# Patient Record
Sex: MALE | Race: WHITE | Employment: OTHER | URBAN - METROPOLITAN AREA
[De-identification: names, ages, dates, MRNs, and addresses within clinical notes are randomized per-mention and may not be internally consistent; named-entity substitution may affect disease eponyms.]

---

## 2017-04-27 RX ORDER — DICYCLOMINE HYDROCHLORIDE 10 MG/1
CAPSULE ORAL
Qty: 120 CAPSULE | Refills: 0 | Status: SHIPPED | OUTPATIENT
Start: 2017-04-27 | End: 2017-05-26 | Stop reason: SDUPTHER

## 2017-05-26 RX ORDER — DICYCLOMINE HYDROCHLORIDE 10 MG/1
CAPSULE ORAL
Qty: 120 CAPSULE | Refills: 0 | Status: SHIPPED | OUTPATIENT
Start: 2017-05-26 | End: 2017-06-23 | Stop reason: SDUPTHER

## 2017-06-23 RX ORDER — DICYCLOMINE HYDROCHLORIDE 10 MG/1
CAPSULE ORAL
Qty: 120 CAPSULE | Refills: 0 | Status: SHIPPED | OUTPATIENT
Start: 2017-06-23 | End: 2017-07-24 | Stop reason: SDUPTHER

## 2017-06-26 ENCOUNTER — TELEPHONE (OUTPATIENT)
Dept: FAMILY MEDICINE CLINIC | Age: 47
End: 2017-06-26

## 2017-07-26 RX ORDER — DICYCLOMINE HYDROCHLORIDE 10 MG/1
CAPSULE ORAL
Qty: 120 CAPSULE | Refills: 0 | Status: SHIPPED | OUTPATIENT
Start: 2017-07-26 | End: 2017-08-28 | Stop reason: SDUPTHER

## 2017-08-11 RX ORDER — VERAPAMIL HYDROCHLORIDE 100 MG/1
CAPSULE, EXTENDED RELEASE ORAL
Qty: 90 CAPSULE | Refills: 0 | Status: SHIPPED | OUTPATIENT
Start: 2017-08-11 | End: 2017-10-20 | Stop reason: SDUPTHER

## 2017-08-15 RX ORDER — ALBUTEROL SULFATE 90 MCG
HFA AEROSOL WITH ADAPTER (GRAM) INHALATION
Qty: 6.7 INHALER | Refills: 3 | Status: SHIPPED | OUTPATIENT
Start: 2017-08-15 | End: 2017-10-20 | Stop reason: SDUPTHER

## 2017-08-24 RX ORDER — ATORVASTATIN CALCIUM 20 MG/1
TABLET, FILM COATED ORAL
Qty: 90 TABLET | Refills: 0 | Status: SHIPPED | OUTPATIENT
Start: 2017-08-24 | End: 2017-10-20 | Stop reason: SDUPTHER

## 2017-08-29 RX ORDER — DICYCLOMINE HYDROCHLORIDE 10 MG/1
CAPSULE ORAL
Qty: 120 CAPSULE | Refills: 0 | Status: SHIPPED | OUTPATIENT
Start: 2017-08-29 | End: 2018-08-11

## 2017-09-05 RX ORDER — DICYCLOMINE HYDROCHLORIDE 10 MG/1
CAPSULE ORAL
Qty: 120 CAPSULE | Refills: 0 | Status: SHIPPED | OUTPATIENT
Start: 2017-09-05 | End: 2017-10-20 | Stop reason: SDUPTHER

## 2017-10-20 ENCOUNTER — OFFICE VISIT (OUTPATIENT)
Dept: FAMILY MEDICINE CLINIC | Age: 47
End: 2017-10-20

## 2017-10-20 VITALS
HEART RATE: 99 BPM | WEIGHT: 315 LBS | DIASTOLIC BLOOD PRESSURE: 82 MMHG | SYSTOLIC BLOOD PRESSURE: 138 MMHG | BODY MASS INDEX: 47.39 KG/M2 | OXYGEN SATURATION: 92 %

## 2017-10-20 DIAGNOSIS — R73.01 IFG (IMPAIRED FASTING GLUCOSE): Primary | ICD-10-CM

## 2017-10-20 DIAGNOSIS — F33.41 RECURRENT MAJOR DEPRESSIVE DISORDER, IN PARTIAL REMISSION (HCC): ICD-10-CM

## 2017-10-20 DIAGNOSIS — F31.11 BIPOLAR 1 DISORDER, MANIC, MILD (HCC): ICD-10-CM

## 2017-10-20 DIAGNOSIS — I10 ESSENTIAL HYPERTENSION: ICD-10-CM

## 2017-10-20 LAB — HBA1C MFR BLD: 6 %

## 2017-10-20 PROCEDURE — 83036 HEMOGLOBIN GLYCOSYLATED A1C: CPT | Performed by: INTERNAL MEDICINE

## 2017-10-20 PROCEDURE — 99214 OFFICE O/P EST MOD 30 MIN: CPT | Performed by: INTERNAL MEDICINE

## 2017-10-20 RX ORDER — ATORVASTATIN CALCIUM 20 MG/1
TABLET, FILM COATED ORAL
Qty: 90 TABLET | Refills: 1 | Status: SHIPPED | OUTPATIENT
Start: 2017-10-20 | End: 2017-11-21 | Stop reason: SDUPTHER

## 2017-10-20 RX ORDER — FLUTICASONE PROPIONATE 50 MCG
2 SPRAY, SUSPENSION (ML) NASAL DAILY
Qty: 1 BOTTLE | Refills: 3 | Status: SHIPPED | OUTPATIENT
Start: 2017-10-20 | End: 2018-01-17

## 2017-10-20 RX ORDER — VERAPAMIL HYDROCHLORIDE 100 MG/1
CAPSULE, EXTENDED RELEASE ORAL
Qty: 90 CAPSULE | Refills: 1 | Status: SHIPPED | OUTPATIENT
Start: 2017-10-20 | End: 2018-08-11

## 2017-10-20 NOTE — PROGRESS NOTES
reviewed. Assessment:      Bernie Hamilton was seen today for hypertension. Diagnoses and all orders for this visit:    Bipolar 1 disorder ([facility] [address]  -     External Referral To Psychology  Failed multiple first-line medications due to concern over taking the medication, noncompliance and potential side effects. Refer encourage the patient to follow through. IFG (impaired fasting glucose)  -     POCT glycosylated hemoglobin (Hb A1C)  Last 2 glucose 160 and 180, A1c today was 6 discussed impaired fasting glucose and reduction of carbohydrate intake. Essential hypertension  Controlled continue current    Other orders  -     verapamil (VERELAN PM) 100 MG CP24 extended release capsule; TAKE 1 CAPSULE BY MOUTH DAILY. -     atorvastatin (LIPITOR) 20 MG tablet; TAKE 1 TABLET BY MOUTH DAILY  Sinus congestion likely seasonal allergies also causing headache, trial of Flonase  -     fluticasone (FLONASE) 50 MCG/ACT nasal spray; 2 sprays by Nasal route daily              Plan:      As above and per orders.

## 2017-11-10 NOTE — TELEPHONE ENCOUNTER
Last office visit 10/20/2017     Last written 8/29/17     Next office visit scheduled 1/22/2018    Requested Prescriptions     Pending Prescriptions Disp Refills    dicyclomine (BENTYL) 10 MG capsule [Pharmacy Med Name: DICYCLOMINE 10 MG CAPSULE] 120 capsule 0     Sig: TAKE 1 CAPSULE BY MOUTH 4 TIMES DAILY (BEFORE MEALS AND NIGHTLY) FOR 30 DAYS.

## 2017-11-13 RX ORDER — DICYCLOMINE HYDROCHLORIDE 10 MG/1
CAPSULE ORAL
Qty: 120 CAPSULE | Refills: 0 | Status: SHIPPED | OUTPATIENT
Start: 2017-11-13 | End: 2017-12-14 | Stop reason: SDUPTHER

## 2017-11-21 RX ORDER — ATORVASTATIN CALCIUM 20 MG/1
TABLET, FILM COATED ORAL
Qty: 90 TABLET | Refills: 1 | Status: SHIPPED | OUTPATIENT
Start: 2017-11-21 | End: 2018-08-11

## 2017-12-14 RX ORDER — DICYCLOMINE HYDROCHLORIDE 10 MG/1
CAPSULE ORAL
Qty: 120 CAPSULE | Refills: 0 | Status: SHIPPED | OUTPATIENT
Start: 2017-12-14 | End: 2018-01-15 | Stop reason: SDUPTHER

## 2018-01-17 ENCOUNTER — TELEPHONE (OUTPATIENT)
Dept: FAMILY MEDICINE CLINIC | Age: 48
End: 2018-01-17

## 2018-01-17 DIAGNOSIS — R73.01 IFG (IMPAIRED FASTING GLUCOSE): Primary | ICD-10-CM

## 2018-01-17 DIAGNOSIS — E78.00 HYPERCHOLESTEROLEMIA: ICD-10-CM

## 2018-01-17 RX ORDER — DICYCLOMINE HYDROCHLORIDE 10 MG/1
CAPSULE ORAL
Qty: 120 CAPSULE | Refills: 0 | Status: SHIPPED | OUTPATIENT
Start: 2018-01-17 | End: 2018-03-04 | Stop reason: SDUPTHER

## 2018-01-17 NOTE — TELEPHONE ENCOUNTER
Dr. Jesica Mata: This patient has an upcoming appointment with you for Impaired Fasting Glucose & Hyperlipidemia     In planning for that visit I have completed the following pre-visit planning:     Pre-Visit Planning Checklist:  Patient contacted: yes  Verified patient by name and date of birth: yes    Health Maintenance items reviewed:    No pre-visit planning health maintenance topics to review at this time    Labs and procedures pended: Fasting Lipid Panel  & Renal  Labs and procedures discussed with patient: yes  Reminded patient to check with their insurance company about coverage for lab tests and lab location: no    Preliminary Medication Reconciliation: was performed. Reminded patient to bring medications to appointment: no    Reminded patient to arrive early: yes    Other notes:     Please complete the med-reconciliation and sign the appropriate labs as soon as possible.       Jorge Ely MA  Pre-Services Specialist

## 2018-03-06 RX ORDER — DICYCLOMINE HYDROCHLORIDE 10 MG/1
CAPSULE ORAL
Qty: 120 CAPSULE | Refills: 0 | Status: SHIPPED | OUTPATIENT
Start: 2018-03-06 | End: 2018-04-21 | Stop reason: SDUPTHER

## 2018-03-13 ENCOUNTER — TELEPHONE (OUTPATIENT)
Dept: FAMILY MEDICINE CLINIC | Age: 48
End: 2018-03-13

## 2018-04-03 RX ORDER — DICYCLOMINE HYDROCHLORIDE 10 MG/1
CAPSULE ORAL
Qty: 120 CAPSULE | Refills: 0 | OUTPATIENT
Start: 2018-04-03

## 2018-04-25 RX ORDER — DICYCLOMINE HYDROCHLORIDE 10 MG/1
CAPSULE ORAL
Qty: 120 CAPSULE | Refills: 0 | Status: SHIPPED | OUTPATIENT
Start: 2018-04-25 | End: 2018-08-11

## 2018-08-11 ENCOUNTER — HOSPITAL ENCOUNTER (EMERGENCY)
Age: 48
Discharge: HOME | End: 2018-08-11
Attending: EMERGENCY MEDICINE

## 2018-08-11 VITALS
RESPIRATION RATE: 14 BRPM | TEMPERATURE: 98.3 F | SYSTOLIC BLOOD PRESSURE: 155 MMHG | OXYGEN SATURATION: 94 % | DIASTOLIC BLOOD PRESSURE: 96 MMHG | WEIGHT: 300 LBS | BODY MASS INDEX: 42.95 KG/M2 | HEART RATE: 72 BPM | HEIGHT: 70 IN

## 2018-08-11 DIAGNOSIS — S39.012A STRAIN OF LUMBAR REGION, INITIAL ENCOUNTER: Primary | ICD-10-CM

## 2018-08-11 PROCEDURE — 99283 EMERGENCY DEPT VISIT LOW MDM: CPT

## 2018-08-11 PROCEDURE — 6360000002 HC RX W HCPCS: Performed by: EMERGENCY MEDICINE

## 2018-08-11 PROCEDURE — 96374 THER/PROPH/DIAG INJ IV PUSH: CPT

## 2018-08-11 RX ORDER — TIZANIDINE 4 MG/1
4 TABLET ORAL EVERY 6 HOURS PRN
Qty: 12 TABLET | Refills: 0 | Status: SHIPPED | OUTPATIENT
Start: 2018-08-11 | End: 2018-11-26

## 2018-08-11 RX ORDER — OXYCODONE HYDROCHLORIDE AND ACETAMINOPHEN 5; 325 MG/1; MG/1
1 TABLET ORAL EVERY 6 HOURS PRN
Qty: 10 TABLET | Refills: 0 | Status: SHIPPED | OUTPATIENT
Start: 2018-08-11 | End: 2018-08-13

## 2018-08-11 RX ORDER — KETOROLAC TROMETHAMINE 30 MG/ML
30 INJECTION, SOLUTION INTRAMUSCULAR; INTRAVENOUS ONCE
Status: COMPLETED | OUTPATIENT
Start: 2018-08-11 | End: 2018-08-11

## 2018-08-11 RX ADMIN — KETOROLAC TROMETHAMINE 30 MG: 30 INJECTION, SOLUTION INTRAMUSCULAR at 01:38

## 2018-08-11 ASSESSMENT — PAIN SCALES - GENERAL
PAINLEVEL_OUTOF10: 4
PAINLEVEL_OUTOF10: 5
PAINLEVEL_OUTOF10: 6

## 2018-08-11 ASSESSMENT — PAIN DESCRIPTION - LOCATION: LOCATION: BACK

## 2018-08-11 ASSESSMENT — PAIN DESCRIPTION - PAIN TYPE: TYPE: ACUTE PAIN;CHRONIC PAIN

## 2018-08-11 NOTE — ED TRIAGE NOTES
Chief Complaint   Patient presents with    Back Pain     Pt states that he has had chronic back pain but has not had any issues for the last 2 years. Pt reports back pain for the last 4 days. Denies injury.

## 2018-08-11 NOTE — ED PROVIDER NOTES
Triage Chief Complaint:   Back Pain (Pt states that he has had chronic back pain but has not had any issues for the last 2 years. Pt reports back pain for the last 4 days. Denies injury. )    [place]:  Shayne Carty is a 53 y.o. male that presents with low back pain. This episode low back pain as been going on for 4 days. He bent over and picked up something heavy. There's been no fall or impact. Pain came on gradually. It is gradually gotten worse. He can lay in bed and get comfortable without much pain at all. Pain is increased when he tries to move or sit up. At rest the pain as a 6 on a scale of 1-10. On movement pain becomes a 10. In the low back. No radiation of the legs. No weakness in the lower extremities. No bowel or bladder symptoms. No perirectal numbness. He's had low back discomfort in the past.  He is not taking any medication other than ibuprofen. ROS:  Total 10 systems are reviewed and are negative except for the HPI. Past Medical History:   Diagnosis Date    Anxiety     Bipolar 1 disorder, manic, mild ([facility] [address] 11/8/2016    Gastroesophageal reflux disease without esophagitis 6/3/2016    HTN (hypertension) 8/7/2015    Hyperlipidemia     IBS (irritable bowel syndrome) 3/21/2013    MDD (major depressive disorder) 10/28/2013    Migraine syndrome 3/21/2013    Pneumonia     Tobacco abuse      Past Surgical History:   Procedure Laterality Date    CHOLECYSTECTOMY       Family History   Problem Relation Age of Onset    Heart Disease Father     Dementia Mother     Asthma Neg Hx     Cancer Neg Hx     Diabetes Neg Hx     Heart Failure Neg Hx     Hypertension Neg Hx     Emphysema Neg Hx      Social History     Social History    Marital status:      Spouse name: N/A    Number of children: N/A    Years of education: N/A     Occupational History    Not on file.      Social History Main Topics    Smoking status: Former Smoker     Packs/day: 1.00     Years: 20.00 Types: Cigarettes     Quit date: 11/8/2013    Smokeless tobacco: Never Used      Comment: Pt states he hasn't smoked in 6 days    Alcohol use No      Comment: used to be a heavy drinker- has been a year    Drug use: No    Sexual activity: Not Currently     Partners: Female     Other Topics Concern    Not on file     Social History Narrative    No narrative on file     Current Facility-Administered Medications   Medication Dose Route Frequency Provider Last Rate Last Dose    ketorolac (TORADOL) injection 30 mg  30 mg Intramuscular Once Neida Osorio MD         Current Outpatient Prescriptions   Medication Sig Dispense Refill    oxyCODONE-acetaminophen (PERCOCET) 5-325 MG per tablet Take 1 tablet by mouth every 6 hours as needed for Pain for up to 2 days. . 10 tablet 0    tiZANidine (ZANAFLEX) 4 MG tablet Take 1 tablet by mouth every 6 hours as needed (Low back pain) 12 tablet 0    aspirin 81 MG chewable tablet Take 81 mg by mouth daily. No Known Allergies    Nursing Notes Reviewed    Physical Exam:  ED Triage Vitals [08/11/18 0031]   Enc Vitals Group      BP (!) 184/97      Pulse 82      Resp 16      Temp 98.3 °F (36.8 °C)      Temp Source Oral      SpO2 94 %      Weight 300 lb (136.1 kg)      Height 5' 9.5\" (1.765 m)      Head Circumference       Peak Flow       Pain Score       Pain Loc       Pain Edu? Excl. in [address]? GENERAL APPEARANCE: Awake and alert. Cooperative. No acute distress. Able to sit up for examination but has considerable discomfort on moving or changing position. HEAD: Normocephalic. Atraumatic. EYES: EOM's grossly intact. Sclera anicteric. PEERL  ENT: Mucous membranes are moist. Tolerates saliva. No trismus. NECK: Supple. No meningismus. Trachea midline. No midline pain on palpation of the spine from the cervical to lumbar region. HEART: RRR. Radial pulses 2+. Heart without murmur. LUNGS: Respirations unlabored. CTAB  ABDOMEN: Soft. Non-tender.  No guarding or rebound. No CVAT. Diffuse lumbar discomfort on palpation. EXTREMITIES: No acute deformities. Hand  are equal.  No joint swelling. No lower extremity calf pain or edema. SKIN: Warm and dry. NEUROLOGICAL:  DTRs are equal.  Moves all 4 extremities spontaneously. No focal motor or sensory abnormalities of the upper or lower extremities. PSYCHIATRIC: Normal mood. I have reviewed and interpreted all of the currently available lab results from this visit (if applicable):  No results found for this visit on 08/11/18. Radiographs (if obtained):  [] The following radiograph was interpreted by myself in the absence of a radiologist:  [] Radiologist's Report Reviewed:      EKG (if obtained): (All EKG's are interpreted by myself in the absence of a cardiologist)    MDM:  He is placed in room and examined. He is afebrile. Vitals are normal.  He is relatively comfortable at rest.  When he moves pain is exacerbated. Pain is in the low back but no radiation into the legs. Consider the possibility of cauda equina syndrome and or spinal epidural abscess and I feel that these conditions would be extremely unlikely. HSNE spine negative. I'll given Toradol IM here. I will discharge him with a very small prescription of Percocet. I will give him Zanaflex as well. He will take ibuprofen at home. I recommend ice to the low back. I recommend he be a little bit active. Follow-up will be with his PCP. He is in stable condition on release. Clinical Impression:  1.  Strain of lumbar region, initial encounter      (Please note that portions of this note may have been completed with a voice recognition program. Efforts were made to edit the dictations but occasionally words are mis-transcribed.)    Otto [place], MD Sabina Severs, MD  08/11/18 0861

## 2018-11-26 ENCOUNTER — HOSPITAL ENCOUNTER (EMERGENCY)
Age: 48
Discharge: HOME | End: 2018-11-26
Payer: MEDICARE

## 2018-11-26 VITALS
HEIGHT: 70 IN | OXYGEN SATURATION: 96 % | HEART RATE: 99 BPM | RESPIRATION RATE: 16 BRPM | WEIGHT: 280 LBS | DIASTOLIC BLOOD PRESSURE: 96 MMHG | SYSTOLIC BLOOD PRESSURE: 150 MMHG | BODY MASS INDEX: 40.09 KG/M2 | TEMPERATURE: 98 F

## 2018-11-26 DIAGNOSIS — M54.50 LUMBAR PAIN: Primary | ICD-10-CM

## 2018-11-26 PROCEDURE — 99283 EMERGENCY DEPT VISIT LOW MDM: CPT

## 2018-11-26 RX ORDER — NAPROXEN 500 MG/1
500 TABLET ORAL 2 TIMES DAILY WITH MEALS
Qty: 14 TABLET | Refills: 0 | Status: SHIPPED | OUTPATIENT
Start: 2018-11-26 | End: 2019-04-07

## 2018-11-26 RX ORDER — CYCLOBENZAPRINE HCL 10 MG
10 TABLET ORAL 3 TIMES DAILY PRN
Qty: 15 TABLET | Refills: 0 | Status: SHIPPED | OUTPATIENT
Start: 2018-11-26 | End: 2019-02-10 | Stop reason: ALTCHOICE

## 2018-11-26 RX ORDER — LIDOCAINE 50 MG/G
1 PATCH TOPICAL DAILY
Qty: 5 PATCH | Refills: 0 | Status: SHIPPED | OUTPATIENT
Start: 2018-11-26 | End: 2018-12-01

## 2018-11-26 ASSESSMENT — ENCOUNTER SYMPTOMS
BOWEL INCONTINENCE: 0
VOMITING: 0
ABDOMINAL PAIN: 0
SHORTNESS OF BREATH: 0
BACK PAIN: 1

## 2018-11-26 ASSESSMENT — PAIN DESCRIPTION - FREQUENCY: FREQUENCY: CONTINUOUS

## 2018-11-26 ASSESSMENT — PAIN DESCRIPTION - PAIN TYPE: TYPE: CHRONIC PAIN

## 2018-11-26 ASSESSMENT — PAIN DESCRIPTION - LOCATION: LOCATION: BACK

## 2018-11-26 ASSESSMENT — PAIN DESCRIPTION - ORIENTATION: ORIENTATION: LOWER

## 2018-11-26 ASSESSMENT — PAIN SCALES - GENERAL: PAINLEVEL_OUTOF10: 6

## 2018-11-26 ASSESSMENT — PAIN DESCRIPTION - DESCRIPTORS: DESCRIPTORS: ACHING

## 2018-11-27 NOTE — ED PROVIDER NOTES
Negative for weakness and numbness. PAST MEDICAL HISTORY   has a past medical history of Anxiety; Bipolar 1 disorder, manic, mild ([facility] [address] (11/8/2016); Gastroesophageal reflux disease without esophagitis (6/3/2016); HTN (hypertension) (8/7/2015); Hyperlipidemia; IBS (irritable bowel syndrome) (3/21/2013); MDD (major depressive disorder) (10/28/2013); Migraine syndrome (3/21/2013); Pneumonia; and Tobacco abuse. PAST SURGICAL HISTORY   has a past surgical history that includes Cholecystectomy. FAMILY HISTORY  family history includes Dementia in his mother; Heart Disease in his father. SOCIAL HISTORY   reports that he quit smoking about 5 years ago. His smoking use included Cigarettes. He has a 20.00 pack-year smoking history. He has never used smokeless tobacco. He reports that he does not drink alcohol or use drugs. HOME MEDICATIONS     Prior to Admission medications    Medication Sig Start Date End Date Taking? Authorizing Provider   cyclobenzaprine (FLEXERIL) 10 MG tablet Take 1 tablet by mouth 3 times daily as needed for Muscle spasms 11/26/18  Yes Adrien Mackay PA-C   naproxen (NAPROSYN) 500 MG tablet Take 1 tablet by mouth 2 times daily (with meals) for 7 days 11/26/18 12/3/18 Yes Adrien Mackay PA-C   lidocaine (LIDODERM) 5 % Place 1 patch onto the skin daily for 5 days 12 hours on, 12 hours off. 11/26/18 12/1/18 Yes Adrien Mackay PA-C        ALLERGIES  has No Known Allergies. BP (!) 150/96   Pulse 99   Temp 98 °F (36.7 °C) (Oral)   Resp 16   Ht 5' 9.5\" (1.765 m)   Wt 280 lb (127 kg)   SpO2 96%   BMI 40.76 kg/m²     Physical Exam   Constitutional: He is oriented to person, place, and time. He appears well-developed and well-nourished. Non-toxic appearance. He does not have a sickly appearance. He does not appear ill. HENT:   Head: Normocephalic and atraumatic.    Mouth/Throat: Oropharynx is clear and moist.   Cardiovascular: Normal rate, regular rhythm, normal heart sounds and estimate there is LOW risk for ABDOMINAL AORTIC ANEURYSM, CAUDA EQUINA SYNDROME, EPIDURAL MASS LESION, OR CORD COMPRESSION, thus I consider the discharge disposition reasonable. Please note that this chart was generated using Dragon dictation software.  Although every effort was made to ensure the accuracy of this automated transcription, some errors in transcription may have occurred         Sav Ashby PA-C  11/26/18 6079

## 2018-12-07 ENCOUNTER — HOSPITAL ENCOUNTER (EMERGENCY)
Age: 48
Discharge: HOME | End: 2018-12-07
Payer: MEDICARE

## 2018-12-07 VITALS
HEIGHT: 70 IN | HEART RATE: 87 BPM | BODY MASS INDEX: 40.09 KG/M2 | OXYGEN SATURATION: 96 % | RESPIRATION RATE: 16 BRPM | SYSTOLIC BLOOD PRESSURE: 142 MMHG | TEMPERATURE: 98 F | DIASTOLIC BLOOD PRESSURE: 93 MMHG | WEIGHT: 280 LBS

## 2018-12-07 DIAGNOSIS — J01.10 ACUTE NON-RECURRENT FRONTAL SINUSITIS: Primary | ICD-10-CM

## 2018-12-07 DIAGNOSIS — M54.16 LUMBAR RADICULOPATHY: ICD-10-CM

## 2018-12-07 DIAGNOSIS — R05.9 COUGH: ICD-10-CM

## 2018-12-07 PROCEDURE — 99283 EMERGENCY DEPT VISIT LOW MDM: CPT

## 2018-12-07 PROCEDURE — 97161 PT EVAL LOW COMPLEX 20 MIN: CPT

## 2018-12-07 PROCEDURE — G8979 MOBILITY GOAL STATUS: HCPCS

## 2018-12-07 PROCEDURE — G8978 MOBILITY CURRENT STATUS: HCPCS

## 2018-12-07 PROCEDURE — G8980 MOBILITY D/C STATUS: HCPCS

## 2018-12-07 PROCEDURE — 97140 MANUAL THERAPY 1/> REGIONS: CPT

## 2018-12-07 PROCEDURE — 6370000000 HC RX 637 (ALT 250 FOR IP): Performed by: NURSE PRACTITIONER

## 2018-12-07 RX ORDER — DOXYCYCLINE HYCLATE 100 MG
100 TABLET ORAL ONCE
Status: COMPLETED | OUTPATIENT
Start: 2018-12-07 | End: 2018-12-07

## 2018-12-07 RX ORDER — METHOCARBAMOL 500 MG/1
500 TABLET, FILM COATED ORAL 3 TIMES DAILY
Qty: 15 TABLET | Refills: 0 | Status: SHIPPED | OUTPATIENT
Start: 2018-12-07 | End: 2018-12-12

## 2018-12-07 RX ORDER — BENZONATATE 100 MG/1
100 CAPSULE ORAL 3 TIMES DAILY PRN
Qty: 30 CAPSULE | Refills: 0 | Status: SHIPPED | OUTPATIENT
Start: 2018-12-07 | End: 2018-12-14

## 2018-12-07 RX ORDER — DOXYCYCLINE 100 MG/1
100 TABLET ORAL 2 TIMES DAILY
Qty: 20 TABLET | Refills: 0 | Status: SHIPPED | OUTPATIENT
Start: 2018-12-07 | End: 2018-12-17

## 2018-12-07 RX ADMIN — DOXYCYCLINE HYCLATE 100 MG: 100 TABLET, COATED ORAL at 14:22

## 2018-12-07 ASSESSMENT — PAIN SCALES - GENERAL: PAINLEVEL_OUTOF10: 6

## 2018-12-07 ASSESSMENT — ENCOUNTER SYMPTOMS
COUGH: 1
RHINORRHEA: 1
SINUS PRESSURE: 1
ABDOMINAL PAIN: 0
SHORTNESS OF BREATH: 0

## 2018-12-07 NOTE — PROGRESS NOTES
normal range     Sidebending Right 25% normal range     Rotation Left    []   Seated  []   Standing       Rotation Right    []   Seated  []   Standing         Sensation/Motor Function   [x] All dermatomes WNL except as marked below   [x] All  myotomes WNL except as marked below      Dermatome Left Right Myotome Left Right   Anterior groin, 2-3 inches below ASIS (L1-L2)   Hip Flexion (L1-L2)     Middle third anterior thigh (L3)   Hip adduction (L3)     Patella and med malleolus (L4)   Knee extension (L3,4)     Fibular head and dorsum of foot (L5)   Ankle dorsiflexion (L4)     Lateral side and plantar surface of foot (S1)   Hip abduction (L5)     Medial aspect of posterior thigh (S2)   Great toe extension (L5)     Perianal area (S3,4)   Knee flexion (L5,S1)        Ankle plantarflexion (S1,2)       Reflexes/Trunk Strength    [x] All WNL except as marked below     Reflex Left Right Strength Strength   Quadriceps (L3,4)   Trunk Extensors    Achilles (S1,2)   Gluteals    Ankle clonus   Abdominals    Paul          Flexibility       Hip flexors/Zurdo:  []   WFL []   Tight      Hamstrings:    []   WFL []   Tight       Gastrocs:    []   WFL []   Tight   Obers:    []   WFL []   Tight       TREATMENT  Educated on anatomy, physiology, and biomechanics of lumbar spine and pelvis. Pt verbalized understanding and all of patient's questions answered to satisfaction. HEP issued. Manual tx:   Long axis traction RLE and LLE in 30/30/30 loose pack  Long axis traction RLE and LLE in close pack  Gentle manual traction of lumbar spine    Therex:  Knee rocks x10  Ayla Lawman  DKTC  Glute sets    Equipment: N/A       ASSESSMENT     Pt presenting to ED with c/o back pain and cough. No xray indicated. Through evaluation and examination, identified findings consistent with lumbar strain. PT recommending pt perform HEP and utilize heat to dec muscle spasms. No additional PT recommended at this time.  Discussed findings and recommendations

## 2019-02-10 ENCOUNTER — HOSPITAL ENCOUNTER (EMERGENCY)
Age: 49
Discharge: HOME | End: 2019-02-11
Attending: EMERGENCY MEDICINE
Payer: MEDICARE

## 2019-02-10 DIAGNOSIS — S02.5XXA CLOSED FRACTURE OF TOOTH, INITIAL ENCOUNTER: Primary | ICD-10-CM

## 2019-02-10 PROCEDURE — 99282 EMERGENCY DEPT VISIT SF MDM: CPT

## 2019-02-10 ASSESSMENT — PAIN DESCRIPTION - DESCRIPTORS: DESCRIPTORS: ACHING

## 2019-02-10 ASSESSMENT — PAIN DESCRIPTION - PROGRESSION: CLINICAL_PROGRESSION: NOT CHANGED

## 2019-02-10 ASSESSMENT — PAIN DESCRIPTION - LOCATION: LOCATION: MOUTH

## 2019-02-10 ASSESSMENT — PAIN DESCRIPTION - PAIN TYPE: TYPE: ACUTE PAIN

## 2019-02-10 ASSESSMENT — PAIN SCALES - GENERAL: PAINLEVEL_OUTOF10: 6

## 2019-02-11 VITALS
BODY MASS INDEX: 40.73 KG/M2 | TEMPERATURE: 98.7 F | DIASTOLIC BLOOD PRESSURE: 96 MMHG | WEIGHT: 275 LBS | RESPIRATION RATE: 16 BRPM | HEIGHT: 69 IN | HEART RATE: 117 BPM | SYSTOLIC BLOOD PRESSURE: 171 MMHG | OXYGEN SATURATION: 97 %

## 2019-02-22 ENCOUNTER — HOSPITAL ENCOUNTER (OUTPATIENT)
Dept: CT IMAGING | Age: 49
Discharge: HOME | End: 2019-02-22
Payer: MEDICARE

## 2019-02-22 DIAGNOSIS — R31.9 HEMATURIA, UNSPECIFIED TYPE: ICD-10-CM

## 2019-02-22 PROCEDURE — 74176 CT ABD & PELVIS W/O CONTRAST: CPT

## 2019-04-07 ENCOUNTER — APPOINTMENT (OUTPATIENT)
Dept: GENERAL RADIOLOGY | Age: 49
End: 2019-04-07
Payer: MEDICARE

## 2019-04-07 ENCOUNTER — HOSPITAL ENCOUNTER (EMERGENCY)
Age: 49
Discharge: HOME | End: 2019-04-07
Payer: MEDICARE

## 2019-04-07 VITALS
TEMPERATURE: 97.9 F | HEIGHT: 70 IN | HEART RATE: 90 BPM | DIASTOLIC BLOOD PRESSURE: 85 MMHG | SYSTOLIC BLOOD PRESSURE: 130 MMHG | BODY MASS INDEX: 45.1 KG/M2 | OXYGEN SATURATION: 95 % | RESPIRATION RATE: 18 BRPM | WEIGHT: 315 LBS

## 2019-04-07 DIAGNOSIS — R05.9 COUGH: Primary | ICD-10-CM

## 2019-04-07 DIAGNOSIS — J06.9 UPPER RESPIRATORY TRACT INFECTION, UNSPECIFIED TYPE: ICD-10-CM

## 2019-04-07 PROCEDURE — 71046 X-RAY EXAM CHEST 2 VIEWS: CPT

## 2019-04-07 PROCEDURE — 6370000000 HC RX 637 (ALT 250 FOR IP): Performed by: PHYSICIAN ASSISTANT

## 2019-04-07 PROCEDURE — 6360000002 HC RX W HCPCS: Performed by: PHYSICIAN ASSISTANT

## 2019-04-07 PROCEDURE — 99283 EMERGENCY DEPT VISIT LOW MDM: CPT

## 2019-04-07 RX ORDER — ALBUTEROL SULFATE 90 UG/1
AEROSOL, METERED RESPIRATORY (INHALATION)
Qty: 1 INHALER | Refills: 1 | Status: SHIPPED | OUTPATIENT
Start: 2019-04-07 | End: 2019-07-01

## 2019-04-07 RX ORDER — GUAIFENESIN 600 MG/1
600 TABLET, EXTENDED RELEASE ORAL 2 TIMES DAILY
Qty: 30 TABLET | Refills: 0 | Status: SHIPPED | OUTPATIENT
Start: 2019-04-07 | End: 2019-04-22

## 2019-04-07 RX ORDER — PREDNISONE 20 MG/1
60 TABLET ORAL ONCE
Status: COMPLETED | OUTPATIENT
Start: 2019-04-07 | End: 2019-04-07

## 2019-04-07 RX ORDER — BENZONATATE 100 MG/1
100 CAPSULE ORAL 3 TIMES DAILY PRN
Qty: 20 CAPSULE | Refills: 0 | Status: SHIPPED | OUTPATIENT
Start: 2019-04-07 | End: 2019-07-01

## 2019-04-07 RX ORDER — ALBUTEROL SULFATE 2.5 MG/3ML
5 SOLUTION RESPIRATORY (INHALATION) ONCE
Status: COMPLETED | OUTPATIENT
Start: 2019-04-07 | End: 2019-04-07

## 2019-04-07 RX ADMIN — PREDNISONE 60 MG: 20 TABLET ORAL at 13:12

## 2019-04-07 RX ADMIN — ALBUTEROL SULFATE 5 MG: 2.5 SOLUTION RESPIRATORY (INHALATION) at 12:58

## 2019-04-07 ASSESSMENT — ENCOUNTER SYMPTOMS
COUGH: 1
GASTROINTESTINAL NEGATIVE: 1

## 2019-04-07 ASSESSMENT — PAIN SCALES - GENERAL: PAINLEVEL_OUTOF10: 5

## 2019-04-07 ASSESSMENT — PAIN DESCRIPTION - LOCATION: LOCATION: HEAD

## 2019-04-07 NOTE — ED PROVIDER NOTES
[facility]  e[facility]GENCY dEPARTMENT eNCOUnter      Pt Name: Moustapha Murray  WET:[id number]  [place] 1970  Date of evaluation: 4/7/2019  Provider: Abiodun Bains PA-C      Chief Complaint:    Chief Complaint   Patient presents with    Cough     Pt has had cough for 7 days, states that sinuses are bothering him and his head is killing him. Nursing Notes, Past Medical Hx, Past Surgical Hx, Social Hx, Allergies, and Family Hx were all reviewed and agreed with or any disagreements were addressed in the HPI.    HPI:  (Location, Duration, Timing, Severity,Quality, Assoc Sx, Context, Modifying factors)  This is a  51 y.o. male brought in for complaints of a productive cough with sputum. Onset of symptoms have been over the course of one week. Duration symptoms have been intermittent over the course of one week. Patient also admits to some sinus pressure. He denies any fevers, chills, pharyngitis, nausea, vomiting or abdominal pain. Patient states he hasn't tried anything at home for symptomatic relief. No aggravating symptoms. No alleviating symptoms. PastMedical/Surgical History:      Diagnosis Date    Anxiety     Bipolar 1 disorder, manic, mild ([facility]) 11/8/2016    Gastroesophageal reflux disease without esophagitis 6/3/2016    HTN (hypertension) 8/7/2015    Hyperlipidemia     IBS (irritable bowel syndrome) 3/21/2013    MDD (major depressive disorder) 10/28/2013    Migraine syndrome 3/21/2013    Pneumonia     Tobacco abuse          Procedure Laterality Date    CHOLECYSTECTOMY         Medications:  Discharge Medication List as of 4/7/2019 12:54 PM            Review of Systems:  Review of Systems   Constitutional: Negative. HENT: Negative. Respiratory: Positive for cough. Cardiovascular: Negative. Gastrointestinal: Negative. Genitourinary: Negative. Musculoskeletal: Negative. Skin: Negative. Neurological: Negative. Positives and Pertinent negatives as per HPI. Except as noted above in the ROS, problem specific ROS was completed and is negative. Physical Exam:  Physical Exam   Constitutional: He is oriented to person, place, and time. He appears well-developed and well-nourished. HENT:   Head: Normocephalic and atraumatic. Right Ear: Tympanic membrane and external ear normal. Tympanic membrane is not injected, not erythematous, not retracted and not bulging. Left Ear: Tympanic membrane and external ear normal. Tympanic membrane is not injected, not erythematous, not retracted and not bulging. Nose: Nose normal. No mucosal edema or rhinorrhea. Mouth/Throat: Uvula is midline, oropharynx is clear and moist and mucous membranes are normal. No uvula swelling. Tonsils are 0 on the right. Tonsils are 0 on the left. No tonsillar exudate. Eyes: Right eye exhibits no discharge. Left eye exhibits no discharge. Neck: Trachea normal, normal range of motion and full passive range of motion without pain. Neck supple. No Kernig's sign noted. Cardiovascular: Normal rate, regular rhythm and normal heart sounds. Exam reveals no gallop. No murmur heard. Pulses:       Radial pulses are 2+ on the right side, and 2+ on the left side. Pulmonary/Chest: Effort normal and breath sounds normal. No respiratory distress. He has no wheezes. He has no rales. He exhibits no tenderness. Abdominal: Soft. Normal appearance and bowel sounds are normal. There is no tenderness. There is no rigidity, no rebound, no guarding and no CVA tenderness. Musculoskeletal: Normal range of motion. He exhibits no deformity. Neurological: He is alert and oriented to person, place, and time. Skin: Skin is warm, dry and intact. No rash noted. He is not diaphoretic. Psychiatric: He has a normal mood and affect. His behavior is normal.   Nursing note and vitals reviewed.       MEDICAL DECISION MAKING    Vitals: Vitals:    04/07/19 1154 04/07/19 1313 04/07/19 1317   BP: (!) 141/84  130/85   Pulse: 91 93 90   Resp: 18  18   Temp: 97.9 °F (36.6 °C)     TempSrc: Oral     SpO2: 94% 98% 95%   Weight: (!) 315 lb (142.9 kg)     Height: 5' 9.5\" (1.765 m)         LABS:Labs Reviewed - No data to display     Remainder of labs reviewed and werenegative at this time or not returned at the time of this note. RADIOLOGY:   Non-plain film images such as CT, Ultrasound and MRI are read by the radiologist. Kalyani Brewer PA-C have directly visualized the radiologic plain film image(s) with the below findings:        Interpretation per the Radiologist below, if available at the time of thisnote:    XR CHEST STANDARD (2 VW)   Final Result   No acute findings              No results found. MEDICAL DECISION MAKING / ED COURSE:      PROCEDURES:   Procedures    None    Patient was given:     Medications   albuterol (PROVENTIL) nebulizer solution 5 mg (5 mg Nebulization Given 4/7/19 1258)   predniSONE (DELTASONE) tablet 60 mg (60 mg Oral Given 4/7/19 1312)       Patient brought in today for complaints of a productive cough going on for 1 week. On exam patient alert oriented afebrile hemodynamically stable breathing and room air satting at 94%. Patient is nontoxic no respiratory distress. On exam lungs are clear to auscultation. Patient's neck is supple without any lymphadenopathy. Patient has regular rate and rhythm. Chest x-ray is unremarkable without evidence of bronchitis or pneumonia. Patient states he does not smoke. Patient given a breathing treatment and steroids here. Plan will be to discharge with an albuterol inhaler to use as needed. Patient will also be given Mucinex and Tessalon Perles for symptomatic relief. Patient advised to follow-up with primary care in 1 week if symptoms have not improved. Patient was comfortable and agreeable to this plan. The patient tolerated their visit well.   I evaluated the

## 2019-04-14 ENCOUNTER — HOSPITAL ENCOUNTER (EMERGENCY)
Age: 49
Discharge: HOME | End: 2019-04-14
Payer: MEDICARE

## 2019-04-14 ENCOUNTER — APPOINTMENT (OUTPATIENT)
Dept: GENERAL RADIOLOGY | Age: 49
End: 2019-04-14
Payer: MEDICARE

## 2019-04-14 VITALS
TEMPERATURE: 98.3 F | SYSTOLIC BLOOD PRESSURE: 143 MMHG | RESPIRATION RATE: 18 BRPM | OXYGEN SATURATION: 93 % | HEIGHT: 70 IN | WEIGHT: 315 LBS | HEART RATE: 85 BPM | BODY MASS INDEX: 45.1 KG/M2 | DIASTOLIC BLOOD PRESSURE: 95 MMHG

## 2019-04-14 DIAGNOSIS — R51.9 NONINTRACTABLE HEADACHE, UNSPECIFIED CHRONICITY PATTERN, UNSPECIFIED HEADACHE TYPE: Primary | ICD-10-CM

## 2019-04-14 DIAGNOSIS — I10 ESSENTIAL HYPERTENSION: ICD-10-CM

## 2019-04-14 DIAGNOSIS — R05.9 COUGH: ICD-10-CM

## 2019-04-14 LAB
A/G RATIO: 1.2 (ref 1.1–2.2)
ALBUMIN SERPL-MCNC: 3.7 G/DL (ref 3.4–5)
ALP BLD-CCNC: 58 U/L (ref 40–129)
ALT SERPL-CCNC: 15 U/L (ref 10–40)
ANION GAP SERPL CALCULATED.3IONS-SCNC: 9 MMOL/L (ref 3–16)
AST SERPL-CCNC: 13 U/L (ref 15–37)
BASOPHILS ABSOLUTE: 0.1 K/UL (ref 0–0.2)
BASOPHILS RELATIVE PERCENT: 0.7 %
BILIRUB SERPL-MCNC: <0.2 MG/DL (ref 0–1)
BILIRUBIN URINE: NEGATIVE
BLOOD, URINE: NEGATIVE
BUN BLDV-MCNC: 9 MG/DL (ref 7–20)
CALCIUM SERPL-MCNC: 8.6 MG/DL (ref 8.3–10.6)
CHLORIDE BLD-SCNC: 99 MMOL/L (ref 99–110)
CLARITY: CLEAR
CO2: 27 MMOL/L (ref 21–32)
COLOR: YELLOW
CREAT SERPL-MCNC: 0.8 MG/DL (ref 0.9–1.3)
EOSINOPHILS ABSOLUTE: 0.2 K/UL (ref 0–0.6)
EOSINOPHILS RELATIVE PERCENT: 1.5 %
GFR AFRICAN AMERICAN: >60
GFR NON-AFRICAN AMERICAN: >60
GLOBULIN: 3.2 G/DL
GLUCOSE BLD-MCNC: 119 MG/DL (ref 70–99)
GLUCOSE URINE: NEGATIVE MG/DL
HCT VFR BLD CALC: 43.9 % (ref 40.5–52.5)
HEMOGLOBIN: 14.6 G/DL (ref 13.5–17.5)
KETONES, URINE: NEGATIVE MG/DL
LEUKOCYTE ESTERASE, URINE: NEGATIVE
LYMPHOCYTES ABSOLUTE: 1.7 K/UL (ref 1–5.1)
LYMPHOCYTES RELATIVE PERCENT: 16.3 %
MCH RBC QN AUTO: 29.8 PG (ref 26–34)
MCHC RBC AUTO-ENTMCNC: 33.2 G/DL (ref 31–36)
MCV RBC AUTO: 89.8 FL (ref 80–100)
MICROSCOPIC EXAMINATION: NORMAL
MONOCYTES ABSOLUTE: 0.8 K/UL (ref 0–1.3)
MONOCYTES RELATIVE PERCENT: 7.5 %
NEUTROPHILS ABSOLUTE: 7.5 K/UL (ref 1.7–7.7)
NEUTROPHILS RELATIVE PERCENT: 74 %
NITRITE, URINE: NEGATIVE
PDW BLD-RTO: 13.4 % (ref 12.4–15.4)
PH UA: 7 (ref 5–8)
PLATELET # BLD: 274 K/UL (ref 135–450)
PMV BLD AUTO: 7.9 FL (ref 5–10.5)
POTASSIUM REFLEX MAGNESIUM: 3.8 MMOL/L (ref 3.5–5.1)
PROTEIN UA: NEGATIVE MG/DL
RBC # BLD: 4.9 M/UL (ref 4.2–5.9)
SODIUM BLD-SCNC: 135 MMOL/L (ref 136–145)
SPECIFIC GRAVITY UA: <=1.005 (ref 1–1.03)
TOTAL PROTEIN: 6.9 G/DL (ref 6.4–8.2)
URINE REFLEX TO CULTURE: NORMAL
URINE TYPE: NORMAL
UROBILINOGEN, URINE: 0.2 E.U./DL
WBC # BLD: 10.2 K/UL (ref 4–11)

## 2019-04-14 PROCEDURE — 6370000000 HC RX 637 (ALT 250 FOR IP): Performed by: PHYSICIAN ASSISTANT

## 2019-04-14 PROCEDURE — 85025 COMPLETE CBC W/AUTO DIFF WBC: CPT

## 2019-04-14 PROCEDURE — 81003 URINALYSIS AUTO W/O SCOPE: CPT

## 2019-04-14 PROCEDURE — 6360000002 HC RX W HCPCS: Performed by: PHYSICIAN ASSISTANT

## 2019-04-14 PROCEDURE — 96374 THER/PROPH/DIAG INJ IV PUSH: CPT

## 2019-04-14 PROCEDURE — 99284 EMERGENCY DEPT VISIT MOD MDM: CPT

## 2019-04-14 PROCEDURE — 71046 X-RAY EXAM CHEST 2 VIEWS: CPT

## 2019-04-14 PROCEDURE — 2580000003 HC RX 258: Performed by: PHYSICIAN ASSISTANT

## 2019-04-14 PROCEDURE — 80053 COMPREHEN METABOLIC PANEL: CPT

## 2019-04-14 PROCEDURE — 96361 HYDRATE IV INFUSION ADD-ON: CPT

## 2019-04-14 PROCEDURE — 96375 TX/PRO/DX INJ NEW DRUG ADDON: CPT

## 2019-04-14 RX ORDER — BENZONATATE 100 MG/1
100 CAPSULE ORAL 3 TIMES DAILY PRN
Qty: 30 CAPSULE | Refills: 0 | Status: SHIPPED | OUTPATIENT
Start: 2019-04-14 | End: 2019-04-21

## 2019-04-14 RX ORDER — FLUTICASONE PROPIONATE 50 MCG
1 SPRAY, SUSPENSION (ML) NASAL DAILY
Status: DISCONTINUED | OUTPATIENT
Start: 2019-04-14 | End: 2019-04-14 | Stop reason: HOSPADM

## 2019-04-14 RX ORDER — METHYLPREDNISOLONE 4 MG/1
TABLET ORAL
Qty: 1 KIT | Refills: 0 | Status: SHIPPED | OUTPATIENT
Start: 2019-04-14 | End: 2019-07-01

## 2019-04-14 RX ORDER — KETOROLAC TROMETHAMINE 30 MG/ML
30 INJECTION, SOLUTION INTRAMUSCULAR; INTRAVENOUS ONCE
Status: COMPLETED | OUTPATIENT
Start: 2019-04-14 | End: 2019-04-14

## 2019-04-14 RX ORDER — 0.9 % SODIUM CHLORIDE 0.9 %
1000 INTRAVENOUS SOLUTION INTRAVENOUS ONCE
Status: COMPLETED | OUTPATIENT
Start: 2019-04-14 | End: 2019-04-14

## 2019-04-14 RX ORDER — DEXAMETHASONE SODIUM PHOSPHATE 10 MG/ML
6 INJECTION INTRAMUSCULAR; INTRAVENOUS EVERY 6 HOURS
Status: DISCONTINUED | OUTPATIENT
Start: 2019-04-14 | End: 2019-04-14 | Stop reason: HOSPADM

## 2019-04-14 RX ORDER — DIPHENHYDRAMINE HYDROCHLORIDE 50 MG/ML
25 INJECTION INTRAMUSCULAR; INTRAVENOUS ONCE
Status: COMPLETED | OUTPATIENT
Start: 2019-04-14 | End: 2019-04-14

## 2019-04-14 RX ADMIN — PROCHLORPERAZINE EDISYLATE 10 MG: 5 INJECTION INTRAMUSCULAR; INTRAVENOUS at 17:21

## 2019-04-14 RX ADMIN — DIPHENHYDRAMINE HYDROCHLORIDE 25 MG: 50 INJECTION, SOLUTION INTRAMUSCULAR; INTRAVENOUS at 17:21

## 2019-04-14 RX ADMIN — KETOROLAC TROMETHAMINE 30 MG: 30 INJECTION, SOLUTION INTRAMUSCULAR at 17:21

## 2019-04-14 RX ADMIN — SODIUM CHLORIDE 1000 ML: 9 INJECTION, SOLUTION INTRAVENOUS at 16:38

## 2019-04-14 RX ADMIN — FLUTICASONE PROPIONATE 1 SPRAY: 50 SPRAY, METERED NASAL at 17:22

## 2019-04-14 RX ADMIN — DEXAMETHASONE SODIUM PHOSPHATE 6 MG: 10 INJECTION INTRAMUSCULAR; INTRAVENOUS at 17:21

## 2019-04-14 ASSESSMENT — PAIN SCALES - GENERAL
PAINLEVEL_OUTOF10: 7
PAINLEVEL_OUTOF10: 5

## 2019-04-14 ASSESSMENT — PAIN DESCRIPTION - LOCATION: LOCATION: HEAD

## 2019-04-14 ASSESSMENT — PAIN DESCRIPTION - DESCRIPTORS: DESCRIPTORS: ACHING

## 2019-04-14 ASSESSMENT — PAIN DESCRIPTION - FREQUENCY: FREQUENCY: CONTINUOUS

## 2019-04-14 NOTE — ED PROVIDER NOTES
Packs/day: 1.00     Years: 20.00     Pack years: 20.00     Types: Cigarettes     Last attempt to quit: 2013     Years since quittin.4    Smokeless tobacco: Never Used    Tobacco comment: Pt states he hasn't smoked in 6 days   Substance and Sexual Activity    Alcohol use: No     Comment: no alcohol for 2 years    Drug use: No    Sexual activity: Not Currently     Partners: Female   Lifestyle    Physical activity:     Days per week: Not on file     Minutes per session: Not on file    Stress: Not on file   Relationships    Social connections:     Talks on phone: Not on file     Gets together: Not on file     Attends Protestant service: Not on file     Active member of club or organization: Not on file     Attends meetings of clubs or organizations: Not on file     Relationship status: Not on file    Intimate partner violence:     Fear of current or ex partner: Not on file     Emotionally abused: Not on file     Physically abused: Not on file     Forced sexual activity: Not on file   Other Topics Concern    Not on file   Social History Narrative    Not on file     Current Facility-Administered Medications   Medication Dose Route Frequency Provider Last Rate Last Dose    dexamethasone (DECADRON) injection 6 mg  6 mg Intravenous Q6H Sammie Duran PA-C   6 mg at 19 1721    fluticasone (FLONASE) 50 MCG/ACT nasal spray 1 spray  1 spray Each Nare Daily Sammie Duran PA-C   1 spray at 19 1722     Current Outpatient Medications   Medication Sig Dispense Refill    methylPREDNISolone (MEDROL, JOSE ANTONIO,) 4 MG tablet Take by mouth. 1 kit 0    benzonatate (TESSALON PERLES) 100 MG capsule Take 1 capsule by mouth 3 times daily as needed for Cough 30 capsule 0    albuterol sulfate HFA (PROAIR HFA) 108 (90 Base) MCG/ACT inhaler Use every 4 hours while awake for 7-10 days then PRN wheezing  Dispense with SPACER and Instruct on use. May sub Ventolin or Proventil as needed per [employer].  1 Inhaler 1    guaiFENesin (MUCINEX) 600 MG extended release tablet Take 1 tablet by mouth 2 times daily for 15 days 30 tablet 0    benzonatate (TESSALON PERLES) 100 MG capsule Take 1 capsule by mouth 3 times daily as needed for Cough 20 capsule 0      No Known Allergies    REVIEW OF SYSTEMS  Review of Systems  10 systems reviewed, pertinent positives per HPI otherwise noted to be negative. PHYSICAL EXAM  BP (!) 153/87   Pulse 89   Temp 98.3 °F (36.8 °C) (Oral)   Resp 20   Ht 5' 9.5\" (1.765 m)   Wt (!) 315 lb (142.9 kg)   SpO2 93%   BMI 45.85 kg/m²  on room air  GENERALAPPEARANCE: Awake and alert. Cooperative. No acute distress. Morbid obesity  HEAD: Normocephalic. Atraumatic. EYES: PERRL. EOM's grossly intact. No scleral injection or icterus. ENT: Mucous membranes are moist.   NECK: Supple. No tracheal deviation. HEART: RRR. LUNGS: Respirations unlabored. CTAB. Good air exchange. Coarse rhonchi throughout. ABDOMEN: Soft. Non-distended. Non-tender. No guarding or rebound. Normal bowel sounds. EXTREMITIES: No peripheral edema. Moves all extremities equally. All extremities neurovascularly intact. SKIN: Warm and dry. No acute rashes. NEUROLOGICAL: Alert and oriented. No gross facial drooping. Strength 5/5, sensation intact. Normal coordination. Gait is steady. PSYCHIATRIC: Normal mood and affect. LABS  I have reviewed all labs for this visit.    Results for orders placed or performed during the hospital encounter of 04/14/19   CBC Auto Differential   Result Value Ref Range    WBC 10.2 4.0 - 11.0 K/uL    RBC 4.90 4.20 - 5.90 M/uL    Hemoglobin 14.6 13.5 - 17.5 g/dL    Hematocrit 43.9 40.5 - 52.5 %    MCV 89.8 80.0 - 100.0 fL    MCH 29.8 26.0 - 34.0 pg    MCHC 33.2 31.0 - 36.0 g/dL    RDW 13.4 12.4 - 15.4 %    Platelets 236 002 - 888 K/uL    MPV 7.9 5.0 - 10.5 fL    Neutrophils % 74.0 %    Lymphocytes % 16.3 %    Monocytes % 7.5 %    Eosinophils % 1.5 %    Basophils % 0.7 %    Neutrophils # 7.5 1.7 - 7.7 K/uL Initial Relevant Medical/Surgical History: SMOKER HTN PNA FINDINGS: No infiltrate or consolidation or effusion is identified. The heart size is top normal.  There is a mild dextrocurvature of the thoracic spine and there are mild multilevel degenerative changes. No acute abnormality visualized. ED COURSE/MDM  Afebrile, stable pt presents to the ED. SPO2 on room air 93%. Patient has a cough which is causing him a headache over the past several days normal neurological examination patient course rhonchi throughout his lungs. He states he has been taking Mucinex with no improvement in his symptoms. Provided with Decadron Flonase Compazine and Benadryl and Toradol while in the ED in addition IV fluids to help her symptoms. Labs returned revealing no acute findings with the exception of mild hyperglycemia at 119 and mild hyponatremia with a sodium of 135. Chest x-ray is ordered and evaluated which shows no acute finding this time. Patient was sent home with a very short course of steroids advised to watch his glucose very very closely patient does verbalize understanding he will be discharged in stable condition. Patient seen and evaluated. Discussed H&P with supervising physician, aware of results and agrees w plan/disposition. Patient is seen independently per my scope of practice   Old records reviewed.  Labs and imaging reviewed diannsugiovanny discussed   Patient was given the following medications in the ED:  Medications   dexamethasone (DECADRON) injection 6 mg (6 mg Intravenous Given 4/14/19 1721)   fluticasone (FLONASE) 50 MCG/ACT nasal spray 1 spray (1 spray Each Nare Given 4/14/19 1722)   0.9 % sodium chloride bolus (0 mLs Intravenous Stopped 4/14/19 1819)   prochlorperazine (COMPAZINE) injection 10 mg (10 mg Intravenous Given 4/14/19 1721)   diphenhydrAMINE (BENADRYL) injection 25 mg (25 mg Intravenous Given 4/14/19 1721)   ketorolac (TORADOL) injection 30 mg (30 mg Intravenous Given 4/14/19 1721)     At this time, patient is ready for d/c,   Results for orders placed or performed during the hospital encounter of 04/14/19   CBC Auto Differential   Result Value Ref Range    WBC 10.2 4.0 - 11.0 K/uL    RBC 4.90 4.20 - 5.90 M/uL    Hemoglobin 14.6 13.5 - 17.5 g/dL    Hematocrit 43.9 40.5 - 52.5 %    MCV 89.8 80.0 - 100.0 fL    MCH 29.8 26.0 - 34.0 pg    MCHC 33.2 31.0 - 36.0 g/dL    RDW 13.4 12.4 - 15.4 %    Platelets 549 490 - 388 K/uL    MPV 7.9 5.0 - 10.5 fL    Neutrophils % 74.0 %    Lymphocytes % 16.3 %    Monocytes % 7.5 %    Eosinophils % 1.5 %    Basophils % 0.7 %    Neutrophils # 7.5 1.7 - 7.7 K/uL    Lymphocytes # 1.7 1.0 - 5.1 K/uL    Monocytes # 0.8 0.0 - 1.3 K/uL    Eosinophils # 0.2 0.0 - 0.6 K/uL    Basophils # 0.1 0.0 - 0.2 K/uL   Comprehensive Metabolic Panel w/ Reflex to MG   Result Value Ref Range    Sodium 135 (L) 136 - 145 mmol/L    Potassium reflex Magnesium 3.8 3.5 - 5.1 mmol/L    Chloride 99 99 - 110 mmol/L    CO2 27 21 - 32 mmol/L    Anion Gap 9 3 - 16    Glucose 119 (H) 70 - 99 mg/dL    BUN 9 7 - 20 mg/dL    CREATININE 0.8 (L) 0.9 - 1.3 mg/dL    GFR Non-African American >60 >60    GFR African American >60 >60    Calcium 8.6 8.3 - 10.6 mg/dL    Total Protein 6.9 6.4 - 8.2 g/dL    Alb 3.7 3.4 - 5.0 g/dL    Albumin/Globulin Ratio 1.2 1.1 - 2.2    Total Bilirubin <0.2 0.0 - 1.0 mg/dL    Alkaline Phosphatase 58 40 - 129 U/L    ALT 15 10 - 40 U/L    AST 13 (L) 15 - 37 U/L    Globulin 3.2 g/dL   Urinalysis Reflex to Culture   Result Value Ref Range    Color, UA Yellow Straw/Yellow    Clarity, UA Clear Clear    Glucose, Ur Negative Negative mg/dL    Bilirubin Urine Negative Negative    Ketones, Urine Negative Negative mg/dL    Specific Gravity, UA <=1.005 1.005 - 1.030    Blood, Urine Negative Negative    pH, UA 7.0 5.0 - 8.0    Protein, UA Negative Negative mg/dL    Urobilinogen, Urine 0.2 <2.0 E.U./dL    Nitrite, Urine Negative Negative    Leukocyte Esterase, Urine Negative Negative Microscopic Examination Not Indicated     Urine Reflex to Culture Not Indicated     Urine Type Not Specified        I estimate there is LOW risk for SUBARACHNOID HEMORRHAGE, MENINGITIS, INTRACRANIAL HEMORRHAGE, SUBDURAL HEMATOMA, OR STROKE, thus I consider the discharge disposition reasonable. Marina Goetz and I have discussed the diagnosis and risks, and we agree with discharging home to follow-up with their primary doctor. We also discussed returning to the Emergency Department immediately if new or worsening symptoms occur. We have discussed the symptoms which are most concerning (e.g., changing or worsening pain, weakness, vomiting, fever) that necessitate immediate return. Final Impression    1. Nonintractable headache, unspecified chronicity pattern, unspecified headache type    2. Cough    3. Essential hypertension        Discharge Vital Signs:  Blood pressure (!) 153/87, pulse 89, temperature 98.3 °F (36.8 °C), temperature source Oral, resp. rate 20, height 5' 9.5\" (1.765 m), weight (!) 315 lb (142.9 kg), SpO2 93 %. Patient was given scripts for the following medications. I counseled patient how to take these medications. New Prescriptions    BENZONATATE (TESSALON PERLES) 100 MG CAPSULE    Take 1 capsule by mouth 3 times daily as needed for Cough    METHYLPREDNISOLONE (MEDROL, JOSE ANTONIO,) 4 MG TABLET    Take by mouth. CLINICAL IMPRESSION  1. Nonintractable headache, unspecified chronicity pattern, unspecified headache type    2. Cough    3. Essential hypertension        Blood pressure (!) 153/87, pulse 89, temperature 98.3 °F (36.8 °C), temperature source Oral, resp. rate 20, height 5' 9.5\" (1.765 m), weight (!) 315 lb (142.9 kg), SpO2 93 %. DISPOSITION  Marina Goetz is in stable condition upon Discharge to home.           Susan Mancia PA-C  04/14/19 1929

## 2019-05-09 ENCOUNTER — HOSPITAL ENCOUNTER (OUTPATIENT)
Dept: GENERAL RADIOLOGY | Age: 49
Discharge: HOME | End: 2019-05-09
Payer: MEDICARE

## 2019-05-09 ENCOUNTER — HOSPITAL ENCOUNTER (OUTPATIENT)
Age: 49
Discharge: HOME | End: 2019-05-09
Payer: MEDICARE

## 2019-05-09 DIAGNOSIS — M25.552 BILATERAL HIP PAIN: ICD-10-CM

## 2019-05-09 DIAGNOSIS — M25.551 BILATERAL HIP PAIN: ICD-10-CM

## 2019-05-09 PROCEDURE — 73521 X-RAY EXAM HIPS BI 2 VIEWS: CPT

## 2019-05-28 ENCOUNTER — OFFICE VISIT (OUTPATIENT)
Dept: ORTHOPEDIC SURGERY | Age: 49
End: 2019-05-28
Payer: MEDICARE

## 2019-05-28 VITALS — HEIGHT: 70 IN | RESPIRATION RATE: 14 BRPM | WEIGHT: 306 LBS | BODY MASS INDEX: 43.81 KG/M2

## 2019-05-28 DIAGNOSIS — M16.0 BILATERAL PRIMARY OSTEOARTHRITIS OF HIP: Primary | ICD-10-CM

## 2019-05-28 PROCEDURE — G8417 CALC BMI ABV UP PARAM F/U: HCPCS | Performed by: ORTHOPAEDIC SURGERY

## 2019-05-28 PROCEDURE — G8427 DOCREV CUR MEDS BY ELIG CLIN: HCPCS | Performed by: ORTHOPAEDIC SURGERY

## 2019-05-28 PROCEDURE — 99243 OFF/OP CNSLTJ NEW/EST LOW 30: CPT | Performed by: ORTHOPAEDIC SURGERY

## 2019-05-28 RX ORDER — MELOXICAM 15 MG/1
15 TABLET ORAL DAILY
Qty: 30 TABLET | Refills: 3 | Status: SHIPPED | OUTPATIENT
Start: 2019-05-28 | End: 2019-07-01

## 2019-07-01 ENCOUNTER — HOSPITAL ENCOUNTER (EMERGENCY)
Age: 49
Discharge: HOME | End: 2019-07-01
Payer: MEDICARE

## 2019-07-01 VITALS
SYSTOLIC BLOOD PRESSURE: 152 MMHG | OXYGEN SATURATION: 94 % | DIASTOLIC BLOOD PRESSURE: 94 MMHG | RESPIRATION RATE: 16 BRPM | TEMPERATURE: 98.2 F | HEART RATE: 101 BPM

## 2019-07-01 DIAGNOSIS — R45.0 FEELING JITTERY: Primary | ICD-10-CM

## 2019-07-01 DIAGNOSIS — Z86.59 HISTORY OF ANXIETY: ICD-10-CM

## 2019-07-01 PROCEDURE — 99283 EMERGENCY DEPT VISIT LOW MDM: CPT

## 2019-07-01 PROCEDURE — 93005 ELECTROCARDIOGRAM TRACING: CPT | Performed by: NURSE PRACTITIONER

## 2019-07-01 RX ORDER — HYDROXYZINE PAMOATE 25 MG/1
25 CAPSULE ORAL 3 TIMES DAILY PRN
Qty: 30 CAPSULE | Refills: 0 | Status: SHIPPED | OUTPATIENT
Start: 2019-07-01 | End: 2019-07-15

## 2019-07-01 RX ORDER — ESCITALOPRAM OXALATE 10 MG/1
10 TABLET ORAL NIGHTLY
COMMUNITY

## 2019-07-01 ASSESSMENT — ENCOUNTER SYMPTOMS
SHORTNESS OF BREATH: 0
ABDOMINAL PAIN: 0

## 2019-07-01 NOTE — ED PROVIDER NOTES
(TESSALON PERLES) 100 MG capsule Comments:   Reason for Stopping:                      (Please note that portions of this note were completed with a voice recognition program.  Efforts were made to edit the dictations but occasionally words are mis-transcribed.)    ANJEL Sanches CNP (electronically signed)         ANJEL Sanches CNP  07/01/19 ANJEL Esquivel CNP  07/01/19 [facility]

## 2019-07-02 LAB
EKG ATRIAL RATE: 84 BPM
EKG DIAGNOSIS: NORMAL
EKG P AXIS: 51 DEGREES
EKG P-R INTERVAL: 174 MS
EKG Q-T INTERVAL: 356 MS
EKG QRS DURATION: 80 MS
EKG QTC CALCULATION (BAZETT): 420 MS
EKG R AXIS: 53 DEGREES
EKG T AXIS: 46 DEGREES
EKG VENTRICULAR RATE: 84 BPM

## 2019-07-02 PROCEDURE — 93010 ELECTROCARDIOGRAM REPORT: CPT | Performed by: INTERNAL MEDICINE

## 2019-09-19 DIAGNOSIS — M16.0 BILATERAL PRIMARY OSTEOARTHRITIS OF HIP: ICD-10-CM

## 2019-09-19 RX ORDER — MELOXICAM 15 MG/1
TABLET ORAL
Qty: 30 TABLET | Refills: 3 | Status: ON HOLD | OUTPATIENT
Start: 2019-09-19 | End: 2020-06-07

## 2019-11-04 ENCOUNTER — HOSPITAL ENCOUNTER (EMERGENCY)
Age: 49
Discharge: HOME | End: 2019-11-04
Payer: MEDICARE

## 2019-11-04 VITALS
TEMPERATURE: 97.7 F | HEART RATE: 94 BPM | RESPIRATION RATE: 16 BRPM | HEIGHT: 69 IN | WEIGHT: 315 LBS | DIASTOLIC BLOOD PRESSURE: 90 MMHG | SYSTOLIC BLOOD PRESSURE: 156 MMHG | BODY MASS INDEX: 46.65 KG/M2 | OXYGEN SATURATION: 94 %

## 2019-11-04 DIAGNOSIS — L74.512 HYPERHIDROSIS OF PALMS: Primary | ICD-10-CM

## 2019-11-04 PROCEDURE — 99282 EMERGENCY DEPT VISIT SF MDM: CPT

## 2019-11-04 ASSESSMENT — ENCOUNTER SYMPTOMS
NAUSEA: 0
SHORTNESS OF BREATH: 0
ABDOMINAL PAIN: 0
VOMITING: 0

## 2020-06-06 ENCOUNTER — HOSPITAL ENCOUNTER (INPATIENT)
Age: 50
LOS: 2 days | Discharge: HOME | DRG: 139 | End: 2020-06-09
Attending: STUDENT IN AN ORGANIZED HEALTH CARE EDUCATION/TRAINING PROGRAM | Admitting: INTERNAL MEDICINE
Payer: MEDICARE

## 2020-06-06 ENCOUNTER — APPOINTMENT (OUTPATIENT)
Dept: CT IMAGING | Age: 50
DRG: 139 | End: 2020-06-06
Payer: MEDICARE

## 2020-06-06 ENCOUNTER — APPOINTMENT (OUTPATIENT)
Dept: GENERAL RADIOLOGY | Age: 50
DRG: 139 | End: 2020-06-06
Payer: MEDICARE

## 2020-06-06 LAB
A/G RATIO: 0.9 (ref 1.1–2.2)
ALBUMIN SERPL-MCNC: 3.4 G/DL (ref 3.4–5)
ALP BLD-CCNC: 76 U/L (ref 40–129)
ALT SERPL-CCNC: 29 U/L (ref 10–40)
ANION GAP SERPL CALCULATED.3IONS-SCNC: 12 MMOL/L (ref 3–16)
AST SERPL-CCNC: 27 U/L (ref 15–37)
BASE EXCESS VENOUS: 3 MMOL/L (ref -3–3)
BASOPHILS ABSOLUTE: 0.1 K/UL (ref 0–0.2)
BASOPHILS RELATIVE PERCENT: 0.8 %
BILIRUB SERPL-MCNC: 0.3 MG/DL (ref 0–1)
BUN BLDV-MCNC: 13 MG/DL (ref 7–20)
CALCIUM SERPL-MCNC: 9.2 MG/DL (ref 8.3–10.6)
CARBOXYHEMOGLOBIN: 2.5 % (ref 0–1.5)
CHLORIDE BLD-SCNC: 98 MMOL/L (ref 99–110)
CO2: 20 MMOL/L (ref 21–32)
CREAT SERPL-MCNC: 0.7 MG/DL (ref 0.9–1.3)
EOSINOPHILS ABSOLUTE: 0.1 K/UL (ref 0–0.6)
EOSINOPHILS RELATIVE PERCENT: 0.9 %
GFR AFRICAN AMERICAN: >60
GFR NON-AFRICAN AMERICAN: >60
GLOBULIN: 3.6 G/DL
GLUCOSE BLD-MCNC: 131 MG/DL (ref 70–99)
HCO3 VENOUS: 28.7 MMOL/L (ref 23–29)
HCT VFR BLD CALC: 43.7 % (ref 40.5–52.5)
HEMOGLOBIN: 14.3 G/DL (ref 13.5–17.5)
LACTIC ACID: 1.2 MMOL/L (ref 0.4–2)
LYMPHOCYTES ABSOLUTE: 2 K/UL (ref 1–5.1)
LYMPHOCYTES RELATIVE PERCENT: 13.6 %
MCH RBC QN AUTO: 29.4 PG (ref 26–34)
MCHC RBC AUTO-ENTMCNC: 32.8 G/DL (ref 31–36)
MCV RBC AUTO: 89.6 FL (ref 80–100)
METHEMOGLOBIN VENOUS: 0.3 %
MONOCYTES ABSOLUTE: 1.4 K/UL (ref 0–1.3)
MONOCYTES RELATIVE PERCENT: 9.3 %
NEUTROPHILS ABSOLUTE: 11.3 K/UL (ref 1.7–7.7)
NEUTROPHILS RELATIVE PERCENT: 75.4 %
O2 CONTENT, VEN: 18 VOL %
O2 SAT, VEN: 85 %
O2 THERAPY: ABNORMAL
PCO2, VEN: 47.9 MMHG (ref 40–50)
PDW BLD-RTO: 14.7 % (ref 12.4–15.4)
PH VENOUS: 7.4 (ref 7.35–7.45)
PLATELET # BLD: 260 K/UL (ref 135–450)
PMV BLD AUTO: 9.1 FL (ref 5–10.5)
PO2, VEN: 50.2 MMHG (ref 25–40)
POTASSIUM SERPL-SCNC: 5 MMOL/L (ref 3.5–5.1)
PRO-BNP: 68 PG/ML (ref 0–124)
PROCALCITONIN: 0.07 NG/ML (ref 0–0.15)
RBC # BLD: 4.87 M/UL (ref 4.2–5.9)
SODIUM BLD-SCNC: 130 MMOL/L (ref 136–145)
TCO2 CALC VENOUS: 30 MMOL/L
TOTAL PROTEIN: 7 G/DL (ref 6.4–8.2)
TROPONIN: <0.01 NG/ML
WBC # BLD: 15 K/UL (ref 4–11)

## 2020-06-06 PROCEDURE — 85025 COMPLETE CBC W/AUTO DIFF WBC: CPT

## 2020-06-06 PROCEDURE — 84145 PROCALCITONIN (PCT): CPT

## 2020-06-06 PROCEDURE — 96375 TX/PRO/DX INJ NEW DRUG ADDON: CPT

## 2020-06-06 PROCEDURE — 83880 ASSAY OF NATRIURETIC PEPTIDE: CPT

## 2020-06-06 PROCEDURE — 6360000004 HC RX CONTRAST MEDICATION: Performed by: STUDENT IN AN ORGANIZED HEALTH CARE EDUCATION/TRAINING PROGRAM

## 2020-06-06 PROCEDURE — 99285 EMERGENCY DEPT VISIT HI MDM: CPT

## 2020-06-06 PROCEDURE — 87040 BLOOD CULTURE FOR BACTERIA: CPT

## 2020-06-06 PROCEDURE — 80053 COMPREHEN METABOLIC PANEL: CPT

## 2020-06-06 PROCEDURE — 71260 CT THORAX DX C+: CPT

## 2020-06-06 PROCEDURE — 6370000000 HC RX 637 (ALT 250 FOR IP): Performed by: STUDENT IN AN ORGANIZED HEALTH CARE EDUCATION/TRAINING PROGRAM

## 2020-06-06 PROCEDURE — 2580000003 HC RX 258: Performed by: STUDENT IN AN ORGANIZED HEALTH CARE EDUCATION/TRAINING PROGRAM

## 2020-06-06 PROCEDURE — 71045 X-RAY EXAM CHEST 1 VIEW: CPT

## 2020-06-06 PROCEDURE — 83605 ASSAY OF LACTIC ACID: CPT

## 2020-06-06 PROCEDURE — 82803 BLOOD GASES ANY COMBINATION: CPT

## 2020-06-06 PROCEDURE — 84484 ASSAY OF TROPONIN QUANT: CPT

## 2020-06-06 PROCEDURE — 6360000002 HC RX W HCPCS: Performed by: STUDENT IN AN ORGANIZED HEALTH CARE EDUCATION/TRAINING PROGRAM

## 2020-06-06 PROCEDURE — 96365 THER/PROPH/DIAG IV INF INIT: CPT

## 2020-06-06 RX ORDER — DEXAMETHASONE SODIUM PHOSPHATE 10 MG/ML
10 INJECTION, SOLUTION INTRAMUSCULAR; INTRAVENOUS ONCE
Status: COMPLETED | OUTPATIENT
Start: 2020-06-06 | End: 2020-06-06

## 2020-06-06 RX ORDER — IPRATROPIUM BROMIDE AND ALBUTEROL SULFATE 2.5; .5 MG/3ML; MG/3ML
1 SOLUTION RESPIRATORY (INHALATION) ONCE
Status: COMPLETED | OUTPATIENT
Start: 2020-06-06 | End: 2020-06-06

## 2020-06-06 RX ORDER — LEVOFLOXACIN 5 MG/ML
750 INJECTION, SOLUTION INTRAVENOUS ONCE
Status: COMPLETED | OUTPATIENT
Start: 2020-06-06 | End: 2020-06-07

## 2020-06-06 RX ADMIN — DEXAMETHASONE SODIUM PHOSPHATE 10 MG: 10 INJECTION, SOLUTION INTRAMUSCULAR; INTRAVENOUS at 23:00

## 2020-06-06 RX ADMIN — IPRATROPIUM BROMIDE AND ALBUTEROL SULFATE 1 AMPULE: .5; 3 SOLUTION RESPIRATORY (INHALATION) at 22:59

## 2020-06-06 RX ADMIN — IOPAMIDOL 85 ML: 755 INJECTION, SOLUTION INTRAVENOUS at 23:29

## 2020-06-06 RX ADMIN — CEFEPIME 2 G: 2 INJECTION, POWDER, FOR SOLUTION INTRAVENOUS at 22:59

## 2020-06-07 PROBLEM — J44.1 COPD EXACERBATION (HCC): Status: ACTIVE | Noted: 2020-06-07

## 2020-06-07 LAB
TROPONIN: <0.01 NG/ML
TROPONIN: <0.01 NG/ML

## 2020-06-07 PROCEDURE — 6360000002 HC RX W HCPCS: Performed by: HOSPITALIST

## 2020-06-07 PROCEDURE — 96367 TX/PROPH/DG ADDL SEQ IV INF: CPT

## 2020-06-07 PROCEDURE — 6370000000 HC RX 637 (ALT 250 FOR IP): Performed by: INTERNAL MEDICINE

## 2020-06-07 PROCEDURE — 6360000002 HC RX W HCPCS: Performed by: INTERNAL MEDICINE

## 2020-06-07 PROCEDURE — 94640 AIRWAY INHALATION TREATMENT: CPT

## 2020-06-07 PROCEDURE — 36415 COLL VENOUS BLD VENIPUNCTURE: CPT

## 2020-06-07 PROCEDURE — 1200000000 HC SEMI PRIVATE

## 2020-06-07 PROCEDURE — 94761 N-INVAS EAR/PLS OXIMETRY MLT: CPT

## 2020-06-07 PROCEDURE — 6370000000 HC RX 637 (ALT 250 FOR IP): Performed by: HOSPITALIST

## 2020-06-07 PROCEDURE — 2580000003 HC RX 258: Performed by: HOSPITALIST

## 2020-06-07 PROCEDURE — U0003 INFECTIOUS AGENT DETECTION BY NUCLEIC ACID (DNA OR RNA); SEVERE ACUTE RESPIRATORY SYNDROME CORONAVIRUS 2 (SARS-COV-2) (CORONAVIRUS DISEASE [COVID-19]), AMPLIFIED PROBE TECHNIQUE, MAKING USE OF HIGH THROUGHPUT TECHNOLOGIES AS DESCRIBED BY CMS-2020-01-R: HCPCS

## 2020-06-07 PROCEDURE — 87086 URINE CULTURE/COLONY COUNT: CPT

## 2020-06-07 PROCEDURE — 84484 ASSAY OF TROPONIN QUANT: CPT

## 2020-06-07 PROCEDURE — 2580000003 HC RX 258

## 2020-06-07 PROCEDURE — 6360000002 HC RX W HCPCS: Performed by: STUDENT IN AN ORGANIZED HEALTH CARE EDUCATION/TRAINING PROGRAM

## 2020-06-07 RX ORDER — PREDNISONE 20 MG/1
40 TABLET ORAL DAILY
Status: DISCONTINUED | OUTPATIENT
Start: 2020-06-07 | End: 2020-06-09 | Stop reason: HOSPADM

## 2020-06-07 RX ORDER — ACETAMINOPHEN 325 MG/1
650 TABLET ORAL EVERY 6 HOURS PRN
Status: DISCONTINUED | OUTPATIENT
Start: 2020-06-07 | End: 2020-06-09 | Stop reason: HOSPADM

## 2020-06-07 RX ORDER — FUROSEMIDE 10 MG/ML
20 INJECTION INTRAMUSCULAR; INTRAVENOUS ONCE
Status: COMPLETED | OUTPATIENT
Start: 2020-06-07 | End: 2020-06-07

## 2020-06-07 RX ORDER — SODIUM CHLORIDE 0.9 % (FLUSH) 0.9 %
10 SYRINGE (ML) INJECTION EVERY 12 HOURS SCHEDULED
Status: DISCONTINUED | OUTPATIENT
Start: 2020-06-07 | End: 2020-06-09 | Stop reason: HOSPADM

## 2020-06-07 RX ORDER — ACETAMINOPHEN 650 MG/1
650 SUPPOSITORY RECTAL EVERY 6 HOURS PRN
Status: DISCONTINUED | OUTPATIENT
Start: 2020-06-07 | End: 2020-06-09 | Stop reason: HOSPADM

## 2020-06-07 RX ORDER — POLYETHYLENE GLYCOL 3350 17 G/17G
17 POWDER, FOR SOLUTION ORAL DAILY PRN
Status: DISCONTINUED | OUTPATIENT
Start: 2020-06-07 | End: 2020-06-09 | Stop reason: HOSPADM

## 2020-06-07 RX ORDER — ATORVASTATIN CALCIUM 10 MG/1
TABLET, FILM COATED ORAL NIGHTLY
COMMUNITY

## 2020-06-07 RX ORDER — ALBUTEROL SULFATE 90 UG/1
2 AEROSOL, METERED RESPIRATORY (INHALATION) 4 TIMES DAILY
Status: DISCONTINUED | OUTPATIENT
Start: 2020-06-07 | End: 2020-06-07

## 2020-06-07 RX ORDER — ONDANSETRON 2 MG/ML
4 INJECTION INTRAMUSCULAR; INTRAVENOUS EVERY 6 HOURS PRN
Status: DISCONTINUED | OUTPATIENT
Start: 2020-06-07 | End: 2020-06-09 | Stop reason: HOSPADM

## 2020-06-07 RX ORDER — SODIUM CHLORIDE 0.9 % (FLUSH) 0.9 %
10 SYRINGE (ML) INJECTION PRN
Status: DISCONTINUED | OUTPATIENT
Start: 2020-06-07 | End: 2020-06-09 | Stop reason: HOSPADM

## 2020-06-07 RX ORDER — PROMETHAZINE HYDROCHLORIDE 25 MG/1
12.5 TABLET ORAL EVERY 6 HOURS PRN
Status: DISCONTINUED | OUTPATIENT
Start: 2020-06-07 | End: 2020-06-09 | Stop reason: HOSPADM

## 2020-06-07 RX ORDER — ATORVASTATIN CALCIUM 10 MG/1
10 TABLET, FILM COATED ORAL NIGHTLY
Status: DISCONTINUED | OUTPATIENT
Start: 2020-06-07 | End: 2020-06-09 | Stop reason: HOSPADM

## 2020-06-07 RX ORDER — ALBUTEROL SULFATE 90 UG/1
2 AEROSOL, METERED RESPIRATORY (INHALATION) EVERY 4 HOURS PRN
Status: DISCONTINUED | OUTPATIENT
Start: 2020-06-07 | End: 2020-06-09 | Stop reason: HOSPADM

## 2020-06-07 RX ORDER — ESCITALOPRAM OXALATE 10 MG/1
10 TABLET ORAL NIGHTLY
Status: DISCONTINUED | OUTPATIENT
Start: 2020-06-07 | End: 2020-06-09 | Stop reason: HOSPADM

## 2020-06-07 RX ORDER — ALBUTEROL SULFATE 90 UG/1
2 AEROSOL, METERED RESPIRATORY (INHALATION) 2 TIMES DAILY
Status: DISCONTINUED | OUTPATIENT
Start: 2020-06-07 | End: 2020-06-09 | Stop reason: HOSPADM

## 2020-06-07 RX ORDER — SODIUM CHLORIDE 9 MG/ML
INJECTION, SOLUTION INTRAVENOUS
Status: COMPLETED
Start: 2020-06-07 | End: 2020-06-07

## 2020-06-07 RX ADMIN — Medication 1.5 G: at 05:26

## 2020-06-07 RX ADMIN — ENOXAPARIN SODIUM 40 MG: 40 INJECTION SUBCUTANEOUS at 08:42

## 2020-06-07 RX ADMIN — CEFEPIME 2 G: 2 INJECTION, POWDER, FOR SOLUTION INTRAVENOUS at 13:12

## 2020-06-07 RX ADMIN — ESCITALOPRAM OXALATE 10 MG: 10 TABLET ORAL at 21:55

## 2020-06-07 RX ADMIN — Medication 2 PUFF: at 20:10

## 2020-06-07 RX ADMIN — Medication 1.5 G: at 17:06

## 2020-06-07 RX ADMIN — CEFEPIME 2 G: 2 INJECTION, POWDER, FOR SOLUTION INTRAVENOUS at 23:28

## 2020-06-07 RX ADMIN — FUROSEMIDE 20 MG: 10 INJECTION, SOLUTION INTRAVENOUS at 13:09

## 2020-06-07 RX ADMIN — SODIUM CHLORIDE: 9 INJECTION, SOLUTION INTRAVENOUS at 06:12

## 2020-06-07 RX ADMIN — Medication 10 ML: at 21:55

## 2020-06-07 RX ADMIN — Medication 10 ML: at 08:42

## 2020-06-07 RX ADMIN — LEVOFLOXACIN 750 MG: 5 INJECTION, SOLUTION INTRAVENOUS at 00:21

## 2020-06-07 RX ADMIN — PREDNISONE 40 MG: 20 TABLET ORAL at 08:42

## 2020-06-07 RX ADMIN — ATORVASTATIN CALCIUM 10 MG: 10 TABLET, FILM COATED ORAL at 23:28

## 2020-06-07 NOTE — ED PROVIDER NOTES
Primary Care Physician: *[facility]   Attending Physician: Jie Haas, *     History   Chief Complaint   Patient presents with    Shortness of Breath    Cough     Pt states has had a cough for about a month, now pt with SOB when walking any distance. Pt was given prednisone and amoxicillin for cough has finished both and not getting better. RICHARD Olivo is a 53 y.o. male with history of bipolar, hypertension, hyperlipidemia, IBS, pneumonias in the past who presents this evening with complaint of cough which is now associated with shortness of breath worse with exertion. Symptoms have been going on now for weeks and he just completed a dose of antibiotics with prednisone per PCP. Denies fevers, chills, nausea, vomiting, exposures to persons infected with coronavirus. He does not wear oxygen at home but now currently on 4 L of oxygen. He also has some leg swelling.      Past Medical History:   Diagnosis Date    Anxiety     Bipolar 1 disorder, manic, mild ([address] 11/8/2016    COPD exacerbation ([address] 6/7/2020    Gastroesophageal reflux disease without esophagitis 6/3/2016    HTN (hypertension) 8/7/2015    Hyperlipidemia     IBS (irritable bowel syndrome) 3/21/2013    MDD (major depressive disorder) 10/28/2013    Migraine syndrome 3/21/2013    Pneumonia     Tobacco abuse         Past Surgical History:   Procedure Laterality Date    CHOLECYSTECTOMY          Family History   Problem Relation Age of Onset    Heart Disease Father     Dementia Mother     Asthma Neg Hx     Cancer Neg Hx     Diabetes Neg Hx     Heart Failure Neg Hx     Hypertension Neg Hx     Emphysema Neg Hx         Social History     Socioeconomic History    Marital status:      Spouse name: None    Number of children: None    Years of education: None    Highest education level: None   Occupational History    None   Social Needs    Financial resource strain: None    Food insecurity Worry: None     Inability: None    Transportation needs     Medical: None     Non-medical: None   Tobacco Use    Smoking status: Former Smoker     Packs/day: 1.00     Years: 20.00     Pack years: 20.00     Types: Cigarettes     Last attempt to quit: 2013     Years since quittin.5    Smokeless tobacco: Never Used    Tobacco comment: Pt states he hasn't smoked in 6 days   Substance and Sexual Activity    Alcohol use: No     Comment: no alcohol for 2 years    Drug use: No    Sexual activity: Not Currently     Partners: Female   Lifestyle    Physical activity     Days per week: None     Minutes per session: None    Stress: None   Relationships    Social connections     Talks on phone: None     Gets together: None     Attends Anglican service: None     Active member of club or organization: None     Attends meetings of clubs or organizations: None     Relationship status: None    Intimate partner violence     Fear of current or ex partner: None     Emotionally abused: None     Physically abused: None     Forced sexual activity: None   Other Topics Concern    None   Social History Narrative    None        Review of Systems   10 total systems reviewed and found to be negative unless otherwise noted in HPI     Physical Exam   /72   Pulse 77   Temp 97.4 °F (36.3 °C) (Oral)   Resp 18   Ht 5' 9.5\" (1.765 m)   Wt (!) 326 lb (147.9 kg)   SpO2 95%   BMI 47.45 kg/m²      CONSTITUTIONAL: Well appearing, appears to be in distress  HEAD: atraumatic, normocephalic   EYES: PERRL, No injection, discharge or scleral icterus. ENT: Moist mucous membranes. NECK: Normal ROM, NO LAD   CARDIOVASCULAR: Regular rate and rhythm. No murmurs or gallop. PULMONARY/CHEST: Wheezing bilaterally and hypoxic requiring oxygen. ABDOMEN: Soft, Non-distended and non-tender, without guarding or rebound.    SKIN: Acyanotic, warm, dry   MUSCULOSKELETAL: No swelling, tenderness or deformity   NEUROLOGICAL: Awake and [id number]   Phone [phone]   CBC WITH AUTO DIFFERENTIAL - Abnormal; Notable for the following components:    WBC 15.0 (*)     Neutrophils Absolute 11.3 (*)     Monocytes Absolute 1.4 (*)     All other components within normal limits    Narrative:     Performed at:  [facility],  [place], [facility]   Phone [phone]   COMPREHENSIVE METABOLIC PANEL - Abnormal; Notable for the following components:    Sodium 130 (*)     Chloride 98 (*)     CO2 20 (*)     Glucose 131 (*)     CREATININE 0.7 (*)     Albumin/Globulin Ratio 0.9 (*)     All other components within normal limits    Narrative:     Performed at:  [facility] [address],  [place], [place]   Phone [phone]   CULTURE, URINE   CULTURE, BLOOD 1   CULTURE, BLOOD 2   TROPONIN    Narrative:     Performed at:  [place] [address],  [place], [facility]   Phone [phone]   BRAIN NATRIURETIC PEPTIDE    Narrative:     Performed at:  [facility],  [place], [facility]   Phone [phone]   PROCALCITONIN    Narrative:     Performed at:  [place] [address],  [place], [facility]   Phone [phone]   LACTIC ACID, PLASMA    Narrative:     Performed at:  [place] [address],  [place], [facility]   Phone [phone]      CT CHEST PULMONARY EMBOLISM W CONTRAST   Final Result   1. No evidence of pulmonary embolism   2. Right-sided parenchymal lung disease having appearance compatible with   bronchiolitis/broncho pneumonia most likely related to a chronic atypical   infectious process, with reactive hilar adenopathy.   Disease appears worse on   the right and improved on the left compared with 2016         XR CHEST PORTABLE   Final Result   Chronic pulmonary change with subtle bibasilar infiltrates representing   atelectasis versus pneumonia. Xr Chest Portable    Result Date: 6/6/2020  EXAMINATION: ONE XRAY VIEW OF THE CHEST 6/6/2020 9:48 pm COMPARISON: Chest radiograph performed 04/14/2019. HISTORY: ORDERING SYSTEM PROVIDED HISTORY: short of breath TECHNOLOGIST PROVIDED HISTORY: Reason for exam:->short of breath Reason for Exam: SOB Acuity: Acute Type of Exam: Initial FINDINGS: There is chronic pulmonary change. There are subtle bibasilar infiltrates. There is no effusion. There is no pneumothorax. The mediastinal structures are unremarkable. The upper abdomen is unremarkable. The extrathoracic soft tissues are unremarkable. Chronic pulmonary change with subtle bibasilar infiltrates representing atelectasis versus pneumonia. PROCEDURES:   Procedures    ASSESSMENT AND PLAN:  Roberto Olivo is a 53 y.o. male presents this evening with complaint of shortness of breath worse with exertion associated with cough as well as leg swelling. On exam, patient appears to be in respiratory distress, nontoxic, hypoxic requiring 4 L of oxygen to be above 93%. He does not wear oxygen at home. He also has some leg swelling as well as wheezing. Labs with leukocytosis of 15 otherwise the remaining labs unremarkable including troponins, proBNP, procalcitonin. X-ray concerning for atelectasis versus pneumonia. At this time I think his symptoms are probably secondary to pneumonia and he will be treated with antibiotics as inpatient given the fact that he is failed outpatient management. I did decide to get a CT scan because of his persistent pneumonia or symptoms to evaluate for possible malignancy. Symptoms could also be COPD, and thus he was given steroids and breathing treatments. Symptoms could also be secondary to a PE given the fact that patient is tachycardic and requiring 4 5 L of oxygen.  Nonetheless, patient needs to be admitted for inpatient treatment because he is failed

## 2020-06-07 NOTE — H&P
dose   Yes Historical Provider, MD   escitalopram (LEXAPRO) 10 MG tablet Take 10 mg by mouth nightly    Historical Provider, MD   [id number] (RESPERAL PO) Take 2 mg by mouth nightly    Historical Provider, MD       Social History     Socioeconomic History    Marital status:      Spouse name: None    Number of children: None    Years of education: None    Highest education level: None   Occupational History    None   Social Needs    Financial resource strain: None    Food insecurity     Worry: None     Inability: None    Transportation needs     Medical: None     Non-medical: None   Tobacco Use    Smoking status: Former Smoker     Packs/day: 1.00     Years: 20.00     Pack years: 20.00     Types: Cigarettes     Last attempt to quit: 2013     Years since quittin.5    Smokeless tobacco: Never Used    Tobacco comment: Pt states he hasn't smoked in 6 days   Substance and Sexual Activity    Alcohol use: No     Comment: no alcohol for 2 years    Drug use: No    Sexual activity: Not Currently     Partners: Female   Lifestyle    Physical activity     Days per week: None     Minutes per session: None    Stress: None   Relationships    Social connections     Talks on phone: None     Gets together: None     Attends [employer] service: None     Active member of club or organization: None     Attends meetings of clubs or organizations: None     Relationship status: None    Intimate partner violence     Fear of current or ex partner: None     Emotionally abused: None     Physically abused: None     Forced sexual activity: None   Other Topics Concern    None   Social History Narrative    None       Family History   Problem Relation Age of Onset    Heart Disease Father     Dementia Mother     Asthma Neg Hx     Cancer Neg Hx     Diabetes Neg Hx     Heart Failure Neg Hx     Hypertension Neg Hx     Emphysema Neg Hx        REVIEW OF SYSTEMS:   Constitutional: Negative for fever HEENT: Negative for sore throat   Eyes: Negative for redness   Respiratory: + for dyspnea, cough   Cardiovascular: Negative for chest pain   Gastrointestinal: Negative for vomiting, diarrhea   Genitourinary: Negative for hematuria   Musculoskeletal: Negative for arthralgias   Skin: Negative for rash   Neurological: Negative for syncope   Hematological: Negative for adenopathy   Psychiatric/Behavorial: Negative for anxiety      On Physical Examination    Vitals:    06/07/20 0846   BP: 135/76   Pulse: 81   Resp: 19   Temp: 98 °F (36.7 °C)   SpO2: 96%       Gen: No distress. Alert. Awake and well-oriented. Morbidly obese  Eyes: PERRL. No sclera icterus. No conjunctival injection. ENT: No discharge. Pharynx clear. Neck: Trachea midline. Normal thyroid. Resp: No accessory muscle use. Diminished breath sounds,  no crackles. No wheezes. No rhonchi. No dullness on percussion. CV: Regular rate. Regular rhythm. No murmur or rub. Trace edema. GI: Non-tender. Non-distended. No masses. No organomegaly. Normal bowel sounds. No hernia. Skin: Warm and dry. No nodule on exposed extremities. No rash on exposed extremities. Lymph: No cervical LAD. No supraclavicular LAD. M/S: No cyanosis. No joint deformity. No clubbing. Intact peripheral pulses. Brisk cap refill, < 2 secs  Neuro: Awake. Reflexes 2+ symmetric bilaterally   Psych: Oriented x 3. No anxiety or agitation. CBC:   Recent Labs     06/06/20 2115   WBC 15.0*   HGB 14.3   HCT 43.7   MCV 89.6        BMP:   Recent Labs     06/06/20 2115   *   K 5.0   CL 98*   CO2 20*   BUN 13   CREATININE 0.7*     LIVER PROFILE:   Recent Labs     06/06/20 2115   AST 27   ALT 29   BILITOT 0.3   ALKPHOS 76     PT/INR: No results for input(s): PROTIME, INR in the last 72 hours. APTT: No results for input(s): APTT in the last 72 hours.   UA:No results for input(s): NITRITE, COLORU, PHUR, LABCAST, WBCUA, RBCUA, MUCUS, TRICHOMONAS, YEAST, BACTERIA, CLARITYU,

## 2020-06-07 NOTE — PROGRESS NOTES
RESPIRATORY THERAPY ASSESSMENT    Name:  Lizzy Cox [address] Record Number:  [id number]  Age: 53 y.o. Gender: male  : 1970  Today's Date:  2020  Room:  /0319-01    Assessment     Is the patient being admitted for a COPD or Asthma exacerbation? No   (If yes the patient will be seen every 4 hours for the first 24 hours and then reassessed)    Patient Admission Diagnosis      Allergies  No Known Allergies    Minimum Predicted Vital Capacity:     n/a          Actual Vital Capacity:      n/a              Pulmonary History:COPD  Home Oxygen Therapy:  room air  Home Respiratory Therapy:Albuterol BID  Current Respiratory Therapy:  Albuterol QID          Respiratory Severity Index(RSI)   Patients with orders for inhalation medications, oxygen, or any therapeutic treatment modality will be placed on Respiratory Protocol. They will be assessed with the first treatment and at least every 72 hours thereafter. The following severity scale will be used to determine frequency of treatment intervention.     Smoking History: Pulmonary Disease or Smoking History, Greater than 15 pack year = 2    Social History  Social History     Tobacco Use    Smoking status: Former Smoker     Packs/day: 1.00     Years: 20.00     Pack years: 20.00     Types: Cigarettes     Last attempt to quit: 2013     Years since quittin.5    Smokeless tobacco: Never Used    Tobacco comment: Pt states he hasn't smoked in 6 days   Substance Use Topics    Alcohol use: No     Comment: no alcohol for 2 years    Drug use: No       Recent Surgical History: None = 0  Past Surgical History  Past Surgical History:   Procedure Laterality Date    CHOLECYSTECTOMY         Level of Consciousness: Alert, Oriented, and Cooperative = 0    Level of Activity: Walking unassisted = 0    Respiratory Pattern: Dyspnea with exertion;Irregular pattern;or RR less than 6 = 2    Breath Sounds: Diminshed bilaterally and/or crackles = 2    Sputum   ,  , b. Patients treated with HHN at Home        c. Unable to demonstrate proper use of MDI with spacer     d. RR > 30 bpm   5. Bronchodilators will be delivered via Metered Dose Inhaler (MDI), HHN, Aerogen to intubated patients on mechanical ventilation. 6. Inhalation medication orders will be delivered and/or substituted as outlined below. Aerosolized Medications Ordering and Administration Guidelines:    1. All Medications will be ordered by a physician, and their frequency and/or modality will be adjusted as defined by the patients Respiratory Severity Index (RSI) score. 2. If the patient does not have documented COPD, consider discontinuing anticholinergics when RSI is less than 9.  3. If the bronchospasm worsens (increased RSI), then the bronchodilator frequency can be increased to a maximum of every 4 hours. If greater than every 4 hours is required, the physician will be contacted. 4. If the bronchospasm improves, the frequency of the bronchodilator can be decreased, based on the patient's RSI, but not less than home treatment regimen frequency. 5. Bronchodilator(s) will be discontinued if patient has a RSI less than 9 and has received no scheduled or as needed treatment for 72  Hrs. Patients Ordered on a Mucolytic Agent:    1. Must always be administered with a bronchodilator. 2. Discontinue if patient experiences worsened bronchospasm, or secretions have lessened to the point that the patient is able to clear them with a cough. Anti-inflammatory and Combination Medications:    1. If the patient lacks prior history of lung disease, is not using inhaled anti-inflammatory medication at home, and lacks wheezing by examination or by history for at least 24 hours, contact physician for possible discontinuation.

## 2020-06-07 NOTE — PROGRESS NOTES
[facility] called, patient in [facility]. Went into patient's room, patient stable, sitting up to side of bed talking on the phone. Patient was bouncing his leg. As soon as he stopped, his rhythm resolved to sinus tach. Patient educated on telemetry interference. Will continue to monitor.

## 2020-06-08 LAB
ANION GAP SERPL CALCULATED.3IONS-SCNC: 11 MMOL/L (ref 3–16)
BASOPHILS ABSOLUTE: 0.1 K/UL (ref 0–0.2)
BASOPHILS RELATIVE PERCENT: 0.6 %
BUN BLDV-MCNC: 14 MG/DL (ref 7–20)
CALCIUM SERPL-MCNC: 8.7 MG/DL (ref 8.3–10.6)
CHLORIDE BLD-SCNC: 93 MMOL/L (ref 99–110)
CO2: 26 MMOL/L (ref 21–32)
CREAT SERPL-MCNC: 0.7 MG/DL (ref 0.9–1.3)
EOSINOPHILS ABSOLUTE: 0 K/UL (ref 0–0.6)
EOSINOPHILS RELATIVE PERCENT: 0.3 %
GFR AFRICAN AMERICAN: >60
GFR NON-AFRICAN AMERICAN: >60
GLUCOSE BLD-MCNC: 122 MG/DL (ref 70–99)
HCT VFR BLD CALC: 41.2 % (ref 40.5–52.5)
HEMOGLOBIN: 13.4 G/DL (ref 13.5–17.5)
LYMPHOCYTES ABSOLUTE: 2.7 K/UL (ref 1–5.1)
LYMPHOCYTES RELATIVE PERCENT: 19.4 %
MCH RBC QN AUTO: 29.2 PG (ref 26–34)
MCHC RBC AUTO-ENTMCNC: 32.4 G/DL (ref 31–36)
MCV RBC AUTO: 90.1 FL (ref 80–100)
MONOCYTES ABSOLUTE: 1.1 K/UL (ref 0–1.3)
MONOCYTES RELATIVE PERCENT: 7.9 %
NEUTROPHILS ABSOLUTE: 10 K/UL (ref 1.7–7.7)
NEUTROPHILS RELATIVE PERCENT: 71.8 %
PDW BLD-RTO: 14.3 % (ref 12.4–15.4)
PLATELET # BLD: 301 K/UL (ref 135–450)
PMV BLD AUTO: 8 FL (ref 5–10.5)
POTASSIUM REFLEX MAGNESIUM: 3.7 MMOL/L (ref 3.5–5.1)
RBC # BLD: 4.57 M/UL (ref 4.2–5.9)
SODIUM BLD-SCNC: 130 MMOL/L (ref 136–145)
URINE CULTURE, ROUTINE: NORMAL
WBC # BLD: 13.9 K/UL (ref 4–11)

## 2020-06-08 PROCEDURE — 80048 BASIC METABOLIC PNL TOTAL CA: CPT

## 2020-06-08 PROCEDURE — 94761 N-INVAS EAR/PLS OXIMETRY MLT: CPT

## 2020-06-08 PROCEDURE — 6370000000 HC RX 637 (ALT 250 FOR IP): Performed by: INTERNAL MEDICINE

## 2020-06-08 PROCEDURE — 99232 SBSQ HOSP IP/OBS MODERATE 35: CPT | Performed by: INTERNAL MEDICINE

## 2020-06-08 PROCEDURE — 36415 COLL VENOUS BLD VENIPUNCTURE: CPT

## 2020-06-08 PROCEDURE — 1200000000 HC SEMI PRIVATE

## 2020-06-08 PROCEDURE — 6360000002 HC RX W HCPCS: Performed by: HOSPITALIST

## 2020-06-08 PROCEDURE — 6370000000 HC RX 637 (ALT 250 FOR IP): Performed by: HOSPITALIST

## 2020-06-08 PROCEDURE — 94640 AIRWAY INHALATION TREATMENT: CPT

## 2020-06-08 PROCEDURE — 6360000002 HC RX W HCPCS: Performed by: INTERNAL MEDICINE

## 2020-06-08 PROCEDURE — 2580000003 HC RX 258: Performed by: HOSPITALIST

## 2020-06-08 PROCEDURE — 85025 COMPLETE CBC W/AUTO DIFF WBC: CPT

## 2020-06-08 RX ORDER — SODIUM CHLORIDE 9 MG/ML
INJECTION, SOLUTION INTRAVENOUS
Status: DISPENSED
Start: 2020-06-08 | End: 2020-06-08

## 2020-06-08 RX ORDER — LEVOFLOXACIN 5 MG/ML
750 INJECTION, SOLUTION INTRAVENOUS EVERY 24 HOURS
Status: DISCONTINUED | OUTPATIENT
Start: 2020-06-08 | End: 2020-06-09 | Stop reason: HOSPADM

## 2020-06-08 RX ADMIN — LEVOFLOXACIN 750 MG: 5 INJECTION, SOLUTION INTRAVENOUS at 10:16

## 2020-06-08 RX ADMIN — Medication 10 ML: at 10:16

## 2020-06-08 RX ADMIN — Medication 2 PUFF: at 20:00

## 2020-06-08 RX ADMIN — Medication 1.5 G: at 04:21

## 2020-06-08 RX ADMIN — ENOXAPARIN SODIUM 40 MG: 40 INJECTION SUBCUTANEOUS at 10:16

## 2020-06-08 RX ADMIN — ESCITALOPRAM OXALATE 10 MG: 10 TABLET ORAL at 21:55

## 2020-06-08 RX ADMIN — ATORVASTATIN CALCIUM 10 MG: 10 TABLET, FILM COATED ORAL at 21:54

## 2020-06-08 RX ADMIN — Medication 10 ML: at 21:55

## 2020-06-08 RX ADMIN — Medication 2 PUFF: at 07:12

## 2020-06-08 RX ADMIN — PREDNISONE 40 MG: 20 TABLET ORAL at 10:16

## 2020-06-08 NOTE — PROGRESS NOTES
peripheral pulses. Brisk cap refill, < 2 secs  Neuro: Awake. Reflexes 2+ symmetric bilaterally   Psych: Oriented x 3. No anxiety or agitation. Lab Data:  CBC:   Recent Labs     06/06/20 2115 06/08/20  0553   WBC 15.0* 13.9*   RBC 4.87 4.57   HGB 14.3 13.4*   HCT 43.7 41.2   MCV 89.6 90.1   RDW 14.7 14.3    301     BMP:   Recent Labs     06/06/20 2115 06/08/20  0553   * 130*   K 5.0 3.7   CL 98* 93*   CO2 20* 26   BUN 13 14   CREATININE 0.7* 0.7*     BNP: No results for input(s): BNP in the last 72 hours. PT/INR: No results for input(s): PROTIME, INR in the last 72 hours. APTT:No results for input(s): APTT in the last 72 hours. CARDIAC ENZYMES:   Recent Labs     06/06/20 2115 06/07/20  1119 06/07/20  1558   TROPONINI <0.01 <0.01 <0.01     FASTING LIPID PANEL:  Lab Results   Component Value Date    CHOL 247 08/08/2015    HDL 32 08/08/2015    HDL 28 04/30/2010    TRIG 111 08/08/2015     LIVER PROFILE:   Recent Labs     06/06/20 2115   AST 27   ALT 29   BILITOT 0.3   ALKPHOS 76       Radiology  CT CHEST PULMONARY EMBOLISM W CONTRAST   Final Result   1. No evidence of pulmonary embolism   2. Right-sided parenchymal lung disease having appearance compatible with   bronchiolitis/broncho pneumonia most likely related to a chronic atypical   infectious process, with reactive hilar adenopathy. Disease appears worse on   the right and improved on the left compared with 2016           XR CHEST PORTABLE   Final Result   Chronic pulmonary change with subtle bibasilar infiltrates representing   atelectasis versus pneumonia.          Assessment & Plan:     Patient Active Problem List    Diagnosis Date Noted    COPD exacerbation ([facility] [address] 06/07/2020    IFG (impaired fasting glucose) 10/20/2017    Bipolar 1 disorder, manic, mild ([facility] [address] 11/08/2016    Gastroesophageal reflux disease without esophagitis 06/03/2016    HTN (hypertension) 08/07/2015    Hyperlipidemia 08/07/2015    Dizziness 02/08/2014    Multiple pulmonary nodules 11/12/2013    Dyspnea 11/12/2013    MDD (major depressive disorder) 10/28/2013    IBS (irritable bowel syndrome) 03/21/2013    Chronic abdominal pain 03/21/2013    Migraine syndrome 03/21/2013     Acute hypoxic respiratory failure  -Likely secondary to pneumonia and COPD exacerbation   -patient presenting with cough and dyspnea on exertion  -Hypoxic in the ED required supplemental oxygen 4 L.  -Continue on 3 L of oxygen continue supplemental oxygen to maintain sats over 92%  -CT chest ruled out pulmonary embolism did show evidence of airspace disease  -Due to presenting symptoms,  need to rule out COVID-19. Testing sent out,  keep on droplet plus precautions until COVID-19 resulted   BNP normal troponin negative. Now on RA      Pneumonia  community-acquired pneumonia versus atypical pna.  - suspect strep pneumonia or other gram positive organism  - Failed outpatient management . Recently completed course of oral antibiotics amoxicillin  -We will place on IV antibiotics vancomycin and cefepime, narrow  Abx to levaquin    Exacerbation of COPD  - Patient not on home oxygen, currently requiring supplemental oxygen  - he quit smoking tobacco more than 3 years ago   - continue inhaled bronchodilators and prednisone. Use albuterol MDI only until COVID-19 resulted     Leukocytosis   -secondary to pneumonia, monitor white count    Hyponatremia  ? SIADH   1500 ml fluid restriction  Will follow      Morbid Obesity  - BMI 17.1  - Complicating assessment and treatment. Placing patient at risk for multiple co-morbidities as well as early death and contributing to the patient's presentation.   - Counseled on weight loss.     H/O Anxiety and bipolar disorder  -On Lexapro     Hypertension  -History of hypertension per chart. Not on any home medications.   Monitor blood pressures, and add antihypertensives if needed     Hyperlipidemia   - on statins     H/O  Irritable bowel syndrome     - Lovenox for DVT

## 2020-06-08 NOTE — PROGRESS NOTES
Shift assessment complete. Pt is alert and oriented. Pt noted to be hypertensive. Respirations are even and easy. SpO2 is 92% on room air. No complaints voiced. Pt denies needs at this time. SR up x 2 and bed in low position. Call light is within reach. Will continue to monitor.

## 2020-06-09 VITALS
SYSTOLIC BLOOD PRESSURE: 134 MMHG | OXYGEN SATURATION: 91 % | HEART RATE: 90 BPM | RESPIRATION RATE: 20 BRPM | TEMPERATURE: 97.2 F | BODY MASS INDEX: 45.1 KG/M2 | HEIGHT: 70 IN | WEIGHT: 315 LBS | DIASTOLIC BLOOD PRESSURE: 82 MMHG

## 2020-06-09 LAB
ANION GAP SERPL CALCULATED.3IONS-SCNC: 11 MMOL/L (ref 3–16)
BUN BLDV-MCNC: 16 MG/DL (ref 7–20)
CALCIUM SERPL-MCNC: 8.5 MG/DL (ref 8.3–10.6)
CHLORIDE BLD-SCNC: 100 MMOL/L (ref 99–110)
CO2: 30 MMOL/L (ref 21–32)
CREAT SERPL-MCNC: 0.8 MG/DL (ref 0.9–1.3)
GFR AFRICAN AMERICAN: >60
GFR NON-AFRICAN AMERICAN: >60
GLUCOSE BLD-MCNC: 92 MG/DL (ref 70–99)
HCT VFR BLD CALC: 42.3 % (ref 40.5–52.5)
HEMOGLOBIN: 13.7 G/DL (ref 13.5–17.5)
MCH RBC QN AUTO: 28.6 PG (ref 26–34)
MCHC RBC AUTO-ENTMCNC: 32.3 G/DL (ref 31–36)
MCV RBC AUTO: 88.3 FL (ref 80–100)
PDW BLD-RTO: 14.5 % (ref 12.4–15.4)
PLATELET # BLD: 341 K/UL (ref 135–450)
PMV BLD AUTO: 7.9 FL (ref 5–10.5)
POTASSIUM SERPL-SCNC: 4.3 MMOL/L (ref 3.5–5.1)
RBC # BLD: 4.8 M/UL (ref 4.2–5.9)
SARS-COV-2: NOT DETECTED
SODIUM BLD-SCNC: 141 MMOL/L (ref 136–145)
SOURCE: NORMAL
WBC # BLD: 12.4 K/UL (ref 4–11)

## 2020-06-09 PROCEDURE — 6370000000 HC RX 637 (ALT 250 FOR IP): Performed by: HOSPITALIST

## 2020-06-09 PROCEDURE — 85027 COMPLETE CBC AUTOMATED: CPT

## 2020-06-09 PROCEDURE — 94761 N-INVAS EAR/PLS OXIMETRY MLT: CPT

## 2020-06-09 PROCEDURE — 2580000003 HC RX 258: Performed by: HOSPITALIST

## 2020-06-09 PROCEDURE — 94640 AIRWAY INHALATION TREATMENT: CPT

## 2020-06-09 PROCEDURE — 36415 COLL VENOUS BLD VENIPUNCTURE: CPT

## 2020-06-09 PROCEDURE — 87070 CULTURE OTHR SPECIMN AEROBIC: CPT

## 2020-06-09 PROCEDURE — 6360000002 HC RX W HCPCS: Performed by: INTERNAL MEDICINE

## 2020-06-09 PROCEDURE — 80048 BASIC METABOLIC PNL TOTAL CA: CPT

## 2020-06-09 PROCEDURE — 99239 HOSP IP/OBS DSCHRG MGMT >30: CPT | Performed by: INTERNAL MEDICINE

## 2020-06-09 PROCEDURE — 87205 SMEAR GRAM STAIN: CPT

## 2020-06-09 PROCEDURE — 6360000002 HC RX W HCPCS: Performed by: HOSPITALIST

## 2020-06-09 RX ORDER — LEVOFLOXACIN 750 MG/1
750 TABLET ORAL DAILY
Qty: 6 TABLET | Refills: 0 | Status: SHIPPED | OUTPATIENT
Start: 2020-06-09 | End: 2020-06-15

## 2020-06-09 RX ORDER — PREDNISONE 20 MG/1
TABLET ORAL
Qty: 8 TABLET | Refills: 0 | Status: SHIPPED | OUTPATIENT
Start: 2020-06-09 | End: 2020-08-13

## 2020-06-09 RX ADMIN — LEVOFLOXACIN 750 MG: 5 INJECTION, SOLUTION INTRAVENOUS at 08:01

## 2020-06-09 RX ADMIN — ENOXAPARIN SODIUM 40 MG: 40 INJECTION SUBCUTANEOUS at 08:01

## 2020-06-09 RX ADMIN — Medication 10 ML: at 08:01

## 2020-06-09 RX ADMIN — Medication 2 PUFF: at 07:04

## 2020-06-09 RX ADMIN — PREDNISONE 40 MG: 20 TABLET ORAL at 08:01

## 2020-06-09 NOTE — PROGRESS NOTES
Pt is resting with eyes closed. No s/s of distress at this time. Call light and bedside table within reach. Will continue to monitor.

## 2020-06-09 NOTE — PROGRESS NOTES
Shift assessment complete. Pt is alert and oriented. Pt noted to be hypertensive. Respirations are even and easy. Pt is 96% on RA. No complaints voiced. Pt denies needs at this time. SR up x 2 and bed in low position. Call light is within reach. Will continue to monitor.

## 2020-06-09 NOTE — PROGRESS NOTES
Admit: 2020    Name:  Rafael Strickland  Room:  0201/0201-01  MRN:    [id number]    Daily Progress Note for 2020     Interval History:     Feels much better today    Scheduled Meds:   levofloxacin  750 mg Intravenous Q24H    escitalopram  10 mg Oral Nightly    sodium chloride flush  10 mL Intravenous 2 times per day    enoxaparin  40 mg Subcutaneous Daily    predniSONE  40 mg Oral Daily    albuterol sulfate HFA  2 puff Inhalation BID    atorvastatin  10 mg Oral Nightly       Continuous Infusions:      PRN Meds:  sodium chloride flush, acetaminophen **OR** acetaminophen, polyethylene glycol, promethazine **OR** ondansetron, albuterol sulfate HFA                  Objective:     Temp  Av.3 °F (36.3 °C)  Min: 97 °F (36.1 °C)  Max: 97.9 °F (36.6 °C)  Pulse  Av.2  Min: 78  Max: 91  BP  Min: 134/82  Max: 167/97  SpO2  Av.4 %  Min: 91 %  Max: 96 %  Patient Vitals for the past 4 hrs:   BP Temp Temp src Pulse Resp SpO2   20 1319 134/82 97.2 °F (36.2 °C) Oral 90 20 91 %         Intake/Output Summary (Last 24 hours) at 2020 1350  Last data filed at 2020 1421  Gross per 24 hour   Intake 470 ml   Output --   Net 470 ml       Physical Exam:  Gen: No distress. Alert. Awake and well-oriented. Morbidly obese  Eyes: PERRL. No sclera icterus. No conjunctival injection. ENT: No discharge. Pharynx clear. Neck: Trachea midline. Normal thyroid. Resp: No accessory muscle use. Diminished breath sounds,  no crackles. No wheezes. No rhonchi. No dullness on percussion. CV: Regular rate. Regular rhythm. No murmur or rub. Trace edema. GI: Non-tender. Non-distended. No masses. No organomegaly. Normal bowel sounds. No hernia. Skin: Warm and dry. No nodule on exposed extremities. No rash on exposed extremities. Lymph: No cervical LAD. No supraclavicular LAD. M/S: No cyanosis. No joint deformity. No clubbing. Intact peripheral pulses. Brisk cap refill, < 2 secs  Neuro: Awake.  Reflexes 2+

## 2020-06-09 NOTE — DISCHARGE SUMMARY
Name:  Janie Batres  Room:  0201/0201-01  MRN:    [id number]    Discharge Summary      This discharge summary is in conjunction with a complete physical exam done on the day of discharge. Attending Physician: Dr. Jen Aguero  Discharging Physician: Dr. Gonzalez Alberto: 6/6/2020  Discharge:   6/9/2020    HPI:  50-year-old male with history of anxiety, bipolar disorder, COPD not on home oxygen, quit smoking more than 3 years ago, history of hypertension, hyperlipidemia and irritable bowel syndrome presenting with complaints of cough and shortness of breath. Symptoms ongoing for 3 weeks now. Patient describes a persistent cough is been coughing up some green sputum, associated with shortness of breath. He has significant dyspnea on exertion associated with wheezing. He has failed outpatient management. He was initially treated with a course of steroids subsequently finished a course of antibiotics amoxicillin. Due to persistent symptoms especially significant dyspnea and wheezing on exertion patient came into the emergency department last night. Patient noted to be hypoxic with O2 sats in the 80s in the ED. CT chest negative for PE, did show evidence of airspace disease. Patient required supplemental oxygen 4 L in the ED  Admitted for acute hypoxic respiratory failure, pneumonia, COPD exacerbation.    Patient denies any exposure to any positive COVID-19 patients.      Patient denies any chest pain, no leg edema, no fevers or chills, no nausea or vomiting or diarrhea.       Diagnoses this Admission and Hospital Course      Acute hypoxic respiratory failure  -Likely secondary to pneumonia and COPD exacerbation   -patient presenting with cough and dyspnea on exertion  -Hypoxic in the ED required supplemental oxygen 4 L.  -Continued on 3 L of oxygen continue supplemental oxygen to maintain sats over 92%  -CT chest ruled out pulmonary embolism did show evidence of airspace disease  -Due to presenting BMP:   Recent Labs     06/06/20 2115 06/08/20  0553 06/09/20  0542   * 130* 141   K 5.0 3.7 4.3   CL 98* 93* 100   CO2 20* 26 30   BUN 13 14 16   CREATININE 0.7* 0.7* 0.8*     LIVER PROFILE:   Recent Labs     06/06/20 2115   AST 27   ALT 29   BILITOT 0.3   ALKPHOS 76       CARDIAC ENZYMES  Recent Labs     06/06/20 2115 06/07/20  1119 06/07/20  1558   TROPONINI <0.01 <0.01 <0.01       U/A:    Lab Results   Component Value Date    COLORU Yellow 04/14/2019    CLARITYU Clear 04/14/2019    SPECGRAV <=1.005 04/14/2019    LEUKOCYTESUR Negative 04/14/2019    BLOODU Negative 04/14/2019    GLUCOSEU Negative 04/14/2019       CULTURES  Sputum culture- gram stain 1 + gram positve cocci and 1 + gram neg ac - I will follow up of culture results     RADIOLOGY  CT CHEST PULMONARY EMBOLISM W CONTRAST   Final Result   1. No evidence of pulmonary embolism   2. Right-sided parenchymal lung disease having appearance compatible with   bronchiolitis/broncho pneumonia most likely related to a chronic atypical   infectious process, with reactive hilar adenopathy. Disease appears worse on   the right and improved on the left compared with 2016         XR CHEST PORTABLE   Final Result   Chronic pulmonary change with subtle bibasilar infiltrates representing   atelectasis versus pneumonia. Discharge Medications     Medication List      START taking these medications    levoFLOXacin 750 MG tablet  Commonly known as:  Levaquin  Take 1 tablet by mouth daily for 6 days     predniSONE 20 MG tablet  Commonly known as:  DELTASONE  1 1/2 qd- 2 days,1 qd- 3 days,1/2 qd- 4 days        CONTINUE taking these medications    atorvastatin 10 MG tablet  Commonly known as:  LIPITOR     Lexapro 10 MG tablet  Generic drug:  escitalopram        STOP taking these medications    RESPERAL PO           Where to Get Your Medications      These medications were sent to [facility]/pharmacy #[id number] [address] [phone] - D [phone]  [address] [place] [id number]    Phone:  [phone]   · levoFLOXacin 750 MG tablet  · predniSONE 20 MG tablet           Discharged in stable condition to home. Follow Up: Follow up with PCP. Total time spent on discharge is 35 minutes    BIJAN Dejesus.

## 2020-06-09 NOTE — CARE COORDINATION
Review of chart for any potential discharge needs. Weaned off of O2 (3 liters) and saturating 92-94% on RA since 22:00 Pm 6/8. Await C-19 result. No needs identified for discharge intervention at this time. MD and bedside RN  if needs arise please consult case management for discharge intervention. CM not following at this time.

## 2020-06-10 ENCOUNTER — CARE COORDINATION (OUTPATIENT)
Dept: CASE MANAGEMENT | Age: 50
End: 2020-06-10

## 2020-06-10 LAB — BLOOD CULTURE, ROUTINE: NORMAL

## 2020-06-11 ENCOUNTER — CARE COORDINATION (OUTPATIENT)
Dept: CASE MANAGEMENT | Age: 50
End: 2020-06-11

## 2020-06-11 LAB
CULTURE, BLOOD 2: NORMAL
CULTURE, RESPIRATORY: NORMAL
GRAM STAIN RESULT: NORMAL

## 2020-06-23 ENCOUNTER — HOSPITAL ENCOUNTER (OUTPATIENT)
Age: 50
Discharge: HOME | End: 2020-06-23
Payer: MEDICARE

## 2020-06-23 ENCOUNTER — HOSPITAL ENCOUNTER (OUTPATIENT)
Dept: GENERAL RADIOLOGY | Age: 50
Discharge: HOME | End: 2020-06-23
Payer: MEDICARE

## 2020-06-23 PROCEDURE — 71046 X-RAY EXAM CHEST 2 VIEWS: CPT

## 2020-06-30 ENCOUNTER — OFFICE VISIT (OUTPATIENT)
Dept: ORTHOPEDIC SURGERY | Age: 50
End: 2020-06-30
Payer: MEDICARE

## 2020-06-30 VITALS — BODY MASS INDEX: 46.65 KG/M2 | WEIGHT: 315 LBS | RESPIRATION RATE: 12 BRPM | HEIGHT: 69 IN

## 2020-06-30 PROBLEM — M54.50 LOW BACK PAIN: Status: ACTIVE | Noted: 2020-06-30

## 2020-06-30 PROBLEM — M16.0 BILATERAL PRIMARY OSTEOARTHRITIS OF HIP: Status: ACTIVE | Noted: 2020-06-30

## 2020-06-30 PROCEDURE — G8417 CALC BMI ABV UP PARAM F/U: HCPCS | Performed by: ORTHOPAEDIC SURGERY

## 2020-06-30 PROCEDURE — 1036F TOBACCO NON-USER: CPT | Performed by: ORTHOPAEDIC SURGERY

## 2020-06-30 PROCEDURE — G8427 DOCREV CUR MEDS BY ELIG CLIN: HCPCS | Performed by: ORTHOPAEDIC SURGERY

## 2020-06-30 PROCEDURE — 1111F DSCHRG MED/CURRENT MED MERGE: CPT | Performed by: ORTHOPAEDIC SURGERY

## 2020-06-30 PROCEDURE — 99213 OFFICE O/P EST LOW 20 MIN: CPT | Performed by: ORTHOPAEDIC SURGERY

## 2020-06-30 RX ORDER — TIZANIDINE 4 MG/1
4 TABLET ORAL DAILY
Qty: 30 TABLET | Refills: 11 | Status: SHIPPED | OUTPATIENT
Start: 2020-06-30 | End: 2020-08-13

## 2020-07-07 ENCOUNTER — TELEPHONE (OUTPATIENT)
Dept: ORTHOPEDIC SURGERY | Age: 50
End: 2020-07-07

## 2020-07-07 RX ORDER — DIAZEPAM 5 MG/1
TABLET ORAL
Qty: 1 TABLET | Refills: 0 | Status: SHIPPED | OUTPATIENT
Start: 2020-07-07 | End: 2020-07-14

## 2020-07-07 NOTE — TELEPHONE ENCOUNTER
SPOKE WITH PATIENT IN REGARDS TO MRI APPROVAL LUMBAR SPINE. INFORMED PATIENT MRI ORDER ALONG WITH MRI AUTHORIZATION WAS FAXED TO [place] [id number]. INFORMED PATIENT TO CALL AT THEIR CONVENIENCE TO SCHEDULE THAT MRI.

## 2020-07-14 ENCOUNTER — HOSPITAL ENCOUNTER (OUTPATIENT)
Dept: MRI IMAGING | Age: 50
Discharge: HOME | End: 2020-07-14
Payer: MEDICARE

## 2020-07-14 PROCEDURE — 72148 MRI LUMBAR SPINE W/O DYE: CPT

## 2020-08-13 ENCOUNTER — OFFICE VISIT (OUTPATIENT)
Dept: ORTHOPEDIC SURGERY | Age: 50
End: 2020-08-13
Payer: MEDICARE

## 2020-08-13 VITALS — HEIGHT: 69 IN | BODY MASS INDEX: 46.65 KG/M2 | WEIGHT: 315 LBS

## 2020-08-13 PROCEDURE — 1036F TOBACCO NON-USER: CPT | Performed by: PHYSICAL MEDICINE & REHABILITATION

## 2020-08-13 PROCEDURE — 99203 OFFICE O/P NEW LOW 30 MIN: CPT | Performed by: PHYSICAL MEDICINE & REHABILITATION

## 2020-08-13 PROCEDURE — G8427 DOCREV CUR MEDS BY ELIG CLIN: HCPCS | Performed by: PHYSICAL MEDICINE & REHABILITATION

## 2020-08-13 PROCEDURE — G8417 CALC BMI ABV UP PARAM F/U: HCPCS | Performed by: PHYSICAL MEDICINE & REHABILITATION

## 2020-08-13 RX ORDER — TIZANIDINE 4 MG/1
4 TABLET ORAL 2 TIMES DAILY
Qty: 60 TABLET | Refills: 0 | Status: SHIPPED | OUTPATIENT
Start: 2020-08-13

## 2020-08-13 RX ORDER — RISPERIDONE 2 MG/1
TABLET, FILM COATED ORAL
COMMUNITY
Start: 2020-07-27

## 2020-08-13 NOTE — PROGRESS NOTES
New Patient: SPINE    CHIEF COMPLAINT:    Chief Complaint   Patient presents with    Back Pain     low back & hip pain, ref from Dr Kb Lemus:                The patient is a 53 y.o. male seen in consultation by Dr. Shanel Nunes for back pain    Patient has a history of disability related to bipolar disorder, COPD, tobacco abuse, morbid obesity with BMI 48, anxiety    Reports chronic low back pain for many years. It is worse in the last year. Worse with standing or walking. Particularly bothersome aching pain in his back with transition from sit to stand. He has referred pain to his buttocks and also to his groin. He has no numbness tingling or weakness. No bowel bladder changes.     Pain Assessment  Location of Pain: Back  Severity of Pain: 5  Quality of Pain: Aching  Duration of Pain: Persistent  Frequency of Pain: Intermittent  Aggravating Factors: Standing, Walking, Other (Comment)  Limiting Behavior: Yes  Relieving Factors: Rest  Result of Injury: No  Work-Related Injury: No  Are there other pain locations you wish to document?: No]      Past Medical History:   Diagnosis Date    Anxiety     Bilateral primary osteoarthritis of hip 6/30/2020    Bipolar 1 disorder, manic, mild ([address] 11/8/2016    COPD exacerbation ([address] 6/7/2020    Gastroesophageal reflux disease without esophagitis 6/3/2016    HTN (hypertension) 8/7/2015    Hyperlipidemia     IBS (irritable bowel syndrome) 3/21/2013    Low back pain 6/30/2020    MDD (major depressive disorder) 10/28/2013    Migraine syndrome 3/21/2013    Morbid obesity (HCC)     Pneumonia     Tobacco abuse           Pain Assessment  Location of Pain: Back  Severity of Pain: 5  Quality of Pain: Aching  Duration of Pain: Persistent  Frequency of Pain: Intermittent  Aggravating Factors: Standing, Walking, Other (Comment)  Limiting Behavior: Yes  Relieving Factors: Rest  Result of Injury: No  Work-Related Injury: No  Are there other pain locations you wish to document?: No    The pain assessment was noted & reviewed in the medical record today. Current/Past Treatment:   · Physical Therapy:   · Chiropractic:     · Injection:     Medications:            NSAIDS:             Muscle relaxer:              Steriods:              Neuropathic medications:              Opioids:            Other:   · Surgery/Consult:    Work Status/Functionality:     Past Medical History: Medical history form was reviewed today & scanned into the media tab  Past Medical History:   Diagnosis Date    Anxiety     Bilateral primary osteoarthritis of hip 6/30/2020    Bipolar 1 disorder, manic, mild ([address] 11/8/2016    COPD exacerbation ([address] 6/7/2020    Gastroesophageal reflux disease without esophagitis 6/3/2016    HTN (hypertension) 8/7/2015    Hyperlipidemia     IBS (irritable bowel syndrome) 3/21/2013    Low back pain 6/30/2020    MDD (major depressive disorder) 10/28/2013    Migraine syndrome 3/21/2013    Morbid obesity ([facility] [address]     Pneumonia     Tobacco abuse       Past Surgical History:     Past Surgical History:   Procedure Laterality Date    CHOLECYSTECTOMY       Current Medications:     Current Outpatient Medications:     atorvastatin (LIPITOR) 10 MG tablet, Take by mouth nightly Unknown dose, Disp: , Rfl:     risperiDONE (RISPERDAL) 2 MG tablet, TAKE 1 TABLET BY MOUTH EVERYDAY AT BEDTIME, Disp: , Rfl:     escitalopram (LEXAPRO) 10 MG tablet, Take 10 mg by mouth nightly, Disp: , Rfl:   Allergies:  Patient has no known allergies. Social History:    reports that he quit smoking about 6 years ago. His smoking use included cigarettes. He has a 20.00 pack-year smoking history. He has never used smokeless tobacco. He reports that he does not drink alcohol or use drugs.   Family History:   Family History   Problem Relation Age of Onset    Heart Disease Father     Dementia Mother     Asthma Neg Hx     Cancer Neg Hx     Diabetes Neg Hx     Heart Failure Neg Hx     Hypertension Neg Hx     Emphysema Neg Hx        REVIEW OF SYSTEMS: Full ROS noted & scanned   CONSTITUTIONAL: Denies unexplained weight loss, fevers, chills or fatigue  NEUROLOGICAL: Denies unsteady gait or progressive weakness  MUSCULOSKELETAL: Denies joint swelling or redness  PSYCHOLOGICAL: Denies anxiety, depression   SKIN: Denies skin changes, delayed healing, rash, itching   HEMATOLOGIC: Denies easy bleeding or bruising  ENDOCRINE: Denies excessive thirst, urination, heat/cold  RESPIRATORY: Denies current dyspnea, cough  GI: Denies nausea, vomiting, diarrhea   : Denies bowel or bladder issues       PHYSICAL EXAM:    Vitals: Height 5' 9\" (1.753 m), weight (!) 326 lb (147.9 kg). GENERAL EXAM:  · General Apparence: He is morbidly obese  · Orientation: The patient is oriented to time, place and person. · Mood & Affect:The patient's mood and affect are appropriate   · Vascular: Examination reveals no swelling tenderness in upper or lower extremities. Good capillary refill  · Lymphatic: The lymphatic examination bilaterally reveals all areas to be without enlargement or induration  · Sensation: Sensation is intact without deficit  · Coordination/Balance: Good coordination     LUMBAR/SACRAL EXAMINATION:  · Inspection: Local inspection shows no step-off or bruising. Lumbar alignment is normal.  Sagittal and Coronal balance is neutral.      · Palpation:   No evidence of tenderness at the midline. No tenderness bilaterally at the paraspinal or trochanters. There is no step-off or paraspinal spasm. · Range of Motion: He is morbidly obese  · Strength:   Strength testing is 5/5 in all muscle groups tested. · Special Tests:   Straight leg raise and crossed SLR negative. Leg length and pelvis level.  0 out of 5 Joellen's signs. · Skin: There are no rashes, ulcerations or lesions. · Reflexes: Reflexes are symmetrically 2+ at the patellar and ankle tendons.   Clonus absent bilaterally

## 2022-05-30 ENCOUNTER — HOSPITAL ENCOUNTER (EMERGENCY)
Age: 52
Discharge: HOME | End: 2022-05-30
Payer: MEDICARE

## 2022-05-30 VITALS
BODY MASS INDEX: 46.65 KG/M2 | DIASTOLIC BLOOD PRESSURE: 97 MMHG | WEIGHT: 315 LBS | HEART RATE: 98 BPM | TEMPERATURE: 98 F | OXYGEN SATURATION: 93 % | HEIGHT: 69 IN | RESPIRATION RATE: 16 BRPM | SYSTOLIC BLOOD PRESSURE: 152 MMHG

## 2022-05-30 DIAGNOSIS — M54.50 ACUTE EXACERBATION OF CHRONIC LOW BACK PAIN: Primary | ICD-10-CM

## 2022-05-30 DIAGNOSIS — G89.29 ACUTE EXACERBATION OF CHRONIC LOW BACK PAIN: Primary | ICD-10-CM

## 2022-05-30 PROCEDURE — 96372 THER/PROPH/DIAG INJ SC/IM: CPT

## 2022-05-30 PROCEDURE — 6360000002 HC RX W HCPCS: Performed by: NURSE PRACTITIONER

## 2022-05-30 PROCEDURE — 99284 EMERGENCY DEPT VISIT MOD MDM: CPT

## 2022-05-30 RX ORDER — NAPROXEN 250 MG/1
250 TABLET ORAL 2 TIMES DAILY WITH MEALS
Qty: 60 TABLET | Refills: 0 | Status: SHIPPED | OUTPATIENT
Start: 2022-05-30

## 2022-05-30 RX ORDER — KETOROLAC TROMETHAMINE 30 MG/ML
15 INJECTION, SOLUTION INTRAMUSCULAR; INTRAVENOUS ONCE
Status: COMPLETED | OUTPATIENT
Start: 2022-05-30 | End: 2022-05-30

## 2022-05-30 RX ORDER — METHYLPREDNISOLONE SODIUM SUCCINATE 125 MG/2ML
125 INJECTION, POWDER, LYOPHILIZED, FOR SOLUTION INTRAMUSCULAR; INTRAVENOUS ONCE
Status: COMPLETED | OUTPATIENT
Start: 2022-05-30 | End: 2022-05-30

## 2022-05-30 RX ORDER — METHYLPREDNISOLONE 4 MG/1
TABLET ORAL
Qty: 1 KIT | Refills: 0 | Status: SHIPPED | OUTPATIENT
Start: 2022-05-30 | End: 2022-06-05

## 2022-05-30 RX ADMIN — METHYLPREDNISOLONE SODIUM SUCCINATE 125 MG: 125 INJECTION, POWDER, FOR SOLUTION INTRAMUSCULAR; INTRAVENOUS at 18:17

## 2022-05-30 RX ADMIN — KETOROLAC TROMETHAMINE 15 MG: 30 INJECTION, SOLUTION INTRAMUSCULAR; INTRAVENOUS at 18:18

## 2022-05-30 ASSESSMENT — ENCOUNTER SYMPTOMS
NAUSEA: 0
ABDOMINAL PAIN: 0
BLOOD IN STOOL: 0
COUGH: 0
SHORTNESS OF BREATH: 0
EYE PAIN: 0
BACK PAIN: 1
VOMITING: 0
RHINORRHEA: 0
SORE THROAT: 0
DIARRHEA: 0

## 2022-05-30 ASSESSMENT — PAIN DESCRIPTION - LOCATION: LOCATION: BACK

## 2022-05-30 ASSESSMENT — PAIN - FUNCTIONAL ASSESSMENT: PAIN_FUNCTIONAL_ASSESSMENT: 0-10

## 2022-05-30 ASSESSMENT — PAIN SCALES - GENERAL: PAINLEVEL_OUTOF10: 7

## 2022-05-30 ASSESSMENT — PAIN DESCRIPTION - ORIENTATION: ORIENTATION: MID;LOWER;LEFT;RIGHT

## 2022-05-31 NOTE — ED PROVIDER NOTES
[address] ED  EMERGENCY DEPARTMENT ENCOUNTER        Pt Name: Salvatore Villanueva  MRN: [id number]  [place] 1970  Date of evaluation: 5/30/2022  Provider: ANJEL Beck CNP  PCP: Juliette Yoo PA-C  Note Started: 9:18 PM EDT      DORIS. I have evaluated this patient. My supervising physician was available for consultation. Triage CHIEF COMPLAINT       Chief Complaint   Patient presents with    Back Pain     lower back pain, denies injury. \" it feels better to sit in my car. \"          HISTORY OF PRESENT ILLNESS   (Location/Symptom, Timing/Onset, Context/Setting, Quality, Duration, Modifying Factors, Severity)  Note limiting factors. Chief Complaint: Low back pain    Salvatore Villanueva is a 47 y.o. male who presents to the emerge from symptoms of low back pain. Patient reported symptoms of low back pain began approximately 1 to 2 weeks ago. He states that he has not take anything for his discomfort. He states that pain is worse with range of motion of prolonged sitting or standing. He states that he has pain worse with range of motion. No radiating pain. States that pain does not radiate to his legs. Denies any numbness tingling in his lower extremities. No weakness. Denies fever or unexplained weight loss. No recent trauma. He has no other acute complaints and states otherwise feeling well. Nursing Notes were all reviewed and agreed with or any disagreements were addressed in the HPI. REVIEW OF SYSTEMS    (2-9 systems for level 4, 10 or more for level 5)     Review of Systems   Constitutional: Negative for chills, diaphoresis and fever. HENT: Negative for congestion, ear pain, rhinorrhea and sore throat. Eyes: Negative for pain and visual disturbance. Respiratory: Negative for cough and shortness of breath. Cardiovascular: Negative for chest pain and leg swelling.    Gastrointestinal: Negative for abdominal pain, blood in stool, diarrhea, nausea and vomiting. Genitourinary: Negative for difficulty urinating, dysuria, flank pain and frequency. Musculoskeletal: Positive for back pain. Negative for neck pain. Skin: Negative for rash and wound. Neurological: Negative for dizziness and light-headedness. PAST MEDICAL HISTORY     Past Medical History:   Diagnosis Date    Anxiety     Bilateral primary osteoarthritis of hip 6/30/2020    Bipolar 1 disorder, manic, mild ([address] 11/8/2016    COPD exacerbation [facility] [facility] 6/7/2020    Gastroesophageal reflux disease without esophagitis 6/3/2016    HTN (hypertension) 8/7/2015    Hyperlipidemia     IBS (irritable bowel syndrome) 3/21/2013    Low back pain 6/30/2020    MDD (major depressive disorder) 10/28/2013    Migraine syndrome 3/21/2013    Morbid obesity ([facility] [address]     Pneumonia     Tobacco abuse        SURGICAL HISTORY     Past Surgical History:   Procedure Laterality Date    CHOLECYSTECTOMY         CURRENTMEDICATIONS       Discharge Medication List as of 5/30/2022  6:26 PM      CONTINUE these medications which have NOT CHANGED    Details   risperiDONE (RISPERDAL) 2 MG tablet TAKE 1 TABLET BY MOUTH EVERYDAY AT BEDTIMEHistorical Med      tiZANidine (ZANAFLEX) 4 MG tablet Take 1 tablet by mouth 2 times daily, Disp-60 tablet, R-0Normal      atorvastatin (LIPITOR) 10 MG tablet Take by mouth nightly Unknown doseHistorical Med      escitalopram (LEXAPRO) 10 MG tablet Take 10 mg by mouth nightlyHistorical Med             ALLERGIES     Patient has no known allergies.     FAMILYHISTORY       Family History   Problem Relation Age of Onset    Heart Disease Father     Dementia Mother     Asthma Neg Hx     Cancer Neg Hx     Diabetes Neg Hx     Heart Failure Neg Hx     Hypertension Neg Hx     Emphysema Neg Hx         SOCIAL HISTORY       Social History     Socioeconomic History    Marital status:      Spouse name: None    Number of children: None    Years of education: None    Highest education level: None   Occupational History    None   Tobacco Use    Smoking status: Former Smoker     Packs/day: 1.00     Years: 20.00     Pack years: 20.00     Types: Cigarettes     Quit date: 2013     Years since quittin.5    Smokeless tobacco: Never Used    Tobacco comment: Pt states he hasn't smoked in 6 days   Substance and Sexual Activity    Alcohol use: No     Comment: no alcohol for 2 years    Drug use: No    Sexual activity: Not Currently     Partners: Female   Other Topics Concern    None   Social History Narrative    None     Social Determinants of Health     Financial Resource Strain:     Difficulty of Paying Living Expenses: Not on file   Food Insecurity:     Worried About Running Out of Food in the Last Year: Not on file    [place] of Food in the Last Year: Not on file   Transportation Needs:     Lack of Transportation (Medical): Not on file    Lack of Transportation (Non-Medical):  Not on file   Physical Activity:     Days of Exercise per Week: Not on file    Minutes of Exercise per Session: Not on file   Stress:     Feeling of Stress : Not on file   Social Connections:     Frequency of Communication with Friends and Family: Not on file    Frequency of Social Gatherings with Friends and Family: Not on file    Attends Islam Services: Not on file    Active Member of [address] [place] [address] [employer] or Organizations: Not on file    Attends Club or Organization Meetings: Not on file    Marital Status: Not on file   Intimate Partner Violence:     Fear of Current or Ex-Partner: Not on file    Emotionally Abused: Not on file    Physically Abused: Not on file    Sexually Abused: Not on file   Housing Stability:     Unable to Pay for Housing in the Last Year: Not on file    Number of [place] in the Last Year: Not on file    Unstable Housing in the Last Year: Not on file       SCREENINGS    [place] Coma Scale  Eye Opening: Spontaneous  Best Verbal Response: Oriented  Best Motor Response: Obeys commands  Michael Coma Scale Score: 15        PHYSICAL EXAM    (up to 7 for level 4, 8 or more for level 5)     ED Triage Vitals [05/30/22 1748]   BP Temp Temp Source Heart Rate Resp SpO2 Height Weight   (!) 152/97 98 °F (36.7 °C) Oral 98 16 93 % 5' 9\" (1.753 m) (!) 350 lb (158.8 kg)       Physical Exam  Vitals and nursing note reviewed. Constitutional:       Appearance: Normal appearance. He is not toxic-appearing or diaphoretic. HENT:      Head: Normocephalic and atraumatic. Nose: Nose normal.   Eyes:      General:         Right eye: No discharge. Left eye: No discharge. Cardiovascular:      Rate and Rhythm: Normal rate and regular rhythm. Pulses: Normal pulses. Heart sounds: No murmur heard. Pulmonary:      Effort: Pulmonary effort is normal. No respiratory distress. Breath sounds: No wheezing or rhonchi. Abdominal:      Palpations: Abdomen is soft. Tenderness: There is no abdominal tenderness. There is no guarding or rebound. Musculoskeletal:         General: Normal range of motion. Cervical back: Normal range of motion and neck supple. Lumbar back: Tenderness present. No swelling, edema, deformity or signs of trauma. Normal range of motion. Negative right straight leg raise test and negative left straight leg raise test.   Skin:     General: Skin is warm and dry. Neurological:      General: No focal deficit present. Mental Status: He is alert and oriented to person, place, and time. Sensory: Sensation is intact. No sensory deficit. Motor: Motor function is intact. No weakness. Gait: Gait is intact. Gait normal.      Deep Tendon Reflexes:      Reflex Scores:       Patellar reflexes are 2+ on the right side and 2+ on the left side. Comments: No saddle anesthesia paresthesias.   5-5 strength in the lower extremities   Psychiatric:         Mood and Affect: Mood normal.         Behavior: Behavior normal.         DIAGNOSTIC RESULTS LABS:    Labs Reviewed - No data to display    When ordered, only abnormal lab results are displayed. All other labs were within normal range or not returned as of this dictation. EKG: When ordered, EKG's are interpreted by the Emergency Department Physician in the absence of a cardiologist.  Please see their note for interpretation of EKG. RADIOLOGY:   Non-plain film images such as CT, Ultrasound and MRI are read by the radiologist. Plain radiographic images are visualized andpreliminarily interpreted by the  ED Provider with the below findings:        Interpretation [facility] Radiologist below, if available at the time of this note:    No orders to display     No results found. PROCEDURES   Unless otherwise noted below, none     Procedures    CRITICAL CARE TIME   N/A    CONSULTS:  None      EMERGENCY DEPARTMENT COURSE and DIFFERENTIAL DIAGNOSIS/MDM:   Vitals:    Vitals:    05/30/22 1748   BP: (!) 152/97   Pulse: 98   Resp: 16   Temp: 98 °F (36.7 °C)   TempSrc: Oral   SpO2: 93%   Weight: (!) 350 lb (158.8 kg)   Height: 5' 9\" (1.753 m)       Patient was given thefollowing medications:  Medications   ketorolac (TORADOL) injection 15 mg (15 mg IntraMUSCular Given 5/30/22 1818)   methylPREDNISolone sodium (SOLU-MEDROL) injection 125 mg (125 mg IntraMUSCular Given 5/30/22 1817)         Is this patient to be included in the SEP-1 Core Measure due to severe sepsis or septic shock? No   Exclusion criteria - the patient is NOT to be included for SEP-1 Core Measure due to: Infection is not suspected    Patient to follow-up family doctor next few days. Patient advised to return back to emerge department for further acute concerns. Patient advised to follow-up with family doctor next 2 to 3 days. Patient verbalized understanding agrees to treatment plan and discharge. No other complaints at this time. Toradol has improved symptoms. Solu-Medrol displayed no findings of improvement at this time.   No sign of cauda equina syndrome. Low suspicion for abdominal or thoracic pathology. Vital signs stable blood pressure mildly elevated he has been advised to follow-up family doctor about elevated blood pressure reading. Patient diagnosed with musculoskeletal low back pain. FINAL IMPRESSION      1.  Acute exacerbation of chronic low back pain          DISPOSITION/PLAN   DISPOSITION Decision To Discharge 05/30/2022 06:07:26 PM      PATIENT REFERREDTO:  Liza Russo PA-C  [address] [facility] [address]  [phone]    Schedule an appointment as soon as possible for a visit       Compa Holden MD  [address] [place] [address]  [phone]    Schedule an appointment as soon as possible for a visit       [facility]  [address] [address]  [phone]  Go to   If symptoms worsen      DISCHARGE MEDICATIONS:  Discharge Medication List as of 5/30/2022  6:26 PM      START taking these medications    Details   methylPREDNISolone (MEDROL, JOSE ANTONIO,) 4 MG tablet Take by mouth., Disp-1 kit, R-0Normal      naproxen (NAPROSYN) 250 MG tablet Take 1 tablet by mouth 2 times daily (with meals), Disp-60 tablet, R-0Normal             DISCONTINUED MEDICATIONS:  Discharge Medication List as of 5/30/2022  6:26 PM                 (Please note that portions ofthis note were completed with a voice recognition program.  Efforts were made to edit the dictations but occasionally words are mis-transcribed.)    ANJEL Beck CNP (electronically signed)            ANJEL Beck CNP  05/30/22 212

## 2022-06-07 ENCOUNTER — OFFICE VISIT (OUTPATIENT)
Dept: ORTHOPEDIC SURGERY | Age: 52
End: 2022-06-07
Payer: MEDICARE

## 2022-06-07 VITALS — WEIGHT: 315 LBS | BODY MASS INDEX: 46.65 KG/M2 | HEIGHT: 69 IN

## 2022-06-07 DIAGNOSIS — M47.816 LUMBAR SPONDYLOSIS: Primary | ICD-10-CM

## 2022-06-07 PROCEDURE — 99214 OFFICE O/P EST MOD 30 MIN: CPT | Performed by: PHYSICIAN ASSISTANT

## 2022-06-07 PROCEDURE — 3017F COLORECTAL CA SCREEN DOC REV: CPT | Performed by: PHYSICIAN ASSISTANT

## 2022-06-07 PROCEDURE — G8427 DOCREV CUR MEDS BY ELIG CLIN: HCPCS | Performed by: PHYSICIAN ASSISTANT

## 2022-06-07 PROCEDURE — G8417 CALC BMI ABV UP PARAM F/U: HCPCS | Performed by: PHYSICIAN ASSISTANT

## 2022-06-07 PROCEDURE — 1036F TOBACCO NON-USER: CPT | Performed by: PHYSICIAN ASSISTANT

## 2022-06-07 NOTE — PROGRESS NOTES
New Patient: LUMBAR SPINE    Referring Provider:  Referral, Self    CHIEF COMPLAINT:  No chief complaint on file. HISTORY OF PRESENT ILLNESS:       Mr. Tara Momin  is a pleasant 47 y.o. male here for consultation regarding his LBP. He states his pain began insidiously about 2 to 3 months ago. Patient was seen at [facility] ED on 5/30/2022 and where he was evaluated and given a Toradol injection along with a steroid injection with some benefit. Patient was also given a Medrol Dosepak to be followed by Naprosyn. His pain has steadily continued since then. He rates his back pain 7/10. Josue Ruiz He describes the pain as constant. Pain is worse with standing and walking and improved some with sitting. He denies numbness and tingling in his right or left leg. He denies weakness of his right or left leg and denies bowel or bladder dysfunction. The pain at times disrupts his sleep.    Pain Assessment  Location of Pain: Back  Severity of Pain: 5  Quality of Pain: Other (Comment)  Duration of Pain: Other (Comment)  Frequency of Pain: Other (Comment)]  Current/Past Treatment:   · Physical Therapy: None  · Chiropractic: None  · Injection: In the past by Dr. Kelly Avila with minimal benefit  · Medications: Naprosyn    Past Medical History:   Past Medical History:   Diagnosis Date    Anxiety     Bilateral primary osteoarthritis of hip 6/30/2020    Bipolar 1 disorder, manic, mild ([address] 11/8/2016    COPD exacerbation ([address] 6/7/2020    Gastroesophageal reflux disease without esophagitis 6/3/2016    HTN (hypertension) 8/7/2015    Hyperlipidemia     IBS (irritable bowel syndrome) 3/21/2013    Low back pain 6/30/2020    MDD (major depressive disorder) 10/28/2013    Migraine syndrome 3/21/2013    Morbid obesity ([address]     Pneumonia     Tobacco abuse         Past Surgical History:     Past Surgical History:   Procedure Laterality Date    CHOLECYSTECTOMY         Current Medications:     Current Outpatient Medications:   naproxen (NAPROSYN) 250 MG tablet, Take 1 tablet by mouth 2 times daily (with meals), Disp: 60 tablet, Rfl: 0    risperiDONE (RISPERDAL) 2 MG tablet, TAKE 1 TABLET BY MOUTH EVERYDAY AT BEDTIME, Disp: , Rfl:     tiZANidine (ZANAFLEX) 4 MG tablet, Take 1 tablet by mouth 2 times daily, Disp: 60 tablet, Rfl: 0    atorvastatin (LIPITOR) 10 MG tablet, Take by mouth nightly Unknown dose, Disp: , Rfl:     escitalopram (LEXAPRO) 10 MG tablet, Take 10 mg by mouth nightly, Disp: , Rfl:     Allergies:  Patient has no known allergies. Social History:    reports that he quit smoking about 8 years ago. His smoking use included cigarettes. He has a 20.00 pack-year smoking history. He has never used smokeless tobacco. He reports that he does not drink alcohol and does not use drugs. Family History:   Family History   Problem Relation Age of Onset    Heart Disease Father     Dementia Mother     Asthma Neg Hx     Cancer Neg Hx     Diabetes Neg Hx     Heart Failure Neg Hx     Hypertension Neg Hx     Emphysema Neg Hx        REVIEW OF SYSTEMS: Full ROS noted & scanned   CONSTITUTIONAL: Denies unexplained weight loss, fevers, chills or fatigue  NEUROLOGICAL: Denies unsteady gait or progressive weakness  MUSCULOSKELETAL: Denies joint swelling or redness  PSYCHOLOGICAL: Patient has a history of anxiety depression and he has bipolar  SKIN: Denies skin changes, delayed healing, rash, itching   HEMATOLOGIC: Denies easy bleeding or bruising  ENDOCRINE: Denies excessive thirst, urination, heat/cold  RESPIRATORY: Denies current dyspnea, cough  GI: Denies nausea, vomiting, diarrhea   : Denies bowel or bladder issues      PHYSICAL EXAM:    Vitals: There were no vitals taken for this visit. GENERAL EXAM:  · General Apparence: Patient is adequately groomed with no evidence of malnutrition. · Orientation: The patient is oriented to time, place and person.    · Mood & Affect:The patient's mood and affect are appropriate. · Vascular: Examination reveals no swelling tenderness in upper or lower extremities. Good capillary refill. · Lymphatic: The lymphatic examination bilaterally reveals all areas to be without enlargement or induration  · Sensation: Sensation is intact without deficit  · Coordination/Balance: Good coordination. LUMBAR/SACRAL EXAMINATION:  · Inspection: Local inspection shows no step-off or bruising. Lumbar alignment is normal.  Sagittal and Coronal balance is neutral.      · Palpation:   No evidence of tenderness at the midline. No tenderness bilaterally at the paraspinal or trochanters. There is no step-off or paraspinal spasm. · Range of Motion: Lumbar flexion, extension and rotation are mildly limited due to pain. · Strength:   Strength testing is 5/5 in all muscle groups tested. · Special Tests:   Straight leg raise and crossed SLR negative. Leg length and pelvis level. · Skin: There are no rashes, ulcerations or lesions. · Reflexes: Reflexes are symmetrically 2+ at the patellar and ankle tendons. Clonus absent bilaterally at the feet. · Gait & station: normal, patient ambulates without assistance    · Additional Examinations:   · RIGHT LOWER EXTREMITY: Inspection/examination of the right lower extremity does not show any tenderness, deformity or injury. Range of motion is unremarkable. There is no gross instability. There are no rashes, ulcerations or lesions. Strength and tone are normal.  · LEFT LOWER EXTREMITY:  Inspection/examination of the left lower extremity does not show any tenderness, deformity or injury. Range of motion is unremarkable. There is no gross instability. There are no rashes, ulcerations or lesions. Strength and tone are normal.    Diagnostic Testing:    MRI lumbar spine that was obtained on 7/14/2020 was reviewed with the patient today which shows  Impression   1. Mild spinal canal stenosis at L2-L3 and L3-L4.    2. Neural foraminal narrowing at L4-L5 and L5-S1 as above. 3. No evidence of spondylolisthesis. Impression:   Acute on chronic low back pain  Mild spinal canal stenosis L2-3 and L3-4  Foraminal stenosis L4-5 and L5-S1    Plan:      · We discussed the diagnosis and treatment options including observation, additional oral steroids, physical therapy, epidural injections and additional imaging. He wishes to proceed with referral to Dr. Monty Last for his continue evaluation and care. We discussed that this point he is not a surgical candidate. .    Follow up -as needed    Old records were reviewed.   Sabina Mayes PA-C  Board certified by the [address] [facility]

## 2022-06-13 ENCOUNTER — TELEPHONE (OUTPATIENT)
Dept: ORTHOPEDIC SURGERY | Age: 52
End: 2022-06-13

## 2022-06-13 NOTE — TELEPHONE ENCOUNTER
General Question     Subject: PATIENT STATES THAT DR. FRANZ HAS STILL NOT RECEIVED THE REFERRAL.   Patient Dave To  Contact Number: [phone]

## 2022-06-15 NOTE — TELEPHONE ENCOUNTER
Patient call back and stated he need his referral to be fax to this new number to Dr. Nitin Zavala the fax number is [phone]. Please Advise.

## 2022-07-21 ENCOUNTER — OFFICE VISIT (OUTPATIENT)
Dept: ORTHOPEDIC SURGERY | Age: 52
End: 2022-07-21
Payer: MEDICARE

## 2022-07-21 VITALS — BODY MASS INDEX: 46.65 KG/M2 | HEIGHT: 69 IN | WEIGHT: 315 LBS

## 2022-07-21 DIAGNOSIS — M47.816 LUMBAR SPONDYLOSIS: Primary | ICD-10-CM

## 2022-07-21 PROCEDURE — 1036F TOBACCO NON-USER: CPT | Performed by: ORTHOPAEDIC SURGERY

## 2022-07-21 PROCEDURE — G8417 CALC BMI ABV UP PARAM F/U: HCPCS | Performed by: ORTHOPAEDIC SURGERY

## 2022-07-21 PROCEDURE — 3017F COLORECTAL CA SCREEN DOC REV: CPT | Performed by: ORTHOPAEDIC SURGERY

## 2022-07-21 PROCEDURE — G8427 DOCREV CUR MEDS BY ELIG CLIN: HCPCS | Performed by: ORTHOPAEDIC SURGERY

## 2022-07-21 PROCEDURE — 99213 OFFICE O/P EST LOW 20 MIN: CPT | Performed by: ORTHOPAEDIC SURGERY

## 2022-07-21 NOTE — PROGRESS NOTES
Outpatient Medications:     naproxen (NAPROSYN) 250 MG tablet, Take 1 tablet by mouth 2 times daily (with meals), Disp: 60 tablet, Rfl: 0    risperiDONE (RISPERDAL) 2 MG tablet, TAKE 1 TABLET BY MOUTH EVERYDAY AT BEDTIME, Disp: , Rfl:     tiZANidine (ZANAFLEX) 4 MG tablet, Take 1 tablet by mouth 2 times daily, Disp: 60 tablet, Rfl: 0    atorvastatin (LIPITOR) 10 MG tablet, Take by mouth nightly Unknown dose, Disp: , Rfl:     escitalopram (LEXAPRO) 10 MG tablet, Take 10 mg by mouth nightly, Disp: , Rfl:     Allergies:  Patient has no known allergies. Social History:    reports that he quit smoking about 8 years ago. His smoking use included cigarettes. He has a 20.00 pack-year smoking history. He has never used smokeless tobacco. He reports that he does not drink alcohol and does not use drugs. Family History:   Family History   Problem Relation Age of Onset    Heart Disease Father     Dementia Mother     Asthma Neg Hx     Cancer Neg Hx     Diabetes Neg Hx     Heart Failure Neg Hx     Hypertension Neg Hx     Emphysema Neg Hx        REVIEW OF SYSTEMS: Full ROS noted & scanned   CONSTITUTIONAL: Denies unexplained weight loss, fevers, chills or fatigue  NEUROLOGICAL: Denies unsteady gait or progressive weakness  MUSCULOSKELETAL: Denies joint swelling or redness  PSYCHOLOGICAL: Patient has a history of anxiety depression and he has bipolar  SKIN: Denies skin changes, delayed healing, rash, itching   HEMATOLOGIC: Denies easy bleeding or bruising  ENDOCRINE: Denies excessive thirst, urination, heat/cold  RESPIRATORY: Denies current dyspnea, cough  GI: Denies nausea, vomiting, diarrhea   : Denies bowel or bladder issues      PHYSICAL EXAM:    Vitals: Height 5' 9.02\" (1.753 m), weight (!) 350 lb (158.8 kg). GENERAL EXAM:  General Apparence: Patient is adequately groomed with no evidence of malnutrition. Orientation: The patient is oriented to time, place and person.    Mood & Affect:The patient's mood and affect are appropriate. Vascular: Examination reveals no swelling tenderness in upper or lower extremities. Good capillary refill. Lymphatic: The lymphatic examination bilaterally reveals all areas to be without enlargement or induration  Sensation: Sensation is intact without deficit  Coordination/Balance: Good coordination. LUMBAR/SACRAL EXAMINATION:  Inspection: Local inspection shows no step-off or bruising. Lumbar alignment is normal.  Sagittal and Coronal balance is neutral.      Palpation:   No evidence of tenderness at the midline. No tenderness bilaterally at the paraspinal or trochanters. There is no step-off or paraspinal spasm. Range of Motion: Lumbar flexion, extension and rotation are mildly limited due to pain. Strength:   Strength testing is 5/5 in all muscle groups tested. Special Tests:   Straight leg raise and crossed SLR negative. Leg length and pelvis level. Skin: There are no rashes, ulcerations or lesions. Reflexes: Reflexes are symmetrically 2+ at the patellar and ankle tendons. Clonus absent bilaterally at the feet. Gait & station: normal, patient ambulates without assistance    Additional Examinations:   RIGHT LOWER EXTREMITY: Inspection/examination of the right lower extremity does not show any tenderness, deformity or injury. Range of motion is unremarkable. There is no gross instability. There are no rashes, ulcerations or lesions. Strength and tone are normal.  LEFT LOWER EXTREMITY:  Inspection/examination of the left lower extremity does not show any tenderness, deformity or injury. Range of motion is unremarkable. There is no gross instability. There are no rashes, ulcerations or lesions. Strength and tone are normal.    Diagnostic Testing:    MRI lumbar spine that was obtained on 7/14/2020 was reviewed with the patient today which shows  Impression   1. Mild spinal canal stenosis at L2-L3 and L3-L4. 2. Neural foraminal narrowing at L4-L5 and L5-S1 as above.    3. No evidence of spondylolisthesis. Reviewed CT images of his chest from 6/6/2020. Those suggest autofusion consistent with DISH. Impression:   Lumbar degenerative disc disease  Diffuse idiopathic skeletal hyperostosis    Plan:      We discussed the diagnosis and treatment options including observation, additional oral steroids, physical therapy, epidural injections and additional imaging.  He wishes to proceed with physical therapy    Follow up -as needed

## 2022-07-26 ENCOUNTER — HOSPITAL ENCOUNTER (OUTPATIENT)
Dept: PHYSICAL THERAPY | Age: 52
Setting detail: THERAPIES SERIES
Discharge: HOME | End: 2022-07-26
Payer: MEDICARE

## 2022-07-26 PROCEDURE — 97110 THERAPEUTIC EXERCISES: CPT

## 2022-07-26 PROCEDURE — 97161 PT EVAL LOW COMPLEX 20 MIN: CPT

## 2022-07-26 PROCEDURE — 97112 NEUROMUSCULAR REEDUCATION: CPT

## 2022-07-26 NOTE — PLAN OF CARE
[address] [place] [address]  [address], [place] [place] [address], [address] [place] [address] [address]  Phone: [phone]   Fax:     [phone]     Niyah Norris    Dear  Dr. Ruben Diane,    We had the pleasure of evaluating the following patient for physical therapy services at [address] [place] [address]. A summary of our findings can be found in the initial assessment below. This includes our plan of care. If you have any questions or concerns regarding these findings, please do not hesitate to contact me at the office phone number checked above. Thank you for the referral.       Physician Signature:_______________________________Date:__________________  By signing above (or electronic signature), therapists plan is approved by physician      Patient: Dennis Stern   : 1970   MRN: [id number]  Referring Physician:  Dr. Ruben Diane      Evaluation Date: 2022      Medical Diagnosis Information:  Diagnosis: M47.816 (ICD-10-CM) - Lumbar spondylosis   Treatment Diagnosis: LBP with movement dysfunction                                         Insurance information: PT Insurance Information: PARAMOUNT     Precautions/ Contra-indications: OA    C-SSRS Triggered by Intake questionnaire (Past 2 wk assessment):   [x] No, Questionnaire did not trigger screening.   [] Yes, Patient intake triggered further evaluation      [] C-SSRS Screening completed  [] PCP notified via Plan of Care  [] Emergency services notified     Latex Allergy:  [x]NO      []YES  Preferred Language for Healthcare:   [x]English       []other:    SUBJECTIVE: Patient stated complaint: Pt reports LBP for > 6 months with insidious onset. Pt denies bowel or bladder changes at this time. Pt denies n/t at this time. He states his pain is the worst after sitting for prolonged periods of time and transitioning to a standing position.  Pt has received an MRI with evidence of degenerative changes. Relevant Medical History:na  FOTO Score:   46    Pain Scale: 5/10  Easing factors: rest, laying  Provocative factors: prolonged sitting, standing     Type: []Constant   []Intermittent  []Radiating []Localized []other:     Numbness/Tingling: none    Occupation/School: na    Living Status/Prior Level of Function: Independent with ADLs and IADLs,     OBJECTIVE:     ROM  Comments   Lumbar Flex Limited P!     Lumbar Ext Limited P!      ROM LEFT RIGHT Comments   Lumbar Side Bend limited limited    Lumbar Rotation      Hip Flexion      Hip Abd      Hip ER      Hip IR      Hip Extension      Knee Ext      Knee Flex      Hamstring Flex Limited  limited    Piriformis                    Strength LEFT RIGHT Comments   Multifidus      Transverse Ab      Hip Flexors      Hip Abductors      Hip Extensors                     Myotomes Normal Abnormal Comments   Hip flexion (L1-L2) x     Knee extension (L2-L4) x     Dorsiflexion (L4-L5) x     Great Toe Ext (L5) x     Ankle Eversion (S1-S2) x     Ankle PF(S1-S2) x       Dermatomes Normal Abnormal Comments   inguinal area (L1)  x     anterior mid-thigh (L2) x     distal ant thigh/med knee (L3) x     medial lower leg and foot (L4) x     lateral lower leg and foot (L5) x     posterior calf (S1) x     medial calcaneus (S2) x       Neural dynamic tension testing Normal Abnormal Comments   Slump Test  - Degree of knee flexion:  nt     SLR  nt     0-30 nt     30-70 nt     Femoral nerve (L2-4) nt       Reflexes Normal Abnormal Comments   S1-2 Seated achilles nt     S1-2 Prone knee bend nt     L3-4 Patellar tendon nt     C5-6 Biceps nt     C6 Brachioradialis nt     C7-8 Triceps nt     Clonus nt     Babinski nt     Willson's nt       Joint mobility: na   []Normal    []Hypo   []Hyper    Palpation: unremarkable     Functional Mobility/Transfers: antalgic transfers    Posture: forward flexed posture    Gait: (include devices/WB status) antalgic Bandages/Dressings/Incisions: NA    Orthopedic Special Tests:    Normal Abnormal N/A Comments   Toe walk         Heel Walk       Fwd Bend-aberrant or innominate mvmt)       Trendelenburg       Kemps/Quadrant       Drek       CHERRI/Jarrod       Hip scour       SLR       Crossed SLR       Supine to sit       Hip thrust       SI distraction/compression       PA/Spring       Prone Instability test       Prone knee bend       Sacral Spring/thrust                  [x] Patient history, allergies, meds reviewed. Medical chart reviewed. See intake form. Review Of Systems (ROS):  [x]Performed Review of systems (Integumentary, CardioPulmonary, Neurological) by intake and observation. Intake form has been scanned into medical record. Patient has been instructed to contact their primary care physician regarding ROS issues if not already being addressed at this time.       Co-morbidities/Complexities (which will affect course of rehabilitation):   []None           Arthritic conditions   []Rheumatoid arthritis (M05.9)  [x]Osteoarthritis (M19.91)   Cardiovascular conditions   []Hypertension (I10)  []Hyperlipidemia (E78.5)  []Angina pectoris (I20)  []Atherosclerosis (I70)   Musculoskeletal conditions   []Disc pathology   []Congenital spine pathologies   []Prior surgical intervention  []Osteoporosis (M81.8)  []Osteopenia (M85.8)   Endocrine conditions   []Hypothyroid (E03.9)  []Hyperthyroid Gastrointestinal conditions   []Constipation (O60.16)   Metabolic conditions   [x]Morbid obesity (E66.01)  []Diabetes type 1(E10.65) or 2 (E11.65)   []Neuropathy (G60.9)     Pulmonary conditions   []Asthma (J45)  []Coughing   []COPD (J44.9)   Psychological Disorders  []Anxiety (F41.9)  []Depression (F32.9)   []Other:   []Other:           Barriers to/and or personal factors that will affect rehab potential:              []Age  []Sex              []Motivation/Lack of Motivation                        [x]Co-Morbidities []Cognitive Function, education/learning barriers              []Environmental, home barriers              []profession/work barriers  []past PT/medical experience  []other:  Justification: Pt has multiple co-morbidities that may decrease possible outcome potential.       Falls Risk Assessment (30 days):   [x] Falls Risk assessed and no intervention required. [] Falls Risk assessed and Patient requires intervention due to being higher risk   TUG score (>12s at risk):     [] Falls education provided, including       G-Codes:       ASSESSMENT:   Functional Impairments:     [x]Noted lumbar/proximal hip hypomobility   []Noted lumbosacral and/or generalized hypermobility   [x]Decreased Lumbosacral/hip/LE functional ROM   [x]Decreased core/proximal hip strength and neuromuscular control    []Decreased LE functional strength    []Abnormal reflexes/sensation/myotomal/dermatomal deficits  []Reduced balance/proprioceptive control    []other:      Functional Activity Limitations (from functional questionnaire and intake)   [x]Reduced ability to tolerate prolonged functional positions   [x]Reduced ability or difficulty with changes of positions or transfers between positions   [x]Reduced ability to maintain good posture and demonstrate good body mechanics with sitting, bending, and lifting   [x]Reduced ability to sleep   [x] Reduced ability or tolerance with driving and/or computer work   [x]Reduced ability to perform lifting, reaching, carrying tasks   [x]Reduced ability to squat   [x]Reduced ability to forward bend   [x]Reduced ability to ambulate prolonged functional periods/distances/surfaces   [x]Reduced ability to ascend/descend stairs   []other:       Participation Restrictions   [x]Reduced participation in self care activities   [x]Reduced participation in home management activities   [x]Reduced participation in work activities   [x]Reduced participation in social activities.    [x]Reduced participation in sport/recreational activities. Classification:   []Signs/symptoms consistent with Lumbar instability/stabilization subgroup. [x]Signs/symptoms consistent with Lumbar mobilization/manipulation subgroup, myotomes and dermatomes intact. Meets manipulation criteria. [x]Signs/symptoms consistent with Lumbar direction specific/centralization subgroup   []Signs/symptoms consistent with Lumbar traction subgroup     [x]Signs/symptoms consistent with lumbar facet dysfunction   []Signs/symptoms consistent with lumbar stenosis type dysfunction   []Signs/symptoms consistent with nerve root involvement including myotome & dermatome dysfunction   []Signs/symptoms consistent with post-surgical status including: decreased ROM, strength and function.    []signs/symptoms consistent with pathology which may benefit from Dry needling     []other:      Prognosis/Rehab Potential:      []Excellent   []Good    [x]Fair   []Poor    Tolerance of evaluation/treatment:    []Excellent   [x]Good    []Fair   []Poor     Physical Therapy Evaluation Complexity Justification  [] A history of present problem with:  [] no personal factors and/or comorbidities that impact the plan of care;  [x]1-2 personal factors and/or comorbidities that impact the plan of care  []3 personal factors and/or comorbidities that impact the plan of care  [] An examination of body systems using standardized tests and measures addressing any of the following: body structures and functions (impairments), activity limitations, and/or participation restrictions;:  [] a total of 1-2 or more elements   [x] a total of 3 or more elements   [] a total of 4 or more elements   [] A clinical presentation with:  [x] stable and/or uncomplicated characteristics   [] evolving clinical presentation with changing characteristics  [] unstable and unpredictable characteristics;   [] Clinical decision making of [x] low, [] moderate, [] high complexity using standardized patient assessment instrument and/or measurable assessment of functional outcome. [x] EVAL (LOW) 65697 (typically 20 minutes face-to-face)  [] EVAL (MOD) 31004 (typically 30 minutes face-to-face)  [] EVAL (HIGH) 10520 (typically 45 minutes face-to-face)  [] RE-EVAL     PLAN: Begin PT focusing on: proximal hip mobilizations, LB mobs, LB core activation, proximal hip activation, and HEP    Frequency/Duration:  2 days per week for 12 Weeks:  Interventions:  [x]  Therapeutic exercise including: strength training, ROM, for LE, Glutes and core   [x]  NMR activation and proprioception for glutes , LE and Core   [x]  Manual therapy as indicated for Hip complex, LE and spine to include: Dry Needling/IASTM, STM, PROM, Gr I-IV mobilizations, manipulation. [x]  Modalities as needed that may include: thermal agents, E-stim, Biofeedback, US, iontophoresis as indicated  [x]  Patient education on joint protection, postural re-education, activity modification, progression of HEP. HEP instruction: Refer to [address] [place] [address] access code and exercises on the 1st visit treatment note    GOALS:  Patient stated goal: Pt would like to return to pain free recreational activities. Therapist goals for Patient:   Short Term Goals: To be achieved in: 2 weeks  1. Independent in HEP and progression per patient tolerance, in order to prevent re-injury. [x] Progressing: [] Met: [] Not Met: [] Adjusted  2. Patient will have a decrease in pain to facilitate improvement in movement, function, and ADLs as indicated by Functional Deficits. [x] Progressing: [] Met: [] Not Met: [] Adjusted    Long Term Goals: To be achieved in: 12 weeks  1. FOTO score will match or exceed predicted score to assist with reaching prior level of function. [x] Progressing: [] Met: [] Not Met: [] Adjusted  2. Patient will demonstrate increased AROM to WNL, good LS mobility, good hip ROM to allow for proper joint functioning as indicated by patients Functional Deficits.    [x] Progressing: [] Met: [] Not Met: [] Adjusted  3. Patient will demonstrate an increase in Strength to good proximal hip and core activation to allow for proper functional mobility as indicated by patients Functional Deficits. [x] Progressing: [] Met: [] Not Met: [] Adjusted  4. Patient will return to all functional activities without increased symptoms or restriction. [x] Progressing: [] Met: [] Not Met: [] Adjusted  5.  Pt will demonstrate the ability to perform all ADL's with 0/10 pain. (patient specific functional goal)    [x] Progressing: [] Met: [] Not Met: [] Adjusted     Electronically signed by:  Ildefonso Stewart PT

## 2022-07-26 NOTE — FLOWSHEET NOTE
[facility], [address] [place] [address], [address] [place] [address] [address]  Phone: [phone]   Fax:     [phone]      Physical Therapy Treatment Note/ Progress Report:       Date:  2022    Patient Name:  Nubia Chavez    :  1970  MRN: [id number]  Restrictions/Precautions:    Medical/Treatment Diagnosis Information:  Diagnosis: M47.816 (ICD-10-CM) - Lumbar spondylosis  Treatment Diagnosis: LBP with movement dysfunction  Insurance/Certification information:  PT Insurance Information: Shirley  Physician Information:   Dr. Day Benitez  Has the plan of care been signed (Y/N):        []  Yes  [x]  No     Date of Patient follow up with Physician: RICCOD    Is this a Progress Report:     []  Yes  [x]  No      If Yes:  Date Range for reporting period:  Beginnin22 ------------ Endin22    Progress report will be due (10 Rx or 30 days whichever is less): 03     Recertification will be due (POC Duration  / 90 days whichever is less): 10/26/22      Visit # Insurance Allowable Auth Required   In Person 1 ?  []  Yes     []  No    Tele Health   []  Yes     []  No    Total 1       FOTO Score: 46     Date assessed:  22      Latex Allergy:  [x]NO      []YES  Preferred Language for Healthcare:   [x]English       []other:    Pain level:  5/10     SUBJECTIVE:  See eval    OBJECTIVE: See eval  Observation:   Test measurements:      RESTRICTIONS/PRECAUTIONS: OA    Exercises/Interventions:   Therapeutic Ex (79891) Sets/sec Reps Notes/CUES HEP   Seated hamstring S 20 3     Seated lumbar flex/ext 2 10     Mini squats to high table 2 10     Supine  10     LTR 2 10                                                      Manual Intervention (44996)                                                 NMR re-education (08603)   CUES NEEDED                                                                   Therapeutic Activity (07892) [place] access code: [email or web address]           Therapeutic Exercise and NMR EXR  [x] (86653) Provided verbal/tactile cueing for activities related to strengthening, flexibility, endurance, ROM  for improvements in proximal hip and core control with self care, mobility, lifting and ambulation.  [] (75825) Provided verbal/tactile cueing for activities related to improving balance, coordination, kinesthetic sense, posture, motor skill, proprioception  to assist with core control in self care, mobility, lifting, and ambulation. Therapeutic Activities:    [x] (50972 or 95889) Provided verbal/tactile cueing for activities related to improving balance, coordination, kinesthetic sense, posture, motor skill, proprioception and motor activation to allow for proper function  with self care and ADLs  [] (52515) Provided training and instruction to the patient for proper core and proximal hip recruitment and positioning with ambulation re-education     Home Exercise Program:    [x] (21432) Reviewed/Progressed HEP activities related to strengthening, flexibility, endurance, ROM of core, proximal hip and LE for functional self-care, mobility, lifting and ambulation   [] (31202) Reviewed/Progressed HEP activities related to improving balance, coordination, kinesthetic sense, posture, motor skill, proprioception of core, proximal hip and LE for self care, mobility, lifting, and ambulation      Manual Treatments:  PROM / STM / Oscillations-Mobs:  G-I, II, III, IV (PA's, Inf., Post.)  [x] (62819) Provided manual therapy to mobilize proximal hip and LS spine soft tissue/joints for the purpose of modulating pain, promoting relaxation,  increasing ROM, reducing/eliminating soft tissue swelling/inflammation/restriction, improving soft tissue extensibility and allowing for proper ROM for normal function with self care, mobility, lifting and ambulation.      Modalities:     [x] GAME READY (VASO)- for significant edema, swelling, pain control. Charges:  Timed Code Treatment Minutes: 25   Total Treatment Minutes:  45   BWC:  TE TIME:  NMR TIME:  MANUAL TIME:  UNTIMED MINUTES:  Medicare Total:   15  10    20        [x] EVAL (LOW) 87142 (typically 20 minutes face-to-face)  [] EVAL (MOD) 08207 (typically 30 minutes face-to-face)  [] EVAL (HIGH) 30930 (typically 45 minutes face-to-face)  [] RE-EVAL     [x] XP(52062) x 1    [] IONTO  [x] NMR (85344) x  1   [] VASO  [] Manual (42176) x     [] Other:  [] TA x      [] Mech Traction (83578)  [] ES(attended) (12192)      [] ES (un) (41902):       ASSESSMENT:  See eval      GOALS:   Patient stated goal: Pt would like to return to pain free recreational activities. Therapist goals for Patient:   Short Term Goals: To be achieved in: 2 weeks  1. Independent in HEP and progression per patient tolerance, in order to prevent re-injury. [x] Progressing: [] Met: [] Not Met: [] Adjusted  2. Patient will have a decrease in pain to facilitate improvement in movement, function, and ADLs as indicated by Functional Deficits. [x] Progressing: [] Met: [] Not Met: [] Adjusted    Long Term Goals: To be achieved in: 12 weeks  1. FOTO score will match or exceed predicted score to assist with reaching prior level of function. [x] Progressing: [] Met: [] Not Met: [] Adjusted  2. Patient will demonstrate increased AROM to WNL, good LS mobility, good hip ROM to allow for proper joint functioning as indicated by patients Functional Deficits. [x] Progressing: [] Met: [] Not Met: [] Adjusted  3. Patient will demonstrate an increase in Strength to good proximal hip and core activation to allow for proper functional mobility as indicated by patients Functional Deficits. [x] Progressing: [] Met: [] Not Met: [] Adjusted  4. Patient will return to all functional activities without increased symptoms or restriction. [x] Progressing: [] Met: [] Not Met: [] Adjusted  5.  Pt will demonstrate the ability to perform all ADL's with 0/10 pain. (patient specific functional goal)    [x] Progressing: [] Met: [] Not Met: [] Adjusted         Overall Progression Towards Functional goals/ Treatment Progress Update:  [] Patient is progressing as expected towards functional goals listed. [] Progression is slowed due to complexities/Impairments listed. [] Progression has been slowed due to co-morbidities. [x] Plan just implemented, too soon to assess goals progression <30days   [] Goals require adjustment due to lack of progress  [] Patient is not progressing as expected and requires additional follow up with physician  [] Other    Prognosis for POC: [x] Good [] Fair  [] Poor      Patient requires continued skilled intervention: [x] Yes  [] No    Treatment/Activity Tolerance:  [x] Patient able to complete treatment  [] Patient limited by fatigue  [] Patient limited by pain    [] Patient limited by other medical complications  [] Other:     Return to Play: (if applicable)   []  Stage 1: Intro to Strength   []  Stage 2: Return to Run and Strength   []  Stage 3: Return to Jump and Strength   []  Stage 4: Dynamic Strength and Agility   []  Stage 5: Sport Specific Training     []  Ready to Return to Play, Meets All Above Stages   []  Not Ready for Return to Sports   Comments:                           PLAN: See eval  [] Continue per plan of care [] Alter current plan (see comments above)  [x] Plan of care initiated [] Hold pending MD visit [] Discharge    Electronically signed by:  Pierre Girard PT    Note: If patient does not return for scheduled/ recommended follow up visits, this note will serve as a discharge from care along with most recent update on progress.

## 2022-07-26 NOTE — PROGRESS NOTES
[facility] [facility], [address] [place] [address], [address] [place] [address] [address]  Phone: [phone]   Fax: [phone]    Date: 2022          Patient Name; :  Larisa Diaz; 1970   Dx: Diagnosis: M47.816 (ICD-10-CM) - Lumbar spondylosis      Physician:  Dr. Kina Peters        Total PT Visits:      Measures Previous Current   Pain (0-10)     Disability %     ROM               Strength                 Specific Functional Improvements & Impressions:      Plan & Recommendations:  [] Continue rehabilitation due to objective improvement and continued functional deficits with frequency and duration:  [] Progress toward  []GAP, []Work Conditioning, []Independent HEP   [] Discharge due to   [] All goals achieved, [] Maximized \"medical necessity\" [] No subjective or objective improvements      Electronically signed by:  Jamel Lopez, PT  Therapy Plan of Care Re-Certification  This patient has been re-evaluated for physical therapy services and for therapy to continue, Medicare, Medicaid and other insurances require periodic physician review of the treatment plan. Please review the above re-evaluation and verify that you agree with plan of care as established above by signing the attached document and return it to our office or note changes to established plan below  [] Follow treatment plan as above [] Discontinue physical therapy  [] Change plan to:                                 __________________________________________________    Physician Signature:____________________________________ Date:____________  By signing above, therapists plan is approved by physician    If you have any questions or concerns, please don't hesitate to call.   Thank you for your referral.

## 2022-08-02 ENCOUNTER — HOSPITAL ENCOUNTER (OUTPATIENT)
Dept: PHYSICAL THERAPY | Age: 52
Setting detail: THERAPIES SERIES
Discharge: HOME | End: 2022-08-02
Payer: MEDICARE

## 2022-08-02 PROCEDURE — 97110 THERAPEUTIC EXERCISES: CPT

## 2022-08-02 PROCEDURE — 97112 NEUROMUSCULAR REEDUCATION: CPT

## 2022-08-02 NOTE — FLOWSHEET NOTE
[address] [facility], [address] [place] [address], [address] [place] [address] [address]  Phone: [phone]   Fax:     [phone]      Physical Therapy Treatment Note/ Progress Report:       Date:  2022    Patient Name:  Yocasta Hatfield    :  1970  MRN: [id number]  Restrictions/Precautions:    Medical/Treatment Diagnosis Information:  Diagnosis: M47.816 (ICD-10-CM) - Lumbar spondylosis  Treatment Diagnosis: LBP with movement dysfunction  Insurance/Certification information:  PT Insurance Information: [place]  Physician Information:   Dr. Marco Antonio Bains  Has the plan of care been signed (Y/N):        []  Yes  [x]  No     Date of Patient follow up with Physician: RICCOD    Is this a Progress Report:     []  Yes  [x]  No      If Yes:  Date Range for reporting period:  Beginnin22 ------------ Endin22    Progress report will be due (10 Rx or 30 days whichever is less): 3/56/89     Recertification will be due (POC Duration  / 90 days whichever is less): 10/26/22      Visit # Insurance Allowable Auth Required   In Person 2 ? []  Yes     []  No    Tele Health   []  Yes     []  No    Total 2       FOTO Score: 46     Date assessed:  22      Latex Allergy:  [x]NO      []YES  Preferred Language for Healthcare:   [x]English       []other:    Pain level:  5/10     SUBJECTIVE:  Pt reports his HEP went ok.       OBJECTIVE:   Observation:   Test measurements:      RESTRICTIONS/PRECAUTIONS: OA    Exercises/Interventions:   Therapeutic Ex (11270) Sets/sec Reps Notes/CUES HEP   Seated hamstring S 20 3     Seated lumbar flex/ext 2 10     Mini squats to high table 2 10     Supine  10     LTR 2 10     Beginner bridge 2 10     Seated  10     Tb ext 2 10 BTB    Standing hip abd 2 10     Step up 4\" 1 10     Standing HR 2 10                                        Manual Intervention (66411)                                                 NMR re-education (10543) CUES NEEDED                                                                   Therapeutic Activity (80819)                                          Medium access code: [email or web address]           Therapeutic Exercise and NMR EXR  [x] (79847) Provided verbal/tactile cueing for activities related to strengthening, flexibility, endurance, ROM  for improvements in proximal hip and core control with self care, mobility, lifting and ambulation.  [] (70286) Provided verbal/tactile cueing for activities related to improving balance, coordination, kinesthetic sense, posture, motor skill, proprioception  to assist with core control in self care, mobility, lifting, and ambulation.      Therapeutic Activities:    [x] (50343 or 80595) Provided verbal/tactile cueing for activities related to improving balance, coordination, kinesthetic sense, posture, motor skill, proprioception and motor activation to allow for proper function  with self care and ADLs  [] (72147) Provided training and instruction to the patient for proper core and proximal hip recruitment and positioning with ambulation re-education     Home Exercise Program:    [x] (12015) Reviewed/Progressed HEP activities related to strengthening, flexibility, endurance, ROM of core, proximal hip and LE for functional self-care, mobility, lifting and ambulation   [] (50156) Reviewed/Progressed HEP activities related to improving balance, coordination, kinesthetic sense, posture, motor skill, proprioception of core, proximal hip and LE for self care, mobility, lifting, and ambulation      Manual Treatments:  PROM / STM / Oscillations-Mobs:  G-I, II, III, IV (PA's, Inf., Post.)  [x] (59582) Provided manual therapy to mobilize proximal hip and LS spine soft tissue/joints for the purpose of modulating pain, promoting relaxation,  increasing ROM, reducing/eliminating soft tissue swelling/inflammation/restriction, improving soft tissue extensibility and allowing for proper ROM for normal

## 2022-08-04 ENCOUNTER — HOSPITAL ENCOUNTER (OUTPATIENT)
Dept: PHYSICAL THERAPY | Age: 52
Setting detail: THERAPIES SERIES
Discharge: HOME | End: 2022-08-04
Payer: MEDICARE

## 2022-08-04 PROCEDURE — 97110 THERAPEUTIC EXERCISES: CPT

## 2022-08-04 PROCEDURE — 97112 NEUROMUSCULAR REEDUCATION: CPT

## 2022-08-04 NOTE — FLOWSHEET NOTE
723 [facility], [address] [place] [address], [address] [place] [address] [address]  Phone: [phone]   Fax:     [phone]      Physical Therapy Treatment Note/ Progress Report:       Date:  2022    Patient Name:  Audrey Sosa    :  1970  MRN: [id number]  Restrictions/Precautions:    Medical/Treatment Diagnosis Information:  Diagnosis: M47.816 (ICD-10-CM) - Lumbar spondylosis  Treatment Diagnosis: LBP with movement dysfunction  Insurance/Certification information:  PT Insurance Information: [place]  Physician Information:   Dr. Marisol Jennings  Has the plan of care been signed (Y/N):        []  Yes  [x]  No     Date of Patient follow up with Physician: RICCOD    Is this a Progress Report:     []  Yes  [x]  No      If Yes:  Date Range for reporting period:  Beginnin22 ------------ Endin22    Progress report will be due (10 Rx or 30 days whichever is less): 3/36/65     Recertification will be due (POC Duration  / 90 days whichever is less): 10/26/22      Visit # Insurance Allowable Auth Required   In Person 3 ? []  Yes     []  No    Tele Health   []  Yes     []  No    Total 3       FOTO Score: 46     Date assessed:  22      Latex Allergy:  [x]NO      []YES  Preferred Language for Healthcare:   [x]English       []other:    Pain level:  5/10     SUBJECTIVE:  Pt reports he is stiff today.       OBJECTIVE:   Observation:   Test measurements:      RESTRICTIONS/PRECAUTIONS: OA    Exercises/Interventions:   Therapeutic Ex (38290) Sets/sec Reps Notes/CUES HEP   Standing hamstring S 20 3     Seated lumbar flex SB 2 10     Mini squats to high table 2 10         LTR 2 10     Beginner bridge 2 10     Standing march 2 10     Tb ext 2 10 BTB    Standing hip abd 2 10     Step up 6\" 1 10     Standing HR 2 10     LAQ 3 10 3#    Pallof press 2 10     Single arm farm carry x2  12#                  Manual Intervention (12303) NMR re-education (56688)   CUES NEEDED                                                                   Therapeutic Activity (03991)                                          Friend[employer] App access code: [email or web address]           Therapeutic Exercise and NMR EXR  [x] (86581) Provided verbal/tactile cueing for activities related to strengthening, flexibility, endurance, ROM  for improvements in proximal hip and core control with self care, mobility, lifting and ambulation.  [] (80160) Provided verbal/tactile cueing for activities related to improving balance, coordination, kinesthetic sense, posture, motor skill, proprioception  to assist with core control in self care, mobility, lifting, and ambulation.      Therapeutic Activities:    [x] (92077 or 22866) Provided verbal/tactile cueing for activities related to improving balance, coordination, kinesthetic sense, posture, motor skill, proprioception and motor activation to allow for proper function  with self care and ADLs  [] (68668) Provided training and instruction to the patient for proper core and proximal hip recruitment and positioning with ambulation re-education     Home Exercise Program:    [x] (72424) Reviewed/Progressed HEP activities related to strengthening, flexibility, endurance, ROM of core, proximal hip and LE for functional self-care, mobility, lifting and ambulation   [] (08758) Reviewed/Progressed HEP activities related to improving balance, coordination, kinesthetic sense, posture, motor skill, proprioception of core, proximal hip and LE for self care, mobility, lifting, and ambulation      Manual Treatments:  PROM / STM / Oscillations-Mobs:  G-I, II, III, IV (PA's, Inf., Post.)  [x] (66652) Provided manual therapy to mobilize proximal hip and LS spine soft tissue/joints for the purpose of modulating pain, promoting relaxation,  increasing ROM, reducing/eliminating soft tissue swelling/inflammation/restriction, improving soft tissue extensibility and allowing for proper ROM for normal function with self care, mobility, lifting and ambulation. Modalities:     [x] GAME READY (VASO)- for significant edema, swelling, pain control. Charges:  Timed Code Treatment Minutes: 38   Total Treatment Minutes:  38   BWC:  TE TIME:  NMR TIME:  MANUAL TIME:  UNTIMED MINUTES:  Medicare Total:   25  13            [] EVAL (LOW) 43719 (typically 20 minutes face-to-face)  [] EVAL (MOD) 37382 (typically 30 minutes face-to-face)  [] EVAL (HIGH) 03029 (typically 45 minutes face-to-face)  [] RE-EVAL     [x] FS(86101) x 2    [] IONTO  [x] NMR (19477) x  1   [] VASO  [] Manual (63450) x     [] Other:  [] TA x      [] Mech Traction (79523)  [] ES(attended) (94981)      [] ES (un) (68416):       ASSESSMENT:  See eval      GOALS:   Patient stated goal: Pt would like to return to pain free recreational activities. Therapist goals for Patient:   Short Term Goals: To be achieved in: 2 weeks  1. Independent in HEP and progression per patient tolerance, in order to prevent re-injury. [x] Progressing: [] Met: [] Not Met: [] Adjusted  2. Patient will have a decrease in pain to facilitate improvement in movement, function, and ADLs as indicated by Functional Deficits. [x] Progressing: [] Met: [] Not Met: [] Adjusted    Long Term Goals: To be achieved in: 12 weeks  1. FOTO score will match or exceed predicted score to assist with reaching prior level of function. [x] Progressing: [] Met: [] Not Met: [] Adjusted  2. Patient will demonstrate increased AROM to WNL, good LS mobility, good hip ROM to allow for proper joint functioning as indicated by patients Functional Deficits. [x] Progressing: [] Met: [] Not Met: [] Adjusted  3. Patient will demonstrate an increase in Strength to good proximal hip and core activation to allow for proper functional mobility as indicated by patients Functional Deficits. [x] Progressing: [] Met: [] Not Met: [] Adjusted  4.  Patient will return to all functional activities without increased symptoms or restriction. [x] Progressing: [] Met: [] Not Met: [] Adjusted  5. Pt will demonstrate the ability to perform all ADL's with 0/10 pain. (patient specific functional goal)    [x] Progressing: [] Met: [] Not Met: [] Adjusted         Overall Progression Towards Functional goals/ Treatment Progress Update:  [] Patient is progressing as expected towards functional goals listed. [] Progression is slowed due to complexities/Impairments listed. [] Progression has been slowed due to co-morbidities. [x] Plan just implemented, too soon to assess goals progression <30days   [] Goals require adjustment due to lack of progress  [] Patient is not progressing as expected and requires additional follow up with physician  [] Other    Prognosis for POC: [x] Good [] Fair  [] Poor      Patient requires continued skilled intervention: [x] Yes  [] No    Treatment/Activity Tolerance:  [x] Patient able to complete treatment  [] Patient limited by fatigue  [] Patient limited by pain    [] Patient limited by other medical complications  [] Other:     Return to Play: (if applicable)   []  Stage 1: Intro to Strength   []  Stage 2: Return to Run and Strength   []  Stage 3: Return to Jump and Strength   []  Stage 4: Dynamic Strength and Agility   []  Stage 5: Sport Specific Training     []  Ready to Return to Play, Meets All Above Stages   []  Not Ready for Return to Sports   Comments:                           PLAN: See eval  [] Continue per plan of care [] Alter current plan (see comments above)  [x] Plan of care initiated [] Hold pending MD visit [] Discharge    Electronically signed by:  Luis Miguel Thomas, PT    Note: If patient does not return for scheduled/ recommended follow up visits, this note will serve as a discharge from care along with most recent update on progress.

## 2022-08-09 ENCOUNTER — HOSPITAL ENCOUNTER (OUTPATIENT)
Dept: PHYSICAL THERAPY | Age: 52
Setting detail: THERAPIES SERIES
Discharge: HOME | End: 2022-08-09
Payer: MEDICARE

## 2022-08-11 ENCOUNTER — HOSPITAL ENCOUNTER (OUTPATIENT)
Dept: PHYSICAL THERAPY | Age: 52
Setting detail: THERAPIES SERIES
Discharge: HOME | End: 2022-08-11
Payer: MEDICARE

## 2022-08-11 PROCEDURE — 97110 THERAPEUTIC EXERCISES: CPT

## 2022-08-11 PROCEDURE — 97112 NEUROMUSCULAR REEDUCATION: CPT

## 2022-08-11 NOTE — FLOWSHEET NOTE
723 [facility], [address], [address] [place] [address] [address]  Phone: [phone]   Fax:     [phone]      Physical Therapy Treatment Note/ Progress Report:       Date:  2022    Patient Name:  Kelsy Mendiola    :  1970  MRN: [id number]  Restrictions/Precautions:    Medical/Treatment Diagnosis Information:  Diagnosis: M47.816 (ICD-10-CM) - Lumbar spondylosis  Treatment Diagnosis: LBP with movement dysfunction  Insurance/Certification information:  PT Insurance Information: [place]  Physician Information:   Dr. Julio Rizvi  Has the plan of care been signed (Y/N):        []  Yes  [x]  No     Date of Patient follow up with Physician: RICCOD    Is this a Progress Report:     []  Yes  [x]  No      If Yes:  Date Range for reporting period:  Beginnin22 ------------ Endin22    Progress report will be due (10 Rx or 30 days whichever is less):      Recertification will be due (POC Duration  / 90 days whichever is less): 10/26/22      Visit # Insurance Allowable Auth Required   In Person 4 ? []  Yes     []  No    Tele Health   []  Yes     []  No    Total 4       FOTO Score: 46     Date assessed:  22      Latex Allergy:  [x]NO      []YES  Preferred Language for Healthcare:   [x]English       []other:    Pain level:  5/10     SUBJECTIVE:  Pt reports he is stiff today.       OBJECTIVE:   Observation:   Test measurements:      RESTRICTIONS/PRECAUTIONS: OA    Exercises/Interventions:   Therapeutic Ex (82992) Sets/sec Reps Notes/CUES HEP   Standing hamstring S 20 3     Seated lumbar flex SB 2 10     Mini squats to high table 2 10     Seated march 2 10 4#    LTR 2 10     Beginner bridge 2 10     Standing march 2 10     Tb ext 2 10 BTB    Standing hip abd 2 10     Step up 6\" 1 10     Standing HR 2 10     LAQ 3 10 4#    Pallof press BTB 2 10 painful    Single arm farm carry x2  12#                                Manual Intervention (87359)                                                 NMR re-education (03272)   CUES NEEDED                                                                   Therapeutic Activity (15747)                                          [employer] access code: [email or web address]           Therapeutic Exercise and NMR EXR  [x] (13573) Provided verbal/tactile cueing for activities related to strengthening, flexibility, endurance, ROM  for improvements in proximal hip and core control with self care, mobility, lifting and ambulation.  [] (22732) Provided verbal/tactile cueing for activities related to improving balance, coordination, kinesthetic sense, posture, motor skill, proprioception  to assist with core control in self care, mobility, lifting, and ambulation.      Therapeutic Activities:    [x] (55512 or 63594) Provided verbal/tactile cueing for activities related to improving balance, coordination, kinesthetic sense, posture, motor skill, proprioception and motor activation to allow for proper function  with self care and ADLs  [] (61118) Provided training and instruction to the patient for proper core and proximal hip recruitment and positioning with ambulation re-education     Home Exercise Program:    [x] (18605) Reviewed/Progressed HEP activities related to strengthening, flexibility, endurance, ROM of core, proximal hip and LE for functional self-care, mobility, lifting and ambulation   [] (20224) Reviewed/Progressed HEP activities related to improving balance, coordination, kinesthetic sense, posture, motor skill, proprioception of core, proximal hip and LE for self care, mobility, lifting, and ambulation      Manual Treatments:  PROM / STM / Oscillations-Mobs:  G-I, II, III, IV (PA's, Inf., Post.)  [x] (91024) Provided manual therapy to mobilize proximal hip and LS spine soft tissue/joints for the purpose of modulating pain, promoting relaxation,  increasing ROM, reducing/eliminating soft tissue swelling/inflammation/restriction, improving soft tissue extensibility and allowing for proper ROM for normal function with self care, mobility, lifting and ambulation. Modalities:     [x] GAME READY (VASO)- for significant edema, swelling, pain control. Charges:  Timed Code Treatment Minutes: 30   Total Treatment Minutes:  30   BWC:  TE TIME:  NMR TIME:  MANUAL TIME:  UNTIMED MINUTES:  Medicare Total:   20  10            [] EVAL (LOW) 10028 (typically 20 minutes face-to-face)  [] EVAL (MOD) 25095 (typically 30 minutes face-to-face)  [] EVAL (HIGH) 80011 (typically 45 minutes face-to-face)  [] RE-EVAL     [x] BS(57648) x 1    [] IONTO  [x] NMR (41216) x  1   [] VASO  [] Manual (40414) x     [] Other:  [] TA x      [] Mech Traction (83375)  [] ES(attended) (75791)      [] ES (un) (57786):       ASSESSMENT:  See eval      GOALS:   Patient stated goal: Pt would like to return to pain free recreational activities. Therapist goals for Patient:   Short Term Goals: To be achieved in: 2 weeks  1. Independent in HEP and progression per patient tolerance, in order to prevent re-injury. [x] Progressing: [] Met: [] Not Met: [] Adjusted  2. Patient will have a decrease in pain to facilitate improvement in movement, function, and ADLs as indicated by Functional Deficits. [x] Progressing: [] Met: [] Not Met: [] Adjusted    Long Term Goals: To be achieved in: 12 weeks  1. FOTO score will match or exceed predicted score to assist with reaching prior level of function. [x] Progressing: [] Met: [] Not Met: [] Adjusted  2. Patient will demonstrate increased AROM to WNL, good LS mobility, good hip ROM to allow for proper joint functioning as indicated by patients Functional Deficits. [x] Progressing: [] Met: [] Not Met: [] Adjusted  3. Patient will demonstrate an increase in Strength to good proximal hip and core activation to allow for proper functional mobility as indicated by patients Functional Deficits.    [x] Progressing: [] Met: [] Not Met: [] Adjusted  4. Patient will return to all functional activities without increased symptoms or restriction. [x] Progressing: [] Met: [] Not Met: [] Adjusted  5. Pt will demonstrate the ability to perform all ADL's with 0/10 pain. (patient specific functional goal)    [x] Progressing: [] Met: [] Not Met: [] Adjusted         Overall Progression Towards Functional goals/ Treatment Progress Update:  [] Patient is progressing as expected towards functional goals listed. [] Progression is slowed due to complexities/Impairments listed. [] Progression has been slowed due to co-morbidities. [x] Plan just implemented, too soon to assess goals progression <30days   [] Goals require adjustment due to lack of progress  [] Patient is not progressing as expected and requires additional follow up with physician  [] Other    Prognosis for POC: [x] Good [] Fair  [] Poor      Patient requires continued skilled intervention: [x] Yes  [] No    Treatment/Activity Tolerance:  [x] Patient able to complete treatment  [] Patient limited by fatigue  [] Patient limited by pain    [] Patient limited by other medical complications  [] Other:     Return to Play: (if applicable)   []  Stage 1: Intro to Strength   []  Stage 2: Return to Run and Strength   []  Stage 3: Return to Jump and Strength   []  Stage 4: Dynamic Strength and Agility   []  Stage 5: Sport Specific Training     []  Ready to Return to Play, Meets All Above Stages   []  Not Ready for Return to Sports   Comments:                           PLAN: See eval  [] Continue per plan of care [] Alter current plan (see comments above)  [x] Plan of care initiated [] Hold pending MD visit [] Discharge    Electronically signed by:  Wendy Harris PT    Note: If patient does not return for scheduled/ recommended follow up visits, this note will serve as a discharge from care along with most recent update on progress.

## 2022-08-16 ENCOUNTER — HOSPITAL ENCOUNTER (OUTPATIENT)
Dept: PHYSICAL THERAPY | Age: 52
Setting detail: THERAPIES SERIES
Discharge: HOME | End: 2022-08-16
Payer: MEDICARE

## 2022-08-16 PROCEDURE — 97112 NEUROMUSCULAR REEDUCATION: CPT

## 2022-08-16 PROCEDURE — 97110 THERAPEUTIC EXERCISES: CPT

## 2022-08-16 NOTE — FLOWSHEET NOTE
[facility] [facility], [address] [place] [address], [address] [place] [address] [address]  Phone: [phone]   Fax:     [phone]      Physical Therapy Treatment Note/ Progress Report:       Date:  2022    Patient Name:  Kendrick Seay    :  1970  MRN: [id number]  Restrictions/Precautions:    Medical/Treatment Diagnosis Information:  Diagnosis: M47.816 (ICD-10-CM) - Lumbar spondylosis  Treatment Diagnosis: LBP with movement dysfunction  Insurance/Certification information:  PT Insurance Information: [place]  Physician Information:   Dr. Asha Jacques  Has the plan of care been signed (Y/N):        []  Yes  [x]  No     Date of Patient follow up with Physician: RICCOD    Is this a Progress Report:     []  Yes  [x]  No      If Yes:  Date Range for reporting period:  Beginnin22 ------------ Endin22    Progress report will be due (10 Rx or 30 days whichever is less): 3/44/13     Recertification will be due (POC Duration  / 90 days whichever is less): 10/26/22      Visit # Insurance Allowable Auth Required   In Person 5 ? []  Yes     []  No    Tele Health   []  Yes     []  No    Total 5       FOTO Score: 46     Date assessed:  22      Latex Allergy:  [x]NO      []YES  Preferred Language for Healthcare:   [x]English       []other:    Pain level:  5/10     SUBJECTIVE:  Pt reports he is stiff today.         OBJECTIVE:   Observation:   Test measurements:      RESTRICTIONS/PRECAUTIONS: OA    Exercises/Interventions:   Therapeutic Ex (45048) Sets/sec Reps Notes/CUES HEP   Standing hamstring S 20 3     Seated lumbar flex SB 2 10     Mini squats to high table 2 10     Seated march 2 10 4#    LTR 2 10     Beginner bridge 2 10     Standing march 2 10     Tb ext 2 10 BTB    Standing hip abd 2 10     Step up 6\" 1 10     Standing HR 2 10     LAQ 3 10 4#    Pallof press BTB 2 10 painful    Single arm farm carry x2  15#    CC retro walk 1 10 25#    Seated hip abd 2 15 Coreas TB                  Manual Intervention (29435)                                                 NMR re-education (75869)   CUES NEEDED                                                                   Therapeutic Activity (08491)                                          [employer] access code: [email or web address]           Therapeutic Exercise and NMR EXR  [x] (58038) Provided verbal/tactile cueing for activities related to strengthening, flexibility, endurance, ROM  for improvements in proximal hip and core control with self care, mobility, lifting and ambulation.  [] (80674) Provided verbal/tactile cueing for activities related to improving balance, coordination, kinesthetic sense, posture, motor skill, proprioception  to assist with core control in self care, mobility, lifting, and ambulation.      Therapeutic Activities:    [x] (80054 or 88331) Provided verbal/tactile cueing for activities related to improving balance, coordination, kinesthetic sense, posture, motor skill, proprioception and motor activation to allow for proper function  with self care and ADLs  [] (23019) Provided training and instruction to the patient for proper core and proximal hip recruitment and positioning with ambulation re-education     Home Exercise Program:    [x] (87672) Reviewed/Progressed HEP activities related to strengthening, flexibility, endurance, ROM of core, proximal hip and LE for functional self-care, mobility, lifting and ambulation   [] (36853) Reviewed/Progressed HEP activities related to improving balance, coordination, kinesthetic sense, posture, motor skill, proprioception of core, proximal hip and LE for self care, mobility, lifting, and ambulation      Manual Treatments:  PROM / STM / Oscillations-Mobs:  G-I, II, III, IV (PA's, Inf., Post.)  [x] (39605) Provided manual therapy to mobilize proximal hip and LS spine soft tissue/joints for the purpose of modulating pain, promoting relaxation,  increasing ROM, reducing/eliminating soft tissue swelling/inflammation/restriction, improving soft tissue extensibility and allowing for proper ROM for normal function with self care, mobility, lifting and ambulation. Modalities:     [x] GAME READY (VASO)- for significant edema, swelling, pain control. Charges:  Timed Code Treatment Minutes: 38   Total Treatment Minutes:  38   BWC:  TE TIME:  NMR TIME:  MANUAL TIME:  UNTIMED MINUTES:  Medicare Total:   28  10            [] EVAL (LOW) 27848 (typically 20 minutes face-to-face)  [] EVAL (MOD) 46902 (typically 30 minutes face-to-face)  [] EVAL (HIGH) 58477 (typically 45 minutes face-to-face)  [] RE-EVAL     [x] OH(65434) x 2    [] IONTO  [x] NMR (75537) x  1   [] VASO  [] Manual (59094) x     [] Other:  [] TA x      [] Mech Traction (39117)  [] ES(attended) (51623)      [] ES (un) (53856):       ASSESSMENT:  See eval      GOALS:   Patient stated goal: Pt would like to return to pain free recreational activities. Therapist goals for Patient:   Short Term Goals: To be achieved in: 2 weeks  1. Independent in HEP and progression per patient tolerance, in order to prevent re-injury. [x] Progressing: [] Met: [] Not Met: [] Adjusted  2. Patient will have a decrease in pain to facilitate improvement in movement, function, and ADLs as indicated by Functional Deficits. [x] Progressing: [] Met: [] Not Met: [] Adjusted    Long Term Goals: To be achieved in: 12 weeks  1. FOTO score will match or exceed predicted score to assist with reaching prior level of function. [x] Progressing: [] Met: [] Not Met: [] Adjusted  2. Patient will demonstrate increased AROM to WNL, good LS mobility, good hip ROM to allow for proper joint functioning as indicated by patients Functional Deficits. [x] Progressing: [] Met: [] Not Met: [] Adjusted  3.  Patient will demonstrate an increase in Strength to good proximal hip and core activation to allow for proper functional mobility as indicated by patients Functional Deficits. [x] Progressing: [] Met: [] Not Met: [] Adjusted  4. Patient will return to all functional activities without increased symptoms or restriction. [x] Progressing: [] Met: [] Not Met: [] Adjusted  5. Pt will demonstrate the ability to perform all ADL's with 0/10 pain. (patient specific functional goal)    [x] Progressing: [] Met: [] Not Met: [] Adjusted         Overall Progression Towards Functional goals/ Treatment Progress Update:  [] Patient is progressing as expected towards functional goals listed. [] Progression is slowed due to complexities/Impairments listed. [] Progression has been slowed due to co-morbidities. [x] Plan just implemented, too soon to assess goals progression <30days   [] Goals require adjustment due to lack of progress  [] Patient is not progressing as expected and requires additional follow up with physician  [] Other    Prognosis for POC: [x] Good [] Fair  [] Poor      Patient requires continued skilled intervention: [x] Yes  [] No    Treatment/Activity Tolerance:  [x] Patient able to complete treatment  [] Patient limited by fatigue  [] Patient limited by pain    [] Patient limited by other medical complications  [] Other:     Return to Play: (if applicable)   []  Stage 1: Intro to Strength   []  Stage 2: Return to Run and Strength   []  Stage 3: Return to Jump and Strength   []  Stage 4: Dynamic Strength and Agility   []  Stage 5: Sport Specific Training     []  Ready to Return to Play, Meets All Above Stages   []  Not Ready for Return to Sports   Comments:                           PLAN: See eval  [] Continue per plan of care [] Alter current plan (see comments above)  [x] Plan of care initiated [] Hold pending MD visit [] Discharge    Electronically signed by:  Ayse Ware PT    Note: If patient does not return for scheduled/ recommended follow up visits, this note will serve as a discharge from care along with most recent update on progress.

## 2022-08-18 ENCOUNTER — HOSPITAL ENCOUNTER (OUTPATIENT)
Dept: PHYSICAL THERAPY | Age: 52
Setting detail: THERAPIES SERIES
Discharge: HOME | End: 2022-08-18
Payer: MEDICARE

## 2022-08-18 NOTE — FLOWSHEET NOTE
[address] [facility], [address] [place] [address], [address] [place] [address] [address]  Phone: [phone]   Fax:     [phone]    Physical Therapy  Cancellation/No-show Note  Patient Name:  Titi Orosco  :  1970   Date:  2022    Cancelled visits to date: 2  No-shows to date: 0    For today's appointment patient:  [x]  Cancelled  []  Rescheduled appointment  []  No-show     Reason given by patient:  []  Patient ill  []  Conflicting appointment  []  No transportation    []  Conflict with work  []  No reason given  [x]  Other:     Comments:  finances    Phone call information:   []  Phone call made today to patient at am/pm at the number provided:      []  Patient answered, conversation as follows:    []  Patient did not answer, message left as follows:  [x]  Phone call not needed - pt contacted us to cancel and provided reason for cancellation.      Electronically signed by:  Pierre Girard PT, PT

## 2022-08-23 ENCOUNTER — HOSPITAL ENCOUNTER (OUTPATIENT)
Dept: PHYSICAL THERAPY | Age: 52
Setting detail: THERAPIES SERIES
Discharge: HOME | End: 2022-08-23
Payer: MEDICARE

## 2022-08-23 NOTE — FLOWSHEET NOTE
[address] [facility], [address] [place] [address], [address] [place] [address] [address]  Phone: [phone]   Fax:     [phone]    Physical Therapy  Cancellation/No-show Note  Patient Name:  Sancho Jama  :  1970   Date:  2022    Cancelled visits to date: 2  No-shows to date: 1    For today's appointment patient:  []  Cancelled  []  Rescheduled appointment  [x]  No-show     Reason given by patient:  []  Patient ill  []  Conflicting appointment  []  No transportation    []  Conflict with work  []  No reason given  [x]  Other:     Comments: finances      Phone call information:   [x]  Phone call made today to patient at am/pm at the number provided:      []  Patient answered, conversation as follows:    []  Patient did not answer, message left as follows:  []  Phone call not needed - pt contacted us to cancel and provided reason for cancellation.      Electronically signed by:  Efe Khan, PT, PT

## 2022-08-25 ENCOUNTER — APPOINTMENT (OUTPATIENT)
Dept: PHYSICAL THERAPY | Age: 52
End: 2022-08-25
Payer: MEDICARE

## 2022-08-30 ENCOUNTER — APPOINTMENT (OUTPATIENT)
Dept: PHYSICAL THERAPY | Age: 52
End: 2022-08-30
Payer: MEDICARE

## 2023-03-06 ENCOUNTER — HOSPITAL ENCOUNTER (OUTPATIENT)
Age: 53
Discharge: HOME | End: 2023-03-06
Payer: MEDICAID

## 2023-03-06 ENCOUNTER — HOSPITAL ENCOUNTER (OUTPATIENT)
Dept: GENERAL RADIOLOGY | Age: 53
Discharge: HOME | End: 2023-03-06
Payer: MEDICAID

## 2023-03-06 DIAGNOSIS — M51.35 DDD (DEGENERATIVE DISC DISEASE), THORACOLUMBAR: ICD-10-CM

## 2023-03-06 PROCEDURE — 72110 X-RAY EXAM L-2 SPINE 4/>VWS: CPT

## 2023-05-26 ENCOUNTER — HOSPITAL ENCOUNTER (OUTPATIENT)
Dept: MRI IMAGING | Age: 53
Discharge: HOME | End: 2023-05-26

## 2023-05-26 DIAGNOSIS — M51.36 DEGENERATION OF LUMBAR INTERVERTEBRAL DISC: ICD-10-CM

## 2024-01-17 ENCOUNTER — APPOINTMENT (OUTPATIENT)
Dept: GENERAL RADIOLOGY | Age: 54
End: 2024-01-17
Payer: COMMERCIAL

## 2024-01-17 ENCOUNTER — HOSPITAL ENCOUNTER (EMERGENCY)
Age: 54
Discharge: HOME | End: 2024-01-17
Payer: COMMERCIAL

## 2024-01-17 VITALS
RESPIRATION RATE: 18 BRPM | HEART RATE: 85 BPM | BODY MASS INDEX: 45.1 KG/M2 | TEMPERATURE: 97.1 F | DIASTOLIC BLOOD PRESSURE: 71 MMHG | SYSTOLIC BLOOD PRESSURE: 122 MMHG | HEIGHT: 70 IN | OXYGEN SATURATION: 95 % | WEIGHT: 315 LBS

## 2024-01-17 DIAGNOSIS — G89.29 ACUTE EXACERBATION OF CHRONIC LOW BACK PAIN: Primary | ICD-10-CM

## 2024-01-17 DIAGNOSIS — M54.50 ACUTE EXACERBATION OF CHRONIC LOW BACK PAIN: Primary | ICD-10-CM

## 2024-01-17 PROCEDURE — 6370000000 HC RX 637 (ALT 250 FOR IP): Performed by: REGISTERED NURSE

## 2024-01-17 PROCEDURE — 72100 X-RAY EXAM L-S SPINE 2/3 VWS: CPT

## 2024-01-17 PROCEDURE — 6360000002 HC RX W HCPCS: Performed by: REGISTERED NURSE

## 2024-01-17 PROCEDURE — 99284 EMERGENCY DEPT VISIT MOD MDM: CPT

## 2024-01-17 PROCEDURE — 96372 THER/PROPH/DIAG INJ SC/IM: CPT

## 2024-01-17 RX ORDER — OXYCODONE HYDROCHLORIDE AND ACETAMINOPHEN 5; 325 MG/1; MG/1
1 TABLET ORAL ONCE
Status: COMPLETED | OUTPATIENT
Start: 2024-01-17 | End: 2024-01-17

## 2024-01-17 RX ORDER — LIDOCAINE 4 G/G
1 PATCH TOPICAL ONCE
Status: DISCONTINUED | OUTPATIENT
Start: 2024-01-17 | End: 2024-01-17 | Stop reason: HOSPADM

## 2024-01-17 RX ORDER — LIDOCAINE 4 G/G
1 PATCH TOPICAL EVERY 24 HOURS
Qty: 30 PATCH | Refills: 0 | Status: SHIPPED | OUTPATIENT
Start: 2024-01-17 | End: 2024-02-16

## 2024-01-17 RX ORDER — KETOROLAC TROMETHAMINE 15 MG/ML
30 INJECTION, SOLUTION INTRAMUSCULAR; INTRAVENOUS ONCE
Status: DISCONTINUED | OUTPATIENT
Start: 2024-01-17 | End: 2024-01-17

## 2024-01-17 RX ORDER — KETOROLAC TROMETHAMINE 15 MG/ML
30 INJECTION, SOLUTION INTRAMUSCULAR; INTRAVENOUS ONCE
Status: COMPLETED | OUTPATIENT
Start: 2024-01-17 | End: 2024-01-17

## 2024-01-17 RX ORDER — METHOCARBAMOL 500 MG/1
750 TABLET, FILM COATED ORAL 4 TIMES DAILY PRN
Qty: 20 TABLET | Refills: 0 | Status: SHIPPED | OUTPATIENT
Start: 2024-01-17 | End: 2024-01-22

## 2024-01-17 RX ORDER — ORPHENADRINE CITRATE 30 MG/ML
60 INJECTION INTRAMUSCULAR; INTRAVENOUS ONCE
Status: COMPLETED | OUTPATIENT
Start: 2024-01-17 | End: 2024-01-17

## 2024-01-17 RX ADMIN — ORPHENADRINE CITRATE 60 MG: 30 INJECTION INTRAMUSCULAR; INTRAVENOUS at 18:24

## 2024-01-17 RX ADMIN — OXYCODONE AND ACETAMINOPHEN 1 TABLET: 5; 325 TABLET ORAL at 18:09

## 2024-01-17 RX ADMIN — KETOROLAC TROMETHAMINE 30 MG: 15 INJECTION, SOLUTION INTRAMUSCULAR; INTRAVENOUS at 18:24

## 2024-01-17 ASSESSMENT — LIFESTYLE VARIABLES
HOW OFTEN DO YOU HAVE A DRINK CONTAINING ALCOHOL: NEVER
HOW MANY STANDARD DRINKS CONTAINING ALCOHOL DO YOU HAVE ON A TYPICAL DAY: PATIENT DOES NOT DRINK

## 2024-01-17 ASSESSMENT — PAIN - FUNCTIONAL ASSESSMENT: PAIN_FUNCTIONAL_ASSESSMENT: 0-10

## 2024-01-17 ASSESSMENT — PAIN DESCRIPTION - LOCATION: LOCATION: BACK

## 2024-01-17 ASSESSMENT — ENCOUNTER SYMPTOMS
CHEST TIGHTNESS: 0
SHORTNESS OF BREATH: 0
BACK PAIN: 1
ABDOMINAL PAIN: 0

## 2024-01-17 ASSESSMENT — PAIN DESCRIPTION - ORIENTATION: ORIENTATION: LOWER;MID

## 2024-01-17 ASSESSMENT — PAIN SCALES - GENERAL
PAINLEVEL_OUTOF10: 10
PAINLEVEL_OUTOF10: 10

## 2024-01-18 NOTE — ED PROVIDER NOTES
effects associated with it and he does not feel that it helps greatly with his discomfort.  I did offer changing the muscle relaxant from tizanidine to Robaxin and he was in agreement with trying this.  He is also not been taking any other oral pain medication such as Tylenol or ibuprofen in conjunction with the muscle relaxants.  I did advise him to stop all tizanidine.  I provided him with prescriptions for lidocaine patches as well as Robaxin.  He did receive a prescription for naproxen through his PCP as well but states he has not been taking it because he did not think that it would help, I discussed with him that anti-inflammatories should help greatly with back pain and they would work in conjunction with his other medications and he verbalized understanding of this.  He was given strict return precautions for the emergency department including but not limited to worsening pain, perineal paresthesia, loss of bowel or bladder control, fevers chills, inability to bear weight or any other new or worsening symptoms.  He was ultimately discharged in stable condition with questions answered.    Social Determinants Significantly Affecting Health : Patient has significant healthcare illiteracy    Is this patient to be included in the SEP-1 Core Measure due to severe sepsis or septic shock?   No   Exclusion criteria - the patient is NOT to be included for SEP-1 Core Measure due to:  Infection is not suspected    Discharge Time out:  CC Reviewed Yes   Test Results Yes     Vitals:    01/17/24 1915   BP: 122/71   Pulse: 85   Resp: 18   Temp:    SpO2: 95%              FINAL IMPRESSION      1. Acute exacerbation of chronic low back pain          DISPOSITION/PLAN   DISPOSITION Decision To Discharge 01/17/2024 07:10:29 PM      PATIENT REFERREDTO:  Ashia Davis PA-C  [address] [place] [address]  [phone]    In 2 days  Re-evaluation    [facility] ED  [facility]

## 2024-01-18 NOTE — DISCHARGE INSTRUCTIONS
Take the naproxen that you are already prescribed.  In addition you can add Tylenol as well as lidocaine patches.  You can also utilize the Robaxin.  While taking the Robaxin do not take any tizanidine as it is a similar medication.  Please follow-up with orthopedic spine or discussed pain management options with your primary care physician.  Return to the nearest emergency department for any new or worsening symptoms.   none

## 2024-01-22 ENCOUNTER — HOSPITAL ENCOUNTER (EMERGENCY)
Age: 54
Discharge: HOME | End: 2024-01-22
Payer: COMMERCIAL

## 2024-01-22 VITALS
DIASTOLIC BLOOD PRESSURE: 71 MMHG | OXYGEN SATURATION: 99 % | SYSTOLIC BLOOD PRESSURE: 130 MMHG | RESPIRATION RATE: 14 BRPM | HEART RATE: 89 BPM

## 2024-01-22 DIAGNOSIS — S39.012A STRAIN OF LUMBAR REGION, INITIAL ENCOUNTER: Primary | ICD-10-CM

## 2024-01-22 PROCEDURE — 6370000000 HC RX 637 (ALT 250 FOR IP): Performed by: PHYSICIAN ASSISTANT

## 2024-01-22 PROCEDURE — 99284 EMERGENCY DEPT VISIT MOD MDM: CPT

## 2024-01-22 PROCEDURE — 96372 THER/PROPH/DIAG INJ SC/IM: CPT

## 2024-01-22 PROCEDURE — 6360000002 HC RX W HCPCS: Performed by: PHYSICIAN ASSISTANT

## 2024-01-22 RX ORDER — LIDOCAINE 4 G/G
1 PATCH TOPICAL ONCE
Status: DISCONTINUED | OUTPATIENT
Start: 2024-01-22 | End: 2024-01-22 | Stop reason: HOSPADM

## 2024-01-22 RX ORDER — KETOROLAC TROMETHAMINE 15 MG/ML
30 INJECTION, SOLUTION INTRAMUSCULAR; INTRAVENOUS ONCE
Status: COMPLETED | OUTPATIENT
Start: 2024-01-22 | End: 2024-01-22

## 2024-01-22 RX ADMIN — KETOROLAC TROMETHAMINE 30 MG: 15 INJECTION, SOLUTION INTRAMUSCULAR; INTRAVENOUS at 17:08

## 2024-01-22 ASSESSMENT — PAIN - FUNCTIONAL ASSESSMENT: PAIN_FUNCTIONAL_ASSESSMENT: 0-10

## 2024-01-22 ASSESSMENT — LIFESTYLE VARIABLES
HOW MANY STANDARD DRINKS CONTAINING ALCOHOL DO YOU HAVE ON A TYPICAL DAY: PATIENT DOES NOT DRINK
HOW OFTEN DO YOU HAVE A DRINK CONTAINING ALCOHOL: NEVER

## 2024-01-22 ASSESSMENT — PAIN DESCRIPTION - LOCATION: LOCATION: BACK

## 2024-01-22 ASSESSMENT — PAIN SCALES - GENERAL: PAINLEVEL_OUTOF10: 10

## 2024-01-22 ASSESSMENT — PAIN DESCRIPTION - ORIENTATION: ORIENTATION: LOWER

## 2024-01-23 NOTE — ED PROVIDER NOTES
[facility] ED  EMERGENCY DEPARTMENT ENCOUNTER        Pt Name: Francois Louis  MRN: [id number]  Birthdate 1970  Date of evaluation: 1/22/2024  Provider: Diana Willis PA-C  PCP: Ashia Davis PA-C  Note Started: 7:58 PM EST 1/22/24      DORIS. I have evaluated this patient.        CHIEF COMPLAINT       Chief Complaint   Patient presents with    Back Pain     10/10 back pain. Patient states pain just started hurting again, says it happens every once and while       HISTORY OF PRESENT ILLNESS: 1 or more Elements     History From: Patient            Chief Complaint: Back pain    Francois Louis is a 53 y.o. male who presents with atraumatic left-sided low back pain that is started over the past couple of days.  He denies any preceding injury.  He states that it is a constant pain nonradiating worse when he gets up and moves around or moves in any direction.  He denies pain down his legs, numbness tingling, weakness in his legs, saddle anesthesia bowel or bladder incontinence fever history of IV drug use.  He states he was seen a couple years ago for his back and had an MRI.  He recently was referred to a pain specialist.  He could not do physical therapy due to his pain but he has been in physical therapy in the past for his back.  He denies any urinary complaints abdominal pain chest pain.  He is taking muscle relaxer and anti-inflammatories.    Nursing Notes were all reviewed and agreed with or any disagreements were addressed in the HPI.    REVIEW OF SYSTEMS :      Review of Systems    Positives and Pertinent negatives as per HPI.     SURGICAL HISTORY     Past Surgical History:   Procedure Laterality Date    CHOLECYSTECTOMY         CURRENTMEDICATIONS       Discharge Medication List as of 1/22/2024  5:12 PM        CONTINUE these medications which have NOT CHANGED    Details   lidocaine 4 % external patch Place 1 patch onto the skin every 24 hours Place 1 patch onto the skin daily 12 hours on, 12

## 2024-04-22 ENCOUNTER — APPOINTMENT (OUTPATIENT)
Dept: GENERAL RADIOLOGY | Age: 54
End: 2024-04-22
Payer: COMMERCIAL

## 2024-04-22 ENCOUNTER — HOSPITAL ENCOUNTER (EMERGENCY)
Age: 54
Discharge: HOME | End: 2024-04-22
Attending: EMERGENCY MEDICINE
Payer: COMMERCIAL

## 2024-04-22 VITALS
SYSTOLIC BLOOD PRESSURE: 168 MMHG | HEART RATE: 110 BPM | BODY MASS INDEX: 45.1 KG/M2 | HEIGHT: 70 IN | WEIGHT: 315 LBS | RESPIRATION RATE: 18 BRPM | TEMPERATURE: 98 F | OXYGEN SATURATION: 94 % | DIASTOLIC BLOOD PRESSURE: 93 MMHG

## 2024-04-22 DIAGNOSIS — M25.552 ACUTE PAIN OF BOTH HIPS: Primary | ICD-10-CM

## 2024-04-22 DIAGNOSIS — M25.551 ACUTE PAIN OF BOTH HIPS: Primary | ICD-10-CM

## 2024-04-22 PROCEDURE — 72100 X-RAY EXAM L-S SPINE 2/3 VWS: CPT

## 2024-04-22 PROCEDURE — 99283 EMERGENCY DEPT VISIT LOW MDM: CPT

## 2024-04-22 PROCEDURE — 73521 X-RAY EXAM HIPS BI 2 VIEWS: CPT

## 2024-04-22 RX ORDER — METFORMIN HYDROCHLORIDE 500 MG/1
TABLET, EXTENDED RELEASE ORAL
COMMUNITY
Start: 2024-01-11

## 2024-04-22 RX ORDER — TIZANIDINE 2 MG/1
TABLET ORAL
COMMUNITY
Start: 2024-01-16

## 2024-04-22 RX ORDER — LISINOPRIL 10 MG/1
1 TABLET ORAL DAILY
COMMUNITY
Start: 2024-01-02

## 2024-04-22 RX ORDER — ACETAMINOPHEN 500 MG
500 TABLET ORAL EVERY 4 HOURS PRN
Qty: 30 TABLET | Refills: 5 | Status: SHIPPED | OUTPATIENT
Start: 2024-04-22

## 2024-04-22 RX ORDER — IBUPROFEN 600 MG/1
600 TABLET ORAL EVERY 6 HOURS PRN
Qty: 20 TABLET | Refills: 0 | Status: SHIPPED | OUTPATIENT
Start: 2024-04-22

## 2024-04-22 ASSESSMENT — PAIN - FUNCTIONAL ASSESSMENT: PAIN_FUNCTIONAL_ASSESSMENT: 0-10

## 2024-04-22 ASSESSMENT — PAIN SCALES - GENERAL: PAINLEVEL_OUTOF10: 3

## 2024-04-22 ASSESSMENT — ENCOUNTER SYMPTOMS: BACK PAIN: 1

## 2024-04-22 NOTE — ED PROVIDER NOTES
by the radiologist. Plain radiographic images are visualized and preliminarily interpreted by the emergency physician with the below findings:        Interpretation per the Radiologist below, if available at the time of this note:    XR HIP BILATERAL W AP PELVIS (2 VIEWS)   Final Result   No acute findings         XR LUMBAR SPINE (2-3 VIEWS)   Preliminary Result   No acute osseous abnormality.      Stable multilevel degenerative disc disease.                 LABS:  No results found for this visit on 04/22/24.         EMERGENCY DEPARTMENT COURSE and DIFFERENTIAL DIAGNOSIS/MDM:     Vitals:    04/22/24 1227   BP: (!) 168/93   Pulse: (!) 110   Resp: 18   Temp: 98 °F (36.7 °C)   TempSrc: Oral   SpO2: 94%   Weight: (!) 165 kg (363 lb 11.2 oz)   Height: 1.778 m (5' 10\")           MDM  Historian: Patient himself  Limitations: Patient is somewhat disabled from his mental illness  History of present illness patient presents with 1 to 2-week history of lower back pain in both hips hurting he weighs 363 pounds he has never seen anybody for these denies any acute trauma denies fever chills or red flags including incontinence of urine or stool  On physical exam he has mild to moderate tenderness he is noted to be overweight abdomen is obese but soft patient has no signs of shingles or rashes  Patient at this point reflexes motor strength sensory exam is normal logrolling his hip there is no severe limitation but he does complain of pain with movement of both hips  We are x-raying both hips and his back will make disposition arrangements with I think will be very important as well as prescriptions will be prescribed  X-rays were reviewed which revealed some degenerative changes but no acute fracture nothing life-threatening his findings can be worked up as further as an outpatient with therapy to begin by the primary care team Dr. Ashia Davis    REASSESSMENT          CRITICAL CARE TIME     CONSULTS:  None      PROCEDURES:

## 2024-04-22 NOTE — DISCHARGE INSTRUCTIONS
Tylenol 650 mg every 4 hours  Take ibuprofen 600 mg 3 times a day  Follow-up with Ashia Davis for further instructions on workup and therapy

## 2024-06-18 ENCOUNTER — HOSPITAL ENCOUNTER (EMERGENCY)
Age: 54
Discharge: HOME | End: 2024-06-18
Attending: EMERGENCY MEDICINE
Payer: COMMERCIAL

## 2024-06-18 VITALS
TEMPERATURE: 98.6 F | BODY MASS INDEX: 46.65 KG/M2 | DIASTOLIC BLOOD PRESSURE: 102 MMHG | WEIGHT: 315 LBS | SYSTOLIC BLOOD PRESSURE: 145 MMHG | HEIGHT: 69 IN | OXYGEN SATURATION: 94 % | HEART RATE: 99 BPM | RESPIRATION RATE: 20 BRPM

## 2024-06-18 DIAGNOSIS — S39.012A STRAIN OF LUMBAR REGION, INITIAL ENCOUNTER: Primary | ICD-10-CM

## 2024-06-18 PROCEDURE — 6360000002 HC RX W HCPCS: Performed by: EMERGENCY MEDICINE

## 2024-06-18 PROCEDURE — 6370000000 HC RX 637 (ALT 250 FOR IP): Performed by: EMERGENCY MEDICINE

## 2024-06-18 PROCEDURE — 99284 EMERGENCY DEPT VISIT MOD MDM: CPT

## 2024-06-18 PROCEDURE — 96372 THER/PROPH/DIAG INJ SC/IM: CPT

## 2024-06-18 RX ORDER — KETOROLAC TROMETHAMINE 15 MG/ML
15 INJECTION, SOLUTION INTRAMUSCULAR; INTRAVENOUS ONCE
Status: DISCONTINUED | OUTPATIENT
Start: 2024-06-18 | End: 2024-06-18

## 2024-06-18 RX ORDER — METHOCARBAMOL 750 MG/1
750 TABLET, FILM COATED ORAL 4 TIMES DAILY
Qty: 40 TABLET | Refills: 0 | Status: SHIPPED | OUTPATIENT
Start: 2024-06-18 | End: 2024-06-28

## 2024-06-18 RX ORDER — BENZONATATE 100 MG/1
100 CAPSULE ORAL ONCE
Status: COMPLETED | OUTPATIENT
Start: 2024-06-18 | End: 2024-06-18

## 2024-06-18 RX ORDER — LIDOCAINE 4 G/G
1 PATCH TOPICAL ONCE
Status: DISCONTINUED | OUTPATIENT
Start: 2024-06-18 | End: 2024-06-19 | Stop reason: HOSPADM

## 2024-06-18 RX ORDER — MELOXICAM 7.5 MG/1
7.5 TABLET ORAL DAILY
Qty: 90 TABLET | Refills: 1 | Status: SHIPPED | OUTPATIENT
Start: 2024-06-18

## 2024-06-18 RX ORDER — ORPHENADRINE CITRATE 30 MG/ML
60 INJECTION INTRAMUSCULAR; INTRAVENOUS ONCE
Status: COMPLETED | OUTPATIENT
Start: 2024-06-18 | End: 2024-06-18

## 2024-06-18 RX ORDER — LIDOCAINE 4 G/G
1 PATCH TOPICAL DAILY
Qty: 30 PATCH | Refills: 0 | Status: SHIPPED | OUTPATIENT
Start: 2024-06-18 | End: 2024-07-18

## 2024-06-18 RX ORDER — KETOROLAC TROMETHAMINE 15 MG/ML
15 INJECTION, SOLUTION INTRAMUSCULAR; INTRAVENOUS ONCE
Status: COMPLETED | OUTPATIENT
Start: 2024-06-18 | End: 2024-06-18

## 2024-06-18 RX ADMIN — KETOROLAC TROMETHAMINE 15 MG: 15 INJECTION, SOLUTION INTRAMUSCULAR; INTRAVENOUS at 22:43

## 2024-06-18 RX ADMIN — BENZONATATE 100 MG: 100 CAPSULE ORAL at 22:41

## 2024-06-18 RX ADMIN — ORPHENADRINE CITRATE 60 MG: 30 INJECTION, SOLUTION INTRAMUSCULAR; INTRAVENOUS at 22:41

## 2024-06-21 NOTE — ED PROVIDER NOTES
Hyperlipidemia     IBS (irritable bowel syndrome) 3/21/2013    Low back pain 6/30/2020    MDD (major depressive disorder) 10/28/2013    Migraine syndrome 3/21/2013    Morbid obesity (HCC)     Pneumonia     Tobacco abuse          Records Reviewed (External and source): Reviewed patient's previous ED visits.     Disposition Considerations (include 1 Tests not done, Admit vs D/C, Shared Decision Making, Pt Expectation of Test or Tx.): Consider getting imaging studies but patient has no midline tenderness or focal neurological deficits.  He denies a history of trauma.    Admission to the hospital considered but patient's symptoms are improving.  Vital signs and testing performed is reassuring.  Based on this patient is appropriate for outpatient management.  No indication for admission at this time.     Symptomatic treatment with expectant management discussed with the patient and/or family member or surrogates present and they are amenable to treatment plan and outpatient follow-up.  Strict return precautions were discussed with the patient and those present.  All parties involved were informed that condition may persist or worsen in which case they may then require inpatient treatment, currently there is no indication.  They demonstrated understanding of when to return to the emergency department for new or worsening symptoms..       I am the Primary Clinician of Record.    FINAL IMPRESSION      1. Strain of lumbar region, initial encounter          DISPOSITION/PLAN     DISPOSITION Decision To Discharge 06/18/2024 10:36:47 PM      PATIENT REFERRED TO:  Ashia Davis PA-C  [address] [place] [address]  [phone]    Schedule an appointment as soon as possible for a visit   As needed      DISCHARGE MEDICATIONS:  Discharge Medication List as of 6/18/2024 10:49 PM        START taking these medications    Details   meloxicam (MOBIC) 7.5 MG tablet Take 1 tablet by mouth daily, Disp-90 tablet, R-1Normal

## 2024-08-26 ENCOUNTER — HOSPITAL ENCOUNTER (EMERGENCY)
Age: 54
Discharge: HOME | End: 2024-08-26
Payer: COMMERCIAL

## 2024-08-26 VITALS
OXYGEN SATURATION: 90 % | HEIGHT: 69 IN | HEART RATE: 85 BPM | RESPIRATION RATE: 18 BRPM | SYSTOLIC BLOOD PRESSURE: 142 MMHG | TEMPERATURE: 97.7 F | DIASTOLIC BLOOD PRESSURE: 90 MMHG | BODY MASS INDEX: 46.65 KG/M2 | WEIGHT: 315 LBS

## 2024-08-26 DIAGNOSIS — M25.552 LEFT HIP PAIN: Primary | ICD-10-CM

## 2024-08-26 PROCEDURE — 99284 EMERGENCY DEPT VISIT MOD MDM: CPT

## 2024-08-26 PROCEDURE — 6360000002 HC RX W HCPCS

## 2024-08-26 PROCEDURE — 96372 THER/PROPH/DIAG INJ SC/IM: CPT

## 2024-08-26 RX ORDER — KETOROLAC TROMETHAMINE 15 MG/ML
15 INJECTION, SOLUTION INTRAMUSCULAR; INTRAVENOUS ONCE
Status: COMPLETED | OUTPATIENT
Start: 2024-08-26 | End: 2024-08-26

## 2024-08-26 RX ORDER — METHOCARBAMOL 750 MG/1
750 TABLET, FILM COATED ORAL 4 TIMES DAILY
Qty: 40 TABLET | Refills: 0 | Status: SHIPPED | OUTPATIENT
Start: 2024-08-26 | End: 2024-09-05

## 2024-08-26 RX ADMIN — KETOROLAC TROMETHAMINE 15 MG: 15 INJECTION, SOLUTION INTRAMUSCULAR; INTRAVENOUS at 09:58

## 2024-08-26 NOTE — DISCHARGE INSTRUCTIONS
Clinically stable. Continue current treatment. Unable to take aspirin.   Please follow-up with your pain management provider.  Continue alternating ibuprofen and Tylenol for pain relief.  Prescription was also sent in for Robaxin which is a muscle relaxer.  Discontinue taking your tizanidine.

## 2024-08-26 NOTE — ED PROVIDER NOTES
[facility] ED  EMERGENCY DEPARTMENT ENCOUNTER        Pt Name: Francois Louis  MRN: [id number]  Birthdate 1970  Date of evaluation: 8/26/2024  Provider: AURELIANO Venegas  PCP: Ashia Davis PA-C  Note Started: 9:10 AM EDT 8/26/24      DORIS. I have evaluated this patient.        CHIEF COMPLAINT       Chief Complaint   Patient presents with    Hip Pain     LEFT HIP PAIN.  NO INJURY.  HAVING TROUBLE SLEEPING DUE TO THE PAIN.  HAD AN INJECTION ON FRIDAY / STATES IT IS NOT HELPING.       HISTORY OF PRESENT ILLNESS: 1 or more Elements     History From: Patient    Limitations to history : None    Social Determinants Significantly Affecting Health : None    Chief Complaint: Left hip pain    Francois Louis is a 54 y.o. male with a past medical history of morbid obesity and osteoarthritis of hips who presents for left hip pain.  Patient states this has been going on for several months.  States that he had a injection in his back last Friday and that it did not help to alleviate his pain.  He had called his pain management provider this morning who sent him here to be evaluated.  The pain is an ache but does not radiate.  Denies any injuries or changes in activity recently.  Denies headache, fever, N\V, trouble urinating, loss of bowel or bladder.  Denies shortness of breath or chest pain.  He has been taking ibuprofen, Tylenol, and has tried meloxicam without relief.  States he is concerned because the pain keeps him up at night.  Patient is wanting a medication he can take at night to help with the pain.    Nursing Notes were all reviewed and agreed with or any disagreements were addressed in the HPI.    REVIEW OF SYSTEMS :      Review of Systems    Positives and Pertinent negatives as per HPI.     SURGICAL HISTORY     Past Surgical History:   Procedure Laterality Date    CHOLECYSTECTOMY         CURRENTMEDICATIONS       Discharge Medication List as of 8/26/2024 10:00 AM        CONTINUE these          EMERGENCY DEPARTMENT COURSE and DIFFERENTIAL DIAGNOSIS/MDM:   Vitals:    Vitals:    08/26/24 0923 08/26/24 0928   BP: (!) 142/90    Pulse: 85    Resp: 18    Temp: 97.7 °F (36.5 °C)    TempSrc: Oral    SpO2: (!) 89% 90%   Weight: (!) 163.3 kg (360 lb)    Height: 1.753 m (5' 9\")        Patient was given the following medications:  Medications   ketorolac (TORADOL) injection 15 mg (15 mg IntraMUSCular Given 8/26/24 0958)             Is this patient to be included in the SEP-1 Core Measure due to severe sepsis or septic shock?   No   Exclusion criteria - the patient is NOT to be included for SEP-1 Core Measure due to:  Infection is not suspected      Chronic Conditions affecting care:    has a past medical history of Anxiety, Bilateral primary osteoarthritis of hip (6/30/2020), Bipolar 1 disorder, manic, mild (HCC) (11/8/2016), COPD exacerbation (HCC) (6/7/2020), Gastroesophageal reflux disease without esophagitis (6/3/2016), HTN (hypertension) (8/7/2015), Hyperlipidemia, IBS (irritable bowel syndrome) (3/21/2013), Low back pain (6/30/2020), MDD (major depressive disorder) (10/28/2013), Migraine syndrome (3/21/2013), Morbid obesity ([facility]), Pneumonia, and Tobacco abuse.    CONSULTS: (Who and What was discussed)  None      Records Reviewed (External and Source)   Telephone encounter w/ pain management from today: patient had severe pain today and was sent to ED  XR bilateral hip 4/22/2024: Hip joint spaces are grossly preserved.  No osteonecrosis.  CC/HPI Summary, DDx, ED Course, and Reassessment:   This is a 54-year-old female with past medical history of obesity and osteoarthritis of bilateral hips who presented to the emergency department for left hip pain that has been going on for the last several months.  On physical exam there is mild tenderness to palpation over the posterior left hip with intact range of motion.  Sensation is intact to bilateral lower extremities.  Presentation was consistent with hip

## 2024-09-07 ENCOUNTER — HOSPITAL ENCOUNTER (EMERGENCY)
Age: 54
Discharge: HOME | End: 2024-09-07
Attending: STUDENT IN AN ORGANIZED HEALTH CARE EDUCATION/TRAINING PROGRAM
Payer: COMMERCIAL

## 2024-09-07 VITALS
HEIGHT: 70 IN | WEIGHT: 315 LBS | OXYGEN SATURATION: 94 % | RESPIRATION RATE: 20 BRPM | HEART RATE: 82 BPM | BODY MASS INDEX: 45.1 KG/M2 | SYSTOLIC BLOOD PRESSURE: 152 MMHG | TEMPERATURE: 98 F | DIASTOLIC BLOOD PRESSURE: 107 MMHG

## 2024-09-07 DIAGNOSIS — M54.42 CHRONIC LEFT-SIDED LOW BACK PAIN WITH LEFT-SIDED SCIATICA: Primary | ICD-10-CM

## 2024-09-07 DIAGNOSIS — G89.29 CHRONIC LEFT-SIDED LOW BACK PAIN WITH LEFT-SIDED SCIATICA: Primary | ICD-10-CM

## 2024-09-07 PROCEDURE — 6360000002 HC RX W HCPCS: Performed by: STUDENT IN AN ORGANIZED HEALTH CARE EDUCATION/TRAINING PROGRAM

## 2024-09-07 PROCEDURE — 99284 EMERGENCY DEPT VISIT MOD MDM: CPT

## 2024-09-07 PROCEDURE — 96372 THER/PROPH/DIAG INJ SC/IM: CPT

## 2024-09-07 PROCEDURE — 6370000000 HC RX 637 (ALT 250 FOR IP): Performed by: STUDENT IN AN ORGANIZED HEALTH CARE EDUCATION/TRAINING PROGRAM

## 2024-09-07 RX ORDER — KETOROLAC TROMETHAMINE 15 MG/ML
15 INJECTION, SOLUTION INTRAMUSCULAR; INTRAVENOUS ONCE
Status: COMPLETED | OUTPATIENT
Start: 2024-09-07 | End: 2024-09-07

## 2024-09-07 RX ORDER — PREDNISONE 50 MG/1
50 TABLET ORAL DAILY
Qty: 5 TABLET | Refills: 0 | Status: SHIPPED | OUTPATIENT
Start: 2024-09-07 | End: 2024-09-12

## 2024-09-07 RX ORDER — NAPROXEN 500 MG/1
500 TABLET ORAL 2 TIMES DAILY WITH MEALS
Qty: 10 TABLET | Refills: 0 | Status: SHIPPED | OUTPATIENT
Start: 2024-09-07 | End: 2024-09-12

## 2024-09-07 RX ORDER — LIDOCAINE 4 G/G
1 PATCH TOPICAL ONCE
Status: DISCONTINUED | OUTPATIENT
Start: 2024-09-07 | End: 2024-09-07 | Stop reason: HOSPADM

## 2024-09-07 RX ORDER — METHOCARBAMOL 750 MG/1
750 TABLET, FILM COATED ORAL 4 TIMES DAILY PRN
Qty: 20 TABLET | Refills: 0 | Status: SHIPPED | OUTPATIENT
Start: 2024-09-07 | End: 2024-09-12

## 2024-09-07 RX ORDER — LIDOCAINE 4 G/G
1 PATCH TOPICAL DAILY
Qty: 10 EACH | Refills: 0 | Status: SHIPPED | OUTPATIENT
Start: 2024-09-07 | End: 2024-09-17

## 2024-09-07 RX ADMIN — PREDNISONE 50 MG: 5 TABLET ORAL at 02:51

## 2024-09-07 RX ADMIN — KETOROLAC TROMETHAMINE 15 MG: 15 INJECTION, SOLUTION INTRAMUSCULAR; INTRAVENOUS at 02:53

## 2024-09-07 NOTE — ED NOTES
Patient states he was seen at pain management and is getting \"blocks\" done but they are not helping. States he just needs something to help him sleep. \"I'm not a drug head, I just need something to help sometimes. That's what they dont understand\" \"I dont sell my medications\". Does not take OTC medications because they dont work.

## 2024-09-07 NOTE — ED PROVIDER NOTES
advised to call them tomorrow for further evaluation.  Patient treated with Toradol, prednisone, lidocaine patch here in the ED.  He did drive himself here so at this point in time with holding a dose of muscle relaxers but will be sent with a prescription.  Patient is comfortable and in agreement the plan of care and will be discharged home.  Given return precautions for the ED.  Did stressed the need for follow-up with pain management for his ongoing chronic low back pain/hip pain.       Patient was given the following medications:   Medications   lidocaine 4 % external patch 1 patch (1 patch TransDERmal Patch Applied 9/7/24 0252)   predniSONE (DELTASONE) tablet 50 mg (50 mg Oral Given 9/7/24 0251)   ketorolac (TORADOL) injection 15 mg (15 mg IntraMUSCular Given 9/7/24 0253)        CONSULTS:   None   Discussion with Other Professionals: None   Social Determinants: None   Chronic Conditions:  None    Records Reviewed: Chronic pain, migraines, MDD, hyperlipidemia, hypertension, GERD, arthritis      Disposition Considerations: Appropriate for outpatient management  I am the Primary Clinician of Record.        FINAL IMPRESSION    1. Chronic left-sided low back pain with left-sided sciatica           DISPOSITION/PLAN:     Pt discharged home in stable condition.     PATIENT REFERRED TO:   Mala Zamarripa, ANJEL - CNP  [address] [address]  [phone]    Schedule an appointment as soon as possible for a visit          DISCHARGE MEDICATIONS:   New Prescriptions    LIDOCAINE 4 % EXTERNAL PATCH    Place 1 patch onto the skin daily for 10 days    METHOCARBAMOL (ROBAXIN-750) 750 MG TABLET    Take 1 tablet by mouth 4 times daily as needed (back pain)    NAPROXEN (NAPROSYN) 500 MG TABLET    Take 1 tablet by mouth 2 times daily (with meals) for 5 days    PREDNISONE (DELTASONE) 50 MG TABLET    Take 1 tablet by mouth daily for 5 days        DISCONTINUED MEDICATIONS:   Discontinued Medications    MELOXICAM (MOBIC)  7.5 MG TABLET    Take 1 tablet by mouth daily              (Please note that portions of this note were completed with a voice recognition program.  Efforts were made to edit the dictations but occasionally words are mis-transcribed.)       Chrei Franco MD (electronically signed)             Cheri Franco MD  09/07/24 0256

## 2024-11-07 ENCOUNTER — HOSPITAL ENCOUNTER (OUTPATIENT)
Dept: CT IMAGING | Age: 54
Discharge: HOME | End: 2024-11-07
Payer: COMMERCIAL

## 2024-11-07 DIAGNOSIS — R91.1 PULMONARY NODULE: ICD-10-CM

## 2024-11-07 PROCEDURE — 71250 CT THORAX DX C-: CPT

## 2024-12-11 ENCOUNTER — HOSPITAL ENCOUNTER (EMERGENCY)
Age: 54
Discharge: HOME | End: 2024-12-11
Attending: STUDENT IN AN ORGANIZED HEALTH CARE EDUCATION/TRAINING PROGRAM
Payer: COMMERCIAL

## 2024-12-11 VITALS
SYSTOLIC BLOOD PRESSURE: 129 MMHG | DIASTOLIC BLOOD PRESSURE: 65 MMHG | HEIGHT: 69 IN | RESPIRATION RATE: 16 BRPM | OXYGEN SATURATION: 93 % | BODY MASS INDEX: 46.65 KG/M2 | TEMPERATURE: 98.1 F | WEIGHT: 315 LBS | HEART RATE: 81 BPM

## 2024-12-11 DIAGNOSIS — R20.2 PARESTHESIA: Primary | ICD-10-CM

## 2024-12-11 PROCEDURE — 99283 EMERGENCY DEPT VISIT LOW MDM: CPT

## 2024-12-11 ASSESSMENT — PAIN - FUNCTIONAL ASSESSMENT: PAIN_FUNCTIONAL_ASSESSMENT: NONE - DENIES PAIN

## 2024-12-11 NOTE — DISCHARGE INSTRUCTIONS
You were evaluated in the emergency department for tingling in fingers. Assessments and testing completed during your visit were reassuring and at this time there is no indication for further testing, treatment or admission to the hospital. Given this it is appropriate to discharge you from the emergency department. At the time of discharge we discussed the following:    Please discuss further with your primary doctor and avoid activities which worsen your condition. Please follow up to specialist per the referral if condition worsens.     Please note that sometimes it is difficult to diagnose a medical condition early in the disease process before the disease is fully manifest. Because of this, should you develop any new or worsening symptoms, you may return at any time to the emergency department for another evaluation. If available you are also recommended to review this visit with your primary care physician or other medical provider in the next 7 days. Thank you for allowing us to care for you today.

## 2024-12-11 NOTE — ED PROVIDER NOTES
[facility] ED     EMERGENCY DEPARTMENT ENCOUNTER         Pt Name: Francois Louis   MRN: [id number]   Birthdate 1970   Date of evaluation: 12/11/2024   Provider: Jefry Ochoa MD   PCP: Ashia Davis PA-C   Note Started: 1:41 AM EST 12/11/24       Chief Complaint     Numbness (Finger numbness more on left, sometimes on the right for the last week. )      History of Present Illness     Francois Louis is a 54 y.o. male who presents with intermittent paresthesias of various digits of his hands left greater than right.  The patient has no major contributing past medical history has generally been in baseline health denies any recent febrile illness denies any recent trauma.  He states that he does spend long peers of time driving and will rest his left elbow on the windowsill of the car.  He states that he is currently asymptomatic in the right hand and even the left hand though his symptoms have been more prominent in the left hand and he states that at various intervals he will have some transient tingling sensations of the little finger of the left hand or occasionally digits 2 and 3 of the left hand.  Given perhaps some worsening over the past few days he presents for evaluation.  He has no other acute complaints.      Review of Systems     Positives and pertinent negatives as per HPI.    Past Medical, Surgical, Family, and Social History     He has a past medical history of Anxiety, Bilateral primary osteoarthritis of hip, Bipolar 1 disorder, manic, mild (HCC), COPD exacerbation (HCC), Gastroesophageal reflux disease without esophagitis, HTN (hypertension), Hyperlipidemia, IBS (irritable bowel syndrome), Low back pain, MDD (major depressive disorder), Migraine syndrome, Morbid obesity, Pneumonia, and Tobacco abuse.  He has a past surgical history that includes Cholecystectomy.  His family history includes Dementia in his mother; Heart Disease in his father.  He reports that he quit smoking

## 2024-12-18 ENCOUNTER — TELEPHONE (OUTPATIENT)
Dept: ORTHOPEDIC SURGERY | Age: 54
End: 2024-12-18

## 2024-12-18 NOTE — TELEPHONE ENCOUNTER
LMOR FOR PATIENT WITH DIRECT CALL BACK #. ATTEMPTING TO SCHEDULE ER FOLLOW UP APPOINTMENT FOR BILATERAL HANDS WITH DR BELL.  
peripheral vision intact/cranial nerves 2-12 intact/tongue is midline/central and peripheral vision intact/central vision intact/extra-ocular movements intact

## 2025-03-13 ENCOUNTER — APPOINTMENT (OUTPATIENT)
Dept: GENERAL RADIOLOGY | Age: 55
End: 2025-03-13
Payer: COMMERCIAL

## 2025-03-13 ENCOUNTER — HOSPITAL ENCOUNTER (EMERGENCY)
Age: 55
Discharge: HOME | End: 2025-03-14
Attending: STUDENT IN AN ORGANIZED HEALTH CARE EDUCATION/TRAINING PROGRAM
Payer: COMMERCIAL

## 2025-03-13 DIAGNOSIS — R53.83 FATIGUE, UNSPECIFIED TYPE: Primary | ICD-10-CM

## 2025-03-13 LAB
ALBUMIN SERPL-MCNC: 3.5 G/DL (ref 3.4–5)
ALBUMIN/GLOB SERPL: 1.2 {RATIO} (ref 1.1–2.2)
ALP SERPL-CCNC: 75 U/L (ref 40–129)
ALT SERPL-CCNC: 22 U/L (ref 10–40)
ANION GAP SERPL CALCULATED.3IONS-SCNC: 9 MMOL/L (ref 3–16)
AST SERPL-CCNC: 22 U/L (ref 15–37)
BASOPHILS # BLD: 0.1 K/UL (ref 0–0.2)
BASOPHILS NFR BLD: 1 %
BILIRUB SERPL-MCNC: 0.3 MG/DL (ref 0–1)
BUN SERPL-MCNC: 13 MG/DL (ref 7–20)
CALCIUM SERPL-MCNC: 8.3 MG/DL (ref 8.3–10.6)
CHLORIDE SERPL-SCNC: 101 MMOL/L (ref 99–110)
CO2 SERPL-SCNC: 27 MMOL/L (ref 21–32)
CREAT SERPL-MCNC: 0.8 MG/DL (ref 0.9–1.3)
DEPRECATED RDW RBC AUTO: 15.1 % (ref 12.4–15.4)
EOSINOPHIL # BLD: 0.2 K/UL (ref 0–0.6)
EOSINOPHIL NFR BLD: 2.5 %
FLUAV RNA UPPER RESP QL NAA+PROBE: NEGATIVE
FLUBV AG NPH QL: NEGATIVE
GFR SERPLBLD CREATININE-BSD FMLA CKD-EPI: >90 ML/MIN/{1.73_M2}
GLUCOSE SERPL-MCNC: 117 MG/DL (ref 70–99)
HCT VFR BLD AUTO: 47.3 % (ref 40.5–52.5)
HGB BLD-MCNC: 15.5 G/DL (ref 13.5–17.5)
LIPASE SERPL-CCNC: 29 U/L (ref 13–60)
LYMPHOCYTES # BLD: 1.8 K/UL (ref 1–5.1)
LYMPHOCYTES NFR BLD: 22.2 %
MCH RBC QN AUTO: 29.5 PG (ref 26–34)
MCHC RBC AUTO-ENTMCNC: 32.8 G/DL (ref 31–36)
MCV RBC AUTO: 89.8 FL (ref 80–100)
MONOCYTES # BLD: 0.8 K/UL (ref 0–1.3)
MONOCYTES NFR BLD: 9.6 %
NEUTROPHILS # BLD: 5.4 K/UL (ref 1.7–7.7)
NEUTROPHILS NFR BLD: 64.7 %
NT-PROBNP SERPL-MCNC: <36 PG/ML (ref 0–124)
PLATELET # BLD AUTO: 238 K/UL (ref 135–450)
PMV BLD AUTO: 8.1 FL (ref 5–10.5)
POTASSIUM SERPL-SCNC: 4.1 MMOL/L (ref 3.5–5.1)
PROT SERPL-MCNC: 6.4 G/DL (ref 6.4–8.2)
RBC # BLD AUTO: 5.27 M/UL (ref 4.2–5.9)
SARS-COV-2 RDRP RESP QL NAA+PROBE: NOT DETECTED
SODIUM SERPL-SCNC: 137 MMOL/L (ref 136–145)
TROPONIN, HIGH SENSITIVITY: 23 NG/L (ref 0–22)
WBC # BLD AUTO: 8.3 K/UL (ref 4–11)

## 2025-03-13 PROCEDURE — 71045 X-RAY EXAM CHEST 1 VIEW: CPT

## 2025-03-13 PROCEDURE — 87635 SARS-COV-2 COVID-19 AMP PRB: CPT

## 2025-03-13 PROCEDURE — 6360000002 HC RX W HCPCS: Performed by: PHYSICIAN ASSISTANT

## 2025-03-13 PROCEDURE — 87804 INFLUENZA ASSAY W/OPTIC: CPT

## 2025-03-13 PROCEDURE — 83690 ASSAY OF LIPASE: CPT

## 2025-03-13 PROCEDURE — 84484 ASSAY OF TROPONIN QUANT: CPT

## 2025-03-13 PROCEDURE — 93005 ELECTROCARDIOGRAM TRACING: CPT | Performed by: PHYSICIAN ASSISTANT

## 2025-03-13 PROCEDURE — 85025 COMPLETE CBC W/AUTO DIFF WBC: CPT

## 2025-03-13 PROCEDURE — 83880 ASSAY OF NATRIURETIC PEPTIDE: CPT

## 2025-03-13 PROCEDURE — 80053 COMPREHEN METABOLIC PANEL: CPT

## 2025-03-13 PROCEDURE — 96372 THER/PROPH/DIAG INJ SC/IM: CPT

## 2025-03-13 PROCEDURE — 99285 EMERGENCY DEPT VISIT HI MDM: CPT

## 2025-03-13 RX ORDER — KETOROLAC TROMETHAMINE 15 MG/ML
15 INJECTION, SOLUTION INTRAMUSCULAR; INTRAVENOUS ONCE
Status: COMPLETED | OUTPATIENT
Start: 2025-03-13 | End: 2025-03-13

## 2025-03-13 RX ADMIN — KETOROLAC TROMETHAMINE 15 MG: 15 INJECTION, SOLUTION INTRAMUSCULAR; INTRAVENOUS at 22:58

## 2025-03-13 ASSESSMENT — PAIN - FUNCTIONAL ASSESSMENT: PAIN_FUNCTIONAL_ASSESSMENT: NONE - DENIES PAIN

## 2025-03-14 VITALS
HEIGHT: 69 IN | WEIGHT: 315 LBS | DIASTOLIC BLOOD PRESSURE: 81 MMHG | TEMPERATURE: 98.2 F | BODY MASS INDEX: 46.65 KG/M2 | OXYGEN SATURATION: 93 % | HEART RATE: 84 BPM | RESPIRATION RATE: 20 BRPM | SYSTOLIC BLOOD PRESSURE: 141 MMHG

## 2025-03-14 LAB
EKG ATRIAL RATE: 95 BPM
EKG DIAGNOSIS: NORMAL
EKG P AXIS: 78 DEGREES
EKG P-R INTERVAL: 180 MS
EKG Q-T INTERVAL: 334 MS
EKG QRS DURATION: 72 MS
EKG QTC CALCULATION (BAZETT): 419 MS
EKG R AXIS: 79 DEGREES
EKG T AXIS: 73 DEGREES
EKG VENTRICULAR RATE: 95 BPM
TROPONIN, HIGH SENSITIVITY: 22 NG/L (ref 0–22)

## 2025-03-14 PROCEDURE — 84484 ASSAY OF TROPONIN QUANT: CPT

## 2025-03-14 PROCEDURE — 36415 COLL VENOUS BLD VENIPUNCTURE: CPT

## 2025-03-14 PROCEDURE — 93010 ELECTROCARDIOGRAM REPORT: CPT | Performed by: INTERNAL MEDICINE

## 2025-03-14 NOTE — DISCHARGE INSTRUCTIONS
Return the nearest ED if you develop severe nausea and vomiting, fevers, severe pain, other concerning symptoms.

## 2025-03-14 NOTE — ED PROVIDER NOTES
I independently examined and evaluated Francois Louis.  I personally saw the patient and performed a substantive portion of the visit including all aspects of the medical decision making.    In brief, this 54-year-old male is presenting with generalized fatigue and felt like his legs were weak shortly prior to arrival.  Notes that he did not eat or drink much today.  Denies any nausea, states he just does not feel like eating.  Denies any severe pain.  Does have some chronic hip pain which he states is better when I evaluated him after he received ketorolac here..    Focused exam revealed   General: Alert, no acute distress, patient resting comfortably   Skin: warm, intact, no pallor noted   Head: Normocephalic, atraumatic   Eye: Normal conjunctiva   Cardiac: Normal peripheral perfusion  Respiratory: No acute distress   Musculoskeletal: No deformity, full ROM.   Neurological: alert and oriented, normal sensory and motor observed.   Psychiatric: Cooperative    ED course: Labwork obtained and is overall reassuring.  Flu and COVID are negative.  Chest x-ray shows no acute process.  Patient is reassured.  I suspect his lack of oral intake today is likely the source of his overall weakness.  I encouraged him to increase his clear fluid intake.  Given return precautions for severe pain, nausea vomiting, other concerning symptoms.    ECG    The Ekg interpreted by me shows sinus rhythm with PVCs and a rate of 95 bpm.  Normal axis.  No acute injury pattern.  , QRS 72, QTc 419.    No significant change from prior EKG dated 7/1/2019    All diagnostic, treatment, and disposition decisions were made by myself in conjunction with the advanced practice provider/resident.    I personally saw the patient and made/approved the management plan and take responsibility for the patient management.     For all further details of the patient's emergency department visit, please see the advanced practice provider's/resident's  documentation.     Galileo Lockett,   03/14/25 0415

## 2025-03-14 NOTE — ED PROVIDER NOTES
Vitals:    03/13/25 2156 03/13/25 2159 03/14/25 0130   BP: (!) 170/84  (!) 141/81   Pulse: 99  84   Resp: 20     Temp: 98.2 °F (36.8 °C)     TempSrc: Oral     SpO2: 93%  93%   Weight:  (!) 172.4 kg (380 lb)    Height:  1.753 m (5' 9\")        Patient was given the following medications:  Medications   ketorolac (TORADOL) injection 15 mg (15 mg IntraMUSCular Given 3/13/25 7447)               Sepsis:  Is this patient to be included in the SEP-1 Core Measure due to severe sepsis or septic shock?   No   Exclusion criteria - the patient is NOT to be included for SEP-1 Core Measure due to:  2+ SIRS criteria are not met     Chronic Conditions affecting care:    has a past medical history of Anxiety, Bilateral primary osteoarthritis of hip (6/30/2020), Bipolar 1 disorder, manic, mild (HCC) (11/8/2016), COPD exacerbation (HCC) (6/7/2020), Gastroesophageal reflux disease without esophagitis (6/3/2016), HTN (hypertension) (8/7/2015), Hyperlipidemia, IBS (irritable bowel syndrome) (3/21/2013), Low back pain (6/30/2020), MDD (major depressive disorder) (10/28/2013), Migraine syndrome (3/21/2013), Morbid obesity, Pneumonia, and Tobacco abuse.    CONSULTS: (Who and What was discussed)  None      CC/HPI Summary, DDx, ED Course, and Reassessment:   Francois Louis is a 54 y.o. male with a PMHx of HTN, HLD, COPD, GERD, morbid obesity, osteoarthritis of the bilateral hips, who presents to the ED with 1 day of fatigue and bilateral leg weakness.  He reports he is only drank 2 cups of water, and has only urinated once today, which is not normal for him.  He also reports he is only eaten much less than usual today.  He reports his low back and his hips have been causing him a significant mount of pain lately but this is chronic for him.      Very faint inspiratory wheezes are present on auscultation of the lungs, which I suspect is baseline with his COPD as he reports no increased shortness of breath or need to use his inhaler more often.                  (Please note that portions of this note were completed with a voice recognition program.  Efforts were made to edit the dictations but occasionally words are mis-transcribed.)    Randee Marie PA-C (electronically signed)      Randee Marie PA-C  03/14/25 1046

## 2025-03-16 ENCOUNTER — HOSPITAL ENCOUNTER (EMERGENCY)
Age: 55
Discharge: HOME | End: 2025-03-16
Payer: COMMERCIAL

## 2025-03-16 ENCOUNTER — APPOINTMENT (OUTPATIENT)
Dept: GENERAL RADIOLOGY | Age: 55
End: 2025-03-16
Payer: COMMERCIAL

## 2025-03-16 VITALS
HEIGHT: 70 IN | HEART RATE: 81 BPM | DIASTOLIC BLOOD PRESSURE: 92 MMHG | SYSTOLIC BLOOD PRESSURE: 143 MMHG | WEIGHT: 315 LBS | TEMPERATURE: 98.2 F | BODY MASS INDEX: 45.1 KG/M2 | OXYGEN SATURATION: 95 % | RESPIRATION RATE: 16 BRPM

## 2025-03-16 DIAGNOSIS — S46.912A STRAIN OF UNSPECIFIED MUSCLE, FASCIA AND TENDON AT SHOULDER AND UPPER ARM LEVEL, LEFT ARM, INITIAL ENCOUNTER: Primary | ICD-10-CM

## 2025-03-16 PROCEDURE — 99283 EMERGENCY DEPT VISIT LOW MDM: CPT

## 2025-03-16 PROCEDURE — 6370000000 HC RX 637 (ALT 250 FOR IP): Performed by: PHYSICIAN ASSISTANT

## 2025-03-16 PROCEDURE — 73030 X-RAY EXAM OF SHOULDER: CPT

## 2025-03-16 RX ORDER — NAPROXEN 500 MG/1
500 TABLET ORAL 2 TIMES DAILY WITH MEALS
Qty: 20 TABLET | Refills: 0 | Status: SHIPPED | OUTPATIENT
Start: 2025-03-16

## 2025-03-16 RX ORDER — ACETAMINOPHEN 325 MG/1
650 TABLET ORAL ONCE
Status: COMPLETED | OUTPATIENT
Start: 2025-03-16 | End: 2025-03-16

## 2025-03-16 RX ORDER — METHOCARBAMOL 500 MG/1
500 TABLET, FILM COATED ORAL 4 TIMES DAILY
Qty: 20 TABLET | Refills: 0 | Status: SHIPPED | OUTPATIENT
Start: 2025-03-16 | End: 2025-03-21

## 2025-03-16 RX ORDER — IBUPROFEN 600 MG/1
600 TABLET, FILM COATED ORAL ONCE
Status: COMPLETED | OUTPATIENT
Start: 2025-03-16 | End: 2025-03-16

## 2025-03-16 RX ADMIN — IBUPROFEN 600 MG: 600 TABLET, FILM COATED ORAL at 13:43

## 2025-03-16 RX ADMIN — ACETAMINOPHEN 650 MG: 325 TABLET ORAL at 13:43

## 2025-03-16 ASSESSMENT — PAIN SCALES - GENERAL
PAINLEVEL_OUTOF10: 7
PAINLEVEL_OUTOF10: 7
PAINLEVEL_OUTOF10: 4

## 2025-03-16 ASSESSMENT — PAIN DESCRIPTION - DESCRIPTORS
DESCRIPTORS: DISCOMFORT
DESCRIPTORS: ACHING

## 2025-03-16 ASSESSMENT — PAIN DESCRIPTION - ORIENTATION
ORIENTATION: LEFT

## 2025-03-16 ASSESSMENT — PAIN DESCRIPTION - LOCATION
LOCATION: SHOULDER

## 2025-03-16 ASSESSMENT — PAIN - FUNCTIONAL ASSESSMENT: PAIN_FUNCTIONAL_ASSESSMENT: 0-10

## 2025-03-16 ASSESSMENT — PAIN DESCRIPTION - FREQUENCY: FREQUENCY: CONTINUOUS

## 2025-03-16 ASSESSMENT — PAIN DESCRIPTION - PAIN TYPE
TYPE: ACUTE PAIN
TYPE: ACUTE PAIN

## 2025-03-16 NOTE — ED PROVIDER NOTES
bowel syndrome) (3/21/2013), Low back pain (6/30/2020), MDD (major depressive disorder) (10/28/2013), Migraine syndrome (3/21/2013), Morbid obesity, Pneumonia, and Tobacco abuse.     EMERGENCY DEPARTMENT COURSE and DIFFERENTIAL DIAGNOSIS/MDM:   Vitals:    Vitals:    03/16/25 1259 03/16/25 1438   BP: (!) 137/90 (!) 143/92   Pulse: 90 81   Resp: 16 16   Temp: 98.1 °F (36.7 °C) 98.2 °F (36.8 °C)   TempSrc: Oral Oral   SpO2: 95% 95%   Weight: (!) 172.4 kg (380 lb)    Height: 1.765 m (5' 9.5\")        Is this patient to be included in the SEP-1 Core Measure due to severe sepsis or septic shock?   No   Exclusion criteria - the patient is NOT to be included for SEP-1 Core Measure due to:  Infection is not suspected    Patient was given the following medications:  Medications   acetaminophen (TYLENOL) tablet 650 mg (650 mg Oral Given 3/16/25 1343)   ibuprofen (ADVIL;MOTRIN) tablet 600 mg (600 mg Oral Given 3/16/25 1343)             Chronic Conditions affecting care:    has a past medical history of Anxiety, Bilateral primary osteoarthritis of hip (6/30/2020), Bipolar 1 disorder, manic, mild (HCC) (11/8/2016), COPD exacerbation (HCC) (6/7/2020), Gastroesophageal reflux disease without esophagitis (6/3/2016), HTN (hypertension) (8/7/2015), Hyperlipidemia, IBS (irritable bowel syndrome) (3/21/2013), Low back pain (6/30/2020), MDD (major depressive disorder) (10/28/2013), Migraine syndrome (3/21/2013), Morbid obesity, Pneumonia, and Tobacco abuse.    CONSULTS: (Who and What was discussed)  None      Records Reviewed (External, Source and Summary)   CC/HPI Summary, DDx, ED Course, and Reassessment:   Disposition Considerations (tests considered but not done, Admit vs D/C, Shared Decision Making, Pt Expectation of Test or Tx.):        Patient presented here for evaluation of left shoulder pain for the past 2 days worse with movement started after he carried in groceries no fall or trauma states he has a pinched nerve in his neck

## 2025-03-20 ENCOUNTER — OFFICE VISIT (OUTPATIENT)
Dept: ORTHOPEDIC SURGERY | Age: 55
End: 2025-03-20

## 2025-03-20 VITALS — BODY MASS INDEX: 45.1 KG/M2 | WEIGHT: 315 LBS | HEIGHT: 70 IN

## 2025-03-20 DIAGNOSIS — M25.552 BILATERAL HIP PAIN: Primary | ICD-10-CM

## 2025-03-20 DIAGNOSIS — M16.0 PRIMARY OSTEOARTHRITIS OF BOTH HIPS: ICD-10-CM

## 2025-03-20 DIAGNOSIS — M25.551 BILATERAL HIP PAIN: Primary | ICD-10-CM

## 2025-03-20 NOTE — PROGRESS NOTES
3/20/2026       Treatment Plan: At this time I do recommend he be referred to  for consideration of treatment of his hip arthritis.  I told the patient that he may recommend medication therapy, injections under ultrasound guidance and as a last result may benefit from surgery.  I told him that he may not be a surgical candidate for variety of reasons but I would defer that judgment to Dr. Galan.

## 2025-03-25 ENCOUNTER — OFFICE VISIT (OUTPATIENT)
Dept: ORTHOPEDIC SURGERY | Age: 55
End: 2025-03-25

## 2025-03-25 DIAGNOSIS — M25.512 LEFT SHOULDER PAIN, UNSPECIFIED CHRONICITY: Primary | ICD-10-CM

## 2025-03-25 DIAGNOSIS — M75.82 TENDINITIS OF LEFT ROTATOR CUFF: ICD-10-CM

## 2025-03-25 RX ORDER — METHYLPREDNISOLONE ACETATE 40 MG/ML
80 INJECTION, SUSPENSION INTRA-ARTICULAR; INTRALESIONAL; INTRAMUSCULAR; SOFT TISSUE ONCE
Status: COMPLETED | OUTPATIENT
Start: 2025-03-25 | End: 2025-03-25

## 2025-03-25 RX ORDER — LIDOCAINE HYDROCHLORIDE 10 MG/ML
4 INJECTION, SOLUTION INFILTRATION; PERINEURAL ONCE
Status: COMPLETED | OUTPATIENT
Start: 2025-03-25 | End: 2025-03-25

## 2025-03-25 RX ADMIN — METHYLPREDNISOLONE ACETATE 80 MG: 40 INJECTION, SUSPENSION INTRA-ARTICULAR; INTRALESIONAL; INTRAMUSCULAR; SOFT TISSUE at 15:03

## 2025-03-25 RX ADMIN — LIDOCAINE HYDROCHLORIDE 4 ML: 10 INJECTION, SOLUTION INFILTRATION; PERINEURAL at 15:00

## 2025-03-25 NOTE — PROGRESS NOTES
Date:  3/25/2025    Name:  Francois Louis  Address:  [address] [place] [address], [address]  [facility] [address]    :  1970      Age:   54 y.o.    SSN:  [id number]      Medical Record Number:  [id number]    Reason for Visit:    Chief Complaint    Shoulder Pain (Np lt shoulder)      DOS:3/25/2025     HPI: Francois Louis is a 54 y.o. male here today for new patient evaluation regarding his left shoulder.    History of Present Illness  The patient is a 54-year-old male here today for a new patient evaluation regarding his left shoulder. He is right-hand dominant and has been experiencing persistent pain in his left shoulder for several years, which he attributes to an incident where he was carrying groceries upstairs using his left arm. No previous surgical interventions on the affected shoulder are reported. Despite the pain, he retains the ability to lift his arm, although with discomfort. The pain is localized to the anterior aspect of the shoulder and radiates down the arm. Additionally, numbness in all fingers of the left hand is reported. Initial medical consultation suggested a pinched nerve as the cause of his symptoms, while a subsequent visit to a different hospital resulted in a diagnosis of a pulled muscle. No opinion from a spine specialist has been sought, and no recent neck x-rays have been performed. He was advised that persistent symptoms would warrant a referral to a hand specialist.             ROS: All systems reviewed on patient intake form.  Pertinent items are noted in HPI.        Past Medical History:   Diagnosis Date    Anxiety     Bilateral primary osteoarthritis of hip 2020    Bipolar 1 disorder, manic, mild (HCC) 2016    COPD exacerbation (HCC) 2020    Gastroesophageal reflux disease without esophagitis 6/3/2016    HTN (hypertension) 2015    Hyperlipidemia     IBS (irritable bowel syndrome) 3/21/2013    Low back pain 2020    MDD (major depressive disorder) 10/28/2013

## 2025-03-30 ENCOUNTER — HOSPITAL ENCOUNTER (EMERGENCY)
Age: 55
Discharge: HOME | End: 2025-03-30
Attending: EMERGENCY MEDICINE
Payer: COMMERCIAL

## 2025-03-30 VITALS
OXYGEN SATURATION: 91 % | HEIGHT: 69 IN | WEIGHT: 315 LBS | BODY MASS INDEX: 46.65 KG/M2 | RESPIRATION RATE: 18 BRPM | DIASTOLIC BLOOD PRESSURE: 84 MMHG | TEMPERATURE: 97.6 F | SYSTOLIC BLOOD PRESSURE: 131 MMHG | HEART RATE: 90 BPM

## 2025-03-30 DIAGNOSIS — M25.552 PAIN OF BOTH HIP JOINTS: ICD-10-CM

## 2025-03-30 DIAGNOSIS — M25.551 PAIN OF BOTH HIP JOINTS: ICD-10-CM

## 2025-03-30 DIAGNOSIS — S46.912A STRAIN OF LEFT SHOULDER, INITIAL ENCOUNTER: Primary | ICD-10-CM

## 2025-03-30 PROCEDURE — 6370000000 HC RX 637 (ALT 250 FOR IP): Performed by: EMERGENCY MEDICINE

## 2025-03-30 PROCEDURE — 99284 EMERGENCY DEPT VISIT MOD MDM: CPT

## 2025-03-30 PROCEDURE — 6360000002 HC RX W HCPCS: Performed by: EMERGENCY MEDICINE

## 2025-03-30 PROCEDURE — 96372 THER/PROPH/DIAG INJ SC/IM: CPT

## 2025-03-30 RX ORDER — ACETAMINOPHEN 500 MG
1000 TABLET ORAL ONCE
Status: COMPLETED | OUTPATIENT
Start: 2025-03-30 | End: 2025-03-30

## 2025-03-30 RX ORDER — KETOROLAC TROMETHAMINE 30 MG/ML
30 INJECTION, SOLUTION INTRAMUSCULAR; INTRAVENOUS ONCE
Status: COMPLETED | OUTPATIENT
Start: 2025-03-30 | End: 2025-03-30

## 2025-03-30 RX ADMIN — ACETAMINOPHEN 1000 MG: 500 TABLET ORAL at 13:59

## 2025-03-30 RX ADMIN — KETOROLAC TROMETHAMINE 30 MG: 30 INJECTION, SOLUTION INTRAMUSCULAR at 13:59

## 2025-03-30 ASSESSMENT — PAIN DESCRIPTION - LOCATION
LOCATION: SHOULDER;HIP
LOCATION: HIP;SHOULDER

## 2025-03-30 ASSESSMENT — PAIN DESCRIPTION - ORIENTATION
ORIENTATION: LEFT
ORIENTATION: LEFT

## 2025-03-30 ASSESSMENT — PAIN DESCRIPTION - DESCRIPTORS
DESCRIPTORS: ACHING
DESCRIPTORS: ACHING

## 2025-03-30 ASSESSMENT — PAIN SCALES - GENERAL
PAINLEVEL_OUTOF10: 5
PAINLEVEL_OUTOF10: 5

## 2025-03-30 ASSESSMENT — PAIN - FUNCTIONAL ASSESSMENT: PAIN_FUNCTIONAL_ASSESSMENT: 0-10

## 2025-03-30 NOTE — ED PROVIDER NOTES
Emergency Department Provider Note  Location: [facility] EMERGENCY DEPARTMENT  3/30/2025     Patient Identification  Francois Louis is a 55 y.o. male    Chief Complaint  Hip Pain (Bilateral hip pain for the last 7 years and recently became worse recently. Left shoulder pain over the last month. Seeing ortho on Tuesday. 5/10 pain) and Shoulder Pain          HPI  (History provided by patient)  Patient is a 55-year-old male who presents with chronic left-sided shoulder pain and bilateral hip pains.  Reports that these are chronic and have been getting worse over the past month or 2.  He has seen orthopedics on 2 separate occasions and is referred to follow-up with a hip specialist on Tuesday.  He has been prescribed NSAIDs but does not take it.  He did not take any meds today.  Reports his usual bilateral hip pains unchanged with no new symptoms.  Denies any new numbness or weakness.  He is requesting \"shot that they usually give me and it makes the pain go away for 8 hours\".      Nursing Notes were all reviewed and agreed with, or any disagreements were addressed in the HPI:  Allergies:   Allergies   Allergen Reactions    Epinephrine Other (See Comments)     \"Speeds my heart up\".....       Past medical history:  has a past medical history of Anxiety, Bilateral primary osteoarthritis of hip (6/30/2020), Bipolar 1 disorder, manic, mild (HCC) (11/8/2016), COPD exacerbation (HCC) (6/7/2020), Gastroesophageal reflux disease without esophagitis (6/3/2016), HTN (hypertension) (8/7/2015), Hyperlipidemia, IBS (irritable bowel syndrome) (3/21/2013), Low back pain (6/30/2020), MDD (major depressive disorder) (10/28/2013), Migraine syndrome (3/21/2013), Morbid obesity, Pneumonia, and Tobacco abuse.    Past surgical history:  has a past surgical history that includes Cholecystectomy.    Home medications:   Prior to Admission medications    Medication Sig Start Date End Date Taking? Authorizing Provider   diclofenac sodium

## 2025-03-31 ENCOUNTER — HOSPITAL ENCOUNTER (EMERGENCY)
Age: 55
Discharge: HOME | End: 2025-03-31
Payer: COMMERCIAL

## 2025-03-31 VITALS
TEMPERATURE: 98.5 F | RESPIRATION RATE: 16 BRPM | OXYGEN SATURATION: 90 % | DIASTOLIC BLOOD PRESSURE: 88 MMHG | HEART RATE: 92 BPM | SYSTOLIC BLOOD PRESSURE: 143 MMHG

## 2025-03-31 DIAGNOSIS — G89.4 CHRONIC PAIN SYNDROME: Primary | ICD-10-CM

## 2025-03-31 DIAGNOSIS — M25.551 BILATERAL HIP PAIN: ICD-10-CM

## 2025-03-31 DIAGNOSIS — M54.50 CHRONIC BILATERAL LOW BACK PAIN WITHOUT SCIATICA: ICD-10-CM

## 2025-03-31 DIAGNOSIS — M25.552 BILATERAL HIP PAIN: ICD-10-CM

## 2025-03-31 DIAGNOSIS — G89.29 CHRONIC BILATERAL LOW BACK PAIN WITHOUT SCIATICA: ICD-10-CM

## 2025-03-31 PROCEDURE — 99284 EMERGENCY DEPT VISIT MOD MDM: CPT

## 2025-03-31 RX ORDER — MELOXICAM 7.5 MG/1
15 TABLET ORAL DAILY
Qty: 20 TABLET | Refills: 0 | Status: SHIPPED | OUTPATIENT
Start: 2025-03-31

## 2025-03-31 RX ORDER — LIDOCAINE 50 MG/G
1 PATCH TOPICAL DAILY
Qty: 10 PATCH | Refills: 0 | Status: SHIPPED | OUTPATIENT
Start: 2025-03-31 | End: 2025-04-10

## 2025-03-31 ASSESSMENT — PAIN - FUNCTIONAL ASSESSMENT: PAIN_FUNCTIONAL_ASSESSMENT: 0-10

## 2025-03-31 ASSESSMENT — PAIN DESCRIPTION - LOCATION: LOCATION: HIP

## 2025-03-31 ASSESSMENT — PAIN DESCRIPTION - ORIENTATION: ORIENTATION: RIGHT;LEFT

## 2025-03-31 NOTE — ED PROVIDER NOTES
[facility] EMERGENCY DEPARTMENT  EMERGENCY DEPARTMENT ENCOUNTER        Pt Name: Francois Louis  MRN: [id number]  Birthdate 1970  Date of evaluation: 3/31/2025  Provider: Diana Willis PA-C  PCP: Ashia Davis PA-C  Note Started: 11:12 AM EDT 3/31/25      DORIS. I have evaluated this patient.        CHIEF COMPLAINT       Chief Complaint   Patient presents with    Hip Pain     C/o bilateral hip pain x several months. Sees ortho. States seen in ED for same yesterday       HISTORY OF PRESENT ILLNESS: 1 or more Elements     History From: Patient             Chief Complaint: Hip pain    Francois Louis is a 55 y.o. male who presents he states he has had chronic pain in the left side of his neck left shoulder, bilateral hips, back for the past 7 years.  He states he has recently been seen by orthopedics, he has an appointment with a new Ortho doctor for his hip pain and osteoarthritis.  He states that nothing is helping for his pain and he has been up all night with pain.  He is not currently taking any medication.  He states he has tried meloxicam and lidocaine patches in the past and muscle relaxers without relief.  It sounds like he is seeing a pain management doctor in [place] but stopped seeing them due to the long distance drive.  He is denying any fever saddle anesthesia bowel or bladder incontinence abdominal pain urinary symptoms or injury.  He admits to pain in his low back bilaterally and in both of his hips at this time.  He is requesting pain medication.  He denies any leg pain or leg swelling    Nursing Notes were all reviewed and agreed with or any disagreements were addressed in the HPI.    REVIEW OF SYSTEMS :      Review of Systems    Positives and Pertinent negatives as per HPI.     SURGICAL HISTORY     Past Surgical History:   Procedure Laterality Date    CHOLECYSTECTOMY         CURRENTMEDICATIONS       Discharge Medication List as of 3/31/2025 10:39 AM        CONTINUE these medications

## 2025-03-31 NOTE — DISCHARGE INSTRUCTIONS
I think he would benefit from seeing a pain management doctor.  Please either go to your regular doctor for referral or try the first referral I gave you today.  Please continue to work on diet and exercise, water aerobics, take the anti-inflammatory and use the lidocaine patches in addition to the gel that you have at home.  Please follow-up with Ortho.

## 2025-04-01 ENCOUNTER — OFFICE VISIT (OUTPATIENT)
Dept: ORTHOPEDIC SURGERY | Age: 55
End: 2025-04-01
Payer: COMMERCIAL

## 2025-04-01 VITALS — HEIGHT: 69 IN | BODY MASS INDEX: 46.65 KG/M2 | WEIGHT: 315 LBS

## 2025-04-01 DIAGNOSIS — M16.0 PRIMARY OSTEOARTHRITIS OF BOTH HIPS: Primary | ICD-10-CM

## 2025-04-01 DIAGNOSIS — M54.16 LUMBAR RADICULOPATHY: ICD-10-CM

## 2025-04-01 PROCEDURE — 3017F COLORECTAL CA SCREEN DOC REV: CPT | Performed by: ORTHOPAEDIC SURGERY

## 2025-04-01 PROCEDURE — G8417 CALC BMI ABV UP PARAM F/U: HCPCS | Performed by: ORTHOPAEDIC SURGERY

## 2025-04-01 PROCEDURE — 99213 OFFICE O/P EST LOW 20 MIN: CPT | Performed by: ORTHOPAEDIC SURGERY

## 2025-04-01 PROCEDURE — 1036F TOBACCO NON-USER: CPT | Performed by: ORTHOPAEDIC SURGERY

## 2025-04-01 PROCEDURE — G8428 CUR MEDS NOT DOCUMENT: HCPCS | Performed by: ORTHOPAEDIC SURGERY

## 2025-04-01 NOTE — PROGRESS NOTES
tendonitis  []  []Not tested   []  []Not tested    Trochanteric tenderness  []  []Not tested  []  []Not tested   Sciatic neuropathic pain  []  []Not tested   []  []Not tested           Post-arthroplasty  [] All Neg  [] Not tested   [] All Neg  [] Not tested    Rectus tendonitis  []  []Not tested   []  []Not tested    Iliopsoas tendonitis       Start-up pain  []  []Not tested   []  []Not tested          Imaging    X-rays were ordered of bilateral hips previously.  They were reviewed today.  They demonstrate moderate osteoarthritis with large osteophytes.    Procedure:  No orders of the defined types were placed in this encounter.      Assessment and Plan  Francois was seen today for follow-up.    Diagnoses and all orders for this visit:    Primary osteoarthritis of both hips    Lumbar radiculopathy        Patient has no pain today when I rotate his hip.  Also the patient's pain location is more consistent with lumbar pathology.  He is referred for spine consultation.  No need to see us back at this time    I discussed with Francois Louis that his history, symptoms, signs, and imaging are most consistent with hip arthritis and lumbar radiculopathy    We reviewed the natural history of these conditions and treatment options ranging from conservative measures (rest, icing, activity modification, physical therapy, pain meds) to surgical options.     In terms of treatment, I recommended continuing with rest, icing, avoidance of painful activities, NSAIDs or pain meds as tolerated, and physical therapy.     .  We discussed surgical options as well, should conservative measures fail.     Electronically signed by Mohit Castano MD on 4/1/2025 at 1:59 PM  This dictation was generated by voice recognition computer software.  Although all attempts are made to edit the dictation for accuracy, there may be errors in the transcription that are not intended.

## 2025-04-30 ENCOUNTER — HOSPITAL ENCOUNTER (INPATIENT)
Age: 55
LOS: 1 days | Discharge: HOME | DRG: 421 | End: 2025-05-01
Attending: STUDENT IN AN ORGANIZED HEALTH CARE EDUCATION/TRAINING PROGRAM | Admitting: INTERNAL MEDICINE
Payer: COMMERCIAL

## 2025-04-30 ENCOUNTER — APPOINTMENT (OUTPATIENT)
Age: 55
DRG: 421 | End: 2025-04-30
Attending: INTERNAL MEDICINE
Payer: COMMERCIAL

## 2025-04-30 ENCOUNTER — APPOINTMENT (OUTPATIENT)
Dept: GENERAL RADIOLOGY | Age: 55
DRG: 421 | End: 2025-04-30
Payer: COMMERCIAL

## 2025-04-30 DIAGNOSIS — I50.812 CHRONIC RIGHT-SIDED CONGESTIVE HEART FAILURE (HCC): Primary | ICD-10-CM

## 2025-04-30 DIAGNOSIS — J18.9 PNEUMONIA DUE TO INFECTIOUS ORGANISM, UNSPECIFIED LATERALITY, UNSPECIFIED PART OF LUNG: ICD-10-CM

## 2025-04-30 DIAGNOSIS — J44.9 CHRONIC OBSTRUCTIVE PULMONARY DISEASE, UNSPECIFIED COPD TYPE (HCC): ICD-10-CM

## 2025-04-30 PROBLEM — R09.02 HYPOXIA: Status: ACTIVE | Noted: 2025-04-30

## 2025-04-30 PROBLEM — K42.9 UMBILICAL HERNIA WITHOUT OBSTRUCTION AND WITHOUT GANGRENE: Status: ACTIVE | Noted: 2025-04-30

## 2025-04-30 PROBLEM — E66.2 OBESITY HYPOVENTILATION SYNDROME (HCC): Status: ACTIVE | Noted: 2025-04-30

## 2025-04-30 PROBLEM — J96.91 RESPIRATORY FAILURE WITH HYPOXIA (HCC): Status: ACTIVE | Noted: 2025-04-30

## 2025-04-30 LAB
ALBUMIN SERPL-MCNC: 3.7 G/DL (ref 3.4–5)
ALBUMIN/GLOB SERPL: 1.1 {RATIO} (ref 1.1–2.2)
ALP SERPL-CCNC: 78 U/L (ref 40–129)
ALT SERPL-CCNC: 27 U/L (ref 10–40)
ANION GAP SERPL CALCULATED.3IONS-SCNC: 9 MMOL/L (ref 3–16)
AST SERPL-CCNC: 24 U/L (ref 15–37)
BASE EXCESS BLDV CALC-SCNC: 0.6 MMOL/L (ref -3–3)
BASE EXCESS BLDV CALC-SCNC: 0.7 MMOL/L (ref -3–3)
BASOPHILS # BLD: 0.1 K/UL (ref 0–0.2)
BASOPHILS NFR BLD: 0.6 %
BILIRUB SERPL-MCNC: 0.3 MG/DL (ref 0–1)
BUN SERPL-MCNC: 18 MG/DL (ref 7–20)
CALCIUM SERPL-MCNC: 9 MG/DL (ref 8.3–10.6)
CHLORIDE SERPL-SCNC: 105 MMOL/L (ref 99–110)
CO2 BLDV-SCNC: 30 MMOL/L
CO2 BLDV-SCNC: 30 MMOL/L
CO2 SERPL-SCNC: 28 MMOL/L (ref 21–32)
COHGB MFR BLDV: 1.6 % (ref 0–1.5)
COHGB MFR BLDV: 1.7 % (ref 0–1.5)
CREAT SERPL-MCNC: 0.6 MG/DL (ref 0.9–1.3)
DEPRECATED RDW RBC AUTO: 15.1 % (ref 12.4–15.4)
ECHO AO ASC DIAM: 3.8 CM
ECHO AO ASCENDING AORTA INDEX: 1.39 CM/M2
ECHO AV ACCELERATION TIME: 50 MS
ECHO AV AREA PEAK VELOCITY: 2 CM2
ECHO AV AREA VTI: 2 CM2
ECHO AV AREA/BSA PEAK VELOCITY: 0.7 CM2/M2
ECHO AV AREA/BSA VTI: 0.7 CM2/M2
ECHO AV MEAN GRADIENT: 7 MMHG
ECHO AV MEAN VELOCITY: 1.3 M/S
ECHO AV PEAK GRADIENT: 13 MMHG
ECHO AV PEAK VELOCITY: 1.8 M/S
ECHO AV VELOCITY RATIO: 0.56
ECHO AV VTI: 35.7 CM
ECHO BSA: 2.93 M2
ECHO EST RA PRESSURE: 15 MMHG
ECHO IVC PROX: 3.1 CM
ECHO LV E' LATERAL VELOCITY: 8.49 CM/S
ECHO LV E' SEPTAL VELOCITY: 9.57 CM/S
ECHO LV EDV A2C: 118 ML
ECHO LV EDV A4C: 91 ML
ECHO LV EDV INDEX A4C: 33 ML/M2
ECHO LV EDV NDEX A2C: 43 ML/M2
ECHO LV EF PHYSICIAN: 63 %
ECHO LV EJECTION FRACTION A2C: 62 %
ECHO LV EJECTION FRACTION A4C: 60 %
ECHO LV EJECTION FRACTION BIPLANE: 62 % (ref 55–100)
ECHO LV ESV A2C: 45 ML
ECHO LV ESV A4C: 36 ML
ECHO LV ESV INDEX A2C: 16 ML/M2
ECHO LV ESV INDEX A4C: 13 ML/M2
ECHO LV FRACTIONAL SHORTENING: 34 % (ref 28–44)
ECHO LV INTERNAL DIMENSION DIASTOLE INDEX: 2.12 CM/M2
ECHO LV INTERNAL DIMENSION DIASTOLIC: 5.8 CM (ref 4.2–5.9)
ECHO LV INTERNAL DIMENSION SYSTOLIC INDEX: 1.39 CM/M2
ECHO LV INTERNAL DIMENSION SYSTOLIC: 3.8 CM
ECHO LV IVSD: 1.2 CM (ref 0.6–1)
ECHO LV MASS 2D: 297 G (ref 88–224)
ECHO LV MASS INDEX 2D: 108.4 G/M2 (ref 49–115)
ECHO LV POSTERIOR WALL DIASTOLIC: 1.2 CM (ref 0.6–1)
ECHO LV RELATIVE WALL THICKNESS RATIO: 0.41
ECHO LVOT AREA: 3.5 CM2
ECHO LVOT AV VTI INDEX: 0.57
ECHO LVOT DIAM: 2.1 CM
ECHO LVOT MEAN GRADIENT: 2 MMHG
ECHO LVOT PEAK GRADIENT: 4 MMHG
ECHO LVOT PEAK VELOCITY: 1 M/S
ECHO LVOT STROKE VOLUME INDEX: 25.5 ML/M2
ECHO LVOT SV: 69.9 ML
ECHO LVOT VTI: 20.2 CM
ECHO MV A VELOCITY: 0.66 M/S
ECHO MV AREA VTI: 3 CM2
ECHO MV E DECELERATION TIME (DT): 261 MS
ECHO MV E VELOCITY: 0.83 M/S
ECHO MV E/A RATIO: 1.26
ECHO MV E/E' LATERAL: 9.78
ECHO MV E/E' RATIO (AVERAGED): 9.22
ECHO MV E/E' SEPTAL: 8.67
ECHO MV LVOT VTI INDEX: 1.14
ECHO MV MAX VELOCITY: 1 M/S
ECHO MV MEAN GRADIENT: 2 MMHG
ECHO MV MEAN VELOCITY: 0.6 M/S
ECHO MV PEAK GRADIENT: 4 MMHG
ECHO MV VTI: 23 CM
ECHO PV ACCELERATION TIME (AT): 143 MS
ECHO PV MAX VELOCITY: 1.1 M/S
ECHO PV PEAK GRADIENT: 5 MMHG
ECHO RV FREE WALL PEAK S': 14.6 CM/S
ECHO RV TAPSE: 3.2 CM (ref 1.7–?)
EOSINOPHIL # BLD: 0.2 K/UL (ref 0–0.6)
EOSINOPHIL NFR BLD: 1.6 %
GFR SERPLBLD CREATININE-BSD FMLA CKD-EPI: >90 ML/MIN/{1.73_M2}
GLUCOSE BLD-MCNC: 151 MG/DL (ref 70–99)
GLUCOSE BLD-MCNC: 164 MG/DL (ref 70–99)
GLUCOSE SERPL-MCNC: 106 MG/DL (ref 70–99)
HCO3 BLDV-SCNC: 28 MMOL/L (ref 23–29)
HCO3 BLDV-SCNC: 28.5 MMOL/L (ref 23–29)
HCT VFR BLD AUTO: 46.7 % (ref 40.5–52.5)
HGB BLD-MCNC: 15.2 G/DL (ref 13.5–17.5)
LACTATE BLDV-SCNC: 1.1 MMOL/L (ref 0.4–1.9)
LACTATE BLDV-SCNC: 1.5 MMOL/L (ref 0.4–1.9)
LYMPHOCYTES # BLD: 1.4 K/UL (ref 1–5.1)
LYMPHOCYTES NFR BLD: 14.7 %
MCH RBC QN AUTO: 29.5 PG (ref 26–34)
MCHC RBC AUTO-ENTMCNC: 32.6 G/DL (ref 31–36)
MCV RBC AUTO: 90.3 FL (ref 80–100)
METHGB MFR BLDV: 0.1 %
METHGB MFR BLDV: 0.3 %
MONOCYTES # BLD: 0.8 K/UL (ref 0–1.3)
MONOCYTES NFR BLD: 9 %
NEUTROPHILS # BLD: 6.9 K/UL (ref 1.7–7.7)
NEUTROPHILS NFR BLD: 74.1 %
NT-PROBNP SERPL-MCNC: 43 PG/ML (ref 0–124)
O2 CT VFR BLDV CALC: 19 VOL %
O2 THERAPY: ABNORMAL
O2 THERAPY: ABNORMAL
PCO2 BLDV: 55.9 MMHG (ref 40–50)
PCO2 BLDV: 57.8 MMHG (ref 40–50)
PERFORMED ON: ABNORMAL
PERFORMED ON: ABNORMAL
PH BLDV: 7.31 [PH] (ref 7.35–7.45)
PH BLDV: 7.32 [PH] (ref 7.35–7.45)
PLATELET # BLD AUTO: 222 K/UL (ref 135–450)
PMV BLD AUTO: 7.9 FL (ref 5–10.5)
PO2 BLDV: 41.1 MMHG (ref 25–40)
PO2 BLDV: 55.8 MMHG (ref 25–40)
POTASSIUM SERPL-SCNC: 4.8 MMOL/L (ref 3.5–5.1)
PROCALCITONIN SERPL IA-MCNC: 0.07 NG/ML (ref 0–0.15)
PROT SERPL-MCNC: 7 G/DL (ref 6.4–8.2)
RBC # BLD AUTO: 5.17 M/UL (ref 4.2–5.9)
SAO2 % BLDV: 71 %
SAO2 % BLDV: 86 %
SODIUM SERPL-SCNC: 142 MMOL/L (ref 136–145)
WBC # BLD AUTO: 9.3 K/UL (ref 4–11)

## 2025-04-30 PROCEDURE — 87040 BLOOD CULTURE FOR BACTERIA: CPT

## 2025-04-30 PROCEDURE — 2500000003 HC RX 250 WO HCPCS: Performed by: INTERNAL MEDICINE

## 2025-04-30 PROCEDURE — 84145 PROCALCITONIN (PCT): CPT

## 2025-04-30 PROCEDURE — 2580000003 HC RX 258: Performed by: PHYSICIAN ASSISTANT

## 2025-04-30 PROCEDURE — 94660 CPAP INITIATION&MGMT: CPT

## 2025-04-30 PROCEDURE — 99223 1ST HOSP IP/OBS HIGH 75: CPT | Performed by: INTERNAL MEDICINE

## 2025-04-30 PROCEDURE — 6360000004 HC RX CONTRAST MEDICATION: Performed by: INTERNAL MEDICINE

## 2025-04-30 PROCEDURE — 6370000000 HC RX 637 (ALT 250 FOR IP)

## 2025-04-30 PROCEDURE — 83605 ASSAY OF LACTIC ACID: CPT

## 2025-04-30 PROCEDURE — 6370000000 HC RX 637 (ALT 250 FOR IP): Performed by: INTERNAL MEDICINE

## 2025-04-30 PROCEDURE — 99255 IP/OBS CONSLTJ NEW/EST HI 80: CPT | Performed by: INTERNAL MEDICINE

## 2025-04-30 PROCEDURE — 80053 COMPREHEN METABOLIC PANEL: CPT

## 2025-04-30 PROCEDURE — 82803 BLOOD GASES ANY COMBINATION: CPT

## 2025-04-30 PROCEDURE — 83880 ASSAY OF NATRIURETIC PEPTIDE: CPT

## 2025-04-30 PROCEDURE — 6360000002 HC RX W HCPCS: Performed by: INTERNAL MEDICINE

## 2025-04-30 PROCEDURE — 96372 THER/PROPH/DIAG INJ SC/IM: CPT

## 2025-04-30 PROCEDURE — 85025 COMPLETE CBC W/AUTO DIFF WBC: CPT

## 2025-04-30 PROCEDURE — 94761 N-INVAS EAR/PLS OXIMETRY MLT: CPT

## 2025-04-30 PROCEDURE — C8929 TTE W OR WO FOL WCON,DOPPLER: HCPCS

## 2025-04-30 PROCEDURE — 2500000003 HC RX 250 WO HCPCS: Performed by: PHYSICIAN ASSISTANT

## 2025-04-30 PROCEDURE — 71045 X-RAY EXAM CHEST 1 VIEW: CPT

## 2025-04-30 PROCEDURE — 99285 EMERGENCY DEPT VISIT HI MDM: CPT

## 2025-04-30 PROCEDURE — 36415 COLL VENOUS BLD VENIPUNCTURE: CPT

## 2025-04-30 PROCEDURE — 2700000000 HC OXYGEN THERAPY PER DAY

## 2025-04-30 PROCEDURE — 5A09357 ASSISTANCE WITH RESPIRATORY VENTILATION, LESS THAN 24 CONSECUTIVE HOURS, CONTINUOUS POSITIVE AIRWAY PRESSURE: ICD-10-PCS | Performed by: INTERNAL MEDICINE

## 2025-04-30 PROCEDURE — 96374 THER/PROPH/DIAG INJ IV PUSH: CPT

## 2025-04-30 PROCEDURE — 6360000002 HC RX W HCPCS: Performed by: PHYSICIAN ASSISTANT

## 2025-04-30 PROCEDURE — 6370000000 HC RX 637 (ALT 250 FOR IP): Performed by: PHYSICIAN ASSISTANT

## 2025-04-30 PROCEDURE — 1200000000 HC SEMI PRIVATE

## 2025-04-30 RX ORDER — ONDANSETRON 2 MG/ML
4 INJECTION INTRAMUSCULAR; INTRAVENOUS EVERY 6 HOURS PRN
Status: DISCONTINUED | OUTPATIENT
Start: 2025-04-30 | End: 2025-05-01 | Stop reason: HOSPADM

## 2025-04-30 RX ORDER — IPRATROPIUM BROMIDE AND ALBUTEROL SULFATE 2.5; .5 MG/3ML; MG/3ML
1 SOLUTION RESPIRATORY (INHALATION) ONCE
Status: COMPLETED | OUTPATIENT
Start: 2025-04-30 | End: 2025-04-30

## 2025-04-30 RX ORDER — ACETAMINOPHEN 650 MG/1
650 SUPPOSITORY RECTAL EVERY 6 HOURS PRN
Status: DISCONTINUED | OUTPATIENT
Start: 2025-04-30 | End: 2025-05-01 | Stop reason: HOSPADM

## 2025-04-30 RX ORDER — DOXYCYCLINE HYCLATE 100 MG
100 TABLET ORAL EVERY 12 HOURS SCHEDULED
Status: DISCONTINUED | OUTPATIENT
Start: 2025-04-30 | End: 2025-04-30

## 2025-04-30 RX ORDER — ONDANSETRON 4 MG/1
4 TABLET, ORALLY DISINTEGRATING ORAL EVERY 8 HOURS PRN
Status: DISCONTINUED | OUTPATIENT
Start: 2025-04-30 | End: 2025-05-01 | Stop reason: HOSPADM

## 2025-04-30 RX ORDER — NAPROXEN 500 MG/1
500 TABLET ORAL 2 TIMES DAILY WITH MEALS
Status: DISCONTINUED | OUTPATIENT
Start: 2025-04-30 | End: 2025-04-30

## 2025-04-30 RX ORDER — ENOXAPARIN SODIUM 100 MG/ML
60 INJECTION SUBCUTANEOUS 2 TIMES DAILY
Status: DISCONTINUED | OUTPATIENT
Start: 2025-04-30 | End: 2025-05-01 | Stop reason: HOSPADM

## 2025-04-30 RX ORDER — POLYETHYLENE GLYCOL 3350 17 G/17G
17 POWDER, FOR SOLUTION ORAL DAILY PRN
Status: DISCONTINUED | OUTPATIENT
Start: 2025-04-30 | End: 2025-05-01 | Stop reason: HOSPADM

## 2025-04-30 RX ORDER — GUAIFENESIN 600 MG/1
600 TABLET, EXTENDED RELEASE ORAL 2 TIMES DAILY
Status: DISCONTINUED | OUTPATIENT
Start: 2025-04-30 | End: 2025-05-01 | Stop reason: HOSPADM

## 2025-04-30 RX ORDER — FUROSEMIDE 10 MG/ML
20 INJECTION INTRAMUSCULAR; INTRAVENOUS 2 TIMES DAILY
Status: DISCONTINUED | OUTPATIENT
Start: 2025-04-30 | End: 2025-05-01 | Stop reason: HOSPADM

## 2025-04-30 RX ORDER — GLUCAGON 1 MG/ML
1 KIT INJECTION PRN
Status: DISCONTINUED | OUTPATIENT
Start: 2025-04-30 | End: 2025-05-01 | Stop reason: HOSPADM

## 2025-04-30 RX ORDER — DEXTROSE MONOHYDRATE 100 MG/ML
INJECTION, SOLUTION INTRAVENOUS CONTINUOUS PRN
Status: DISCONTINUED | OUTPATIENT
Start: 2025-04-30 | End: 2025-05-01 | Stop reason: HOSPADM

## 2025-04-30 RX ORDER — INSULIN LISPRO 100 [IU]/ML
0-4 INJECTION, SOLUTION INTRAVENOUS; SUBCUTANEOUS
Status: DISCONTINUED | OUTPATIENT
Start: 2025-04-30 | End: 2025-05-01 | Stop reason: HOSPADM

## 2025-04-30 RX ORDER — SODIUM CHLORIDE 0.9 % (FLUSH) 0.9 %
5-40 SYRINGE (ML) INJECTION EVERY 12 HOURS SCHEDULED
Status: DISCONTINUED | OUTPATIENT
Start: 2025-04-30 | End: 2025-05-01 | Stop reason: HOSPADM

## 2025-04-30 RX ORDER — ACETAMINOPHEN 325 MG/1
650 TABLET ORAL EVERY 6 HOURS PRN
Status: DISCONTINUED | OUTPATIENT
Start: 2025-04-30 | End: 2025-05-01 | Stop reason: HOSPADM

## 2025-04-30 RX ORDER — BENZONATATE 100 MG/1
100 CAPSULE ORAL 3 TIMES DAILY PRN
Status: DISCONTINUED | OUTPATIENT
Start: 2025-04-30 | End: 2025-05-01 | Stop reason: HOSPADM

## 2025-04-30 RX ORDER — MUPIROCIN 20 MG/G
OINTMENT TOPICAL 2 TIMES DAILY
Status: DISCONTINUED | OUTPATIENT
Start: 2025-04-30 | End: 2025-05-01 | Stop reason: HOSPADM

## 2025-04-30 RX ORDER — ATORVASTATIN CALCIUM 40 MG/1
40 TABLET, FILM COATED ORAL DAILY
Status: DISCONTINUED | OUTPATIENT
Start: 2025-04-30 | End: 2025-05-01 | Stop reason: HOSPADM

## 2025-04-30 RX ORDER — SODIUM CHLORIDE 0.9 % (FLUSH) 0.9 %
5-40 SYRINGE (ML) INJECTION PRN
Status: DISCONTINUED | OUTPATIENT
Start: 2025-04-30 | End: 2025-05-01 | Stop reason: HOSPADM

## 2025-04-30 RX ORDER — LISINOPRIL 10 MG/1
10 TABLET ORAL DAILY
Status: DISCONTINUED | OUTPATIENT
Start: 2025-04-30 | End: 2025-05-01 | Stop reason: HOSPADM

## 2025-04-30 RX ORDER — ENOXAPARIN SODIUM 100 MG/ML
60 INJECTION SUBCUTANEOUS 2 TIMES DAILY
Status: DISCONTINUED | OUTPATIENT
Start: 2025-04-30 | End: 2025-04-30

## 2025-04-30 RX ORDER — SODIUM CHLORIDE 9 MG/ML
INJECTION, SOLUTION INTRAVENOUS PRN
Status: DISCONTINUED | OUTPATIENT
Start: 2025-04-30 | End: 2025-05-01 | Stop reason: HOSPADM

## 2025-04-30 RX ORDER — ATORVASTATIN CALCIUM 40 MG/1
40 TABLET, FILM COATED ORAL DAILY
Status: ON HOLD | COMMUNITY
Start: 2025-04-16

## 2025-04-30 RX ADMIN — SODIUM CHLORIDE, PRESERVATIVE FREE 10 ML: 5 INJECTION INTRAVENOUS at 21:15

## 2025-04-30 RX ADMIN — IPRATROPIUM BROMIDE AND ALBUTEROL SULFATE 1 DOSE: 2.5; .5 SOLUTION RESPIRATORY (INHALATION) at 10:02

## 2025-04-30 RX ADMIN — MUPIROCIN: 20 OINTMENT TOPICAL at 21:15

## 2025-04-30 RX ADMIN — SULFUR HEXAFLUORIDE 2 ML: KIT at 14:08

## 2025-04-30 RX ADMIN — METHYLPREDNISOLONE SODIUM SUCCINATE 125 MG: 125 INJECTION INTRAMUSCULAR; INTRAVENOUS at 10:02

## 2025-04-30 RX ADMIN — GUAIFENESIN 600 MG: 600 TABLET, EXTENDED RELEASE ORAL at 21:15

## 2025-04-30 RX ADMIN — SODIUM CHLORIDE 1000 MG: 9 INJECTION, SOLUTION INTRAVENOUS at 11:41

## 2025-04-30 RX ADMIN — ENOXAPARIN SODIUM 60 MG: 100 INJECTION SUBCUTANEOUS at 14:15

## 2025-04-30 RX ADMIN — FUROSEMIDE 20 MG: 10 INJECTION, SOLUTION INTRAMUSCULAR; INTRAVENOUS at 19:13

## 2025-04-30 RX ADMIN — GUAIFENESIN 600 MG: 600 TABLET, EXTENDED RELEASE ORAL at 13:29

## 2025-04-30 RX ADMIN — FUROSEMIDE 20 MG: 10 INJECTION, SOLUTION INTRAMUSCULAR; INTRAVENOUS at 13:28

## 2025-04-30 RX ADMIN — ENOXAPARIN SODIUM 60 MG: 100 INJECTION SUBCUTANEOUS at 21:15

## 2025-04-30 RX ADMIN — DOXYCYCLINE 100 MG: 100 INJECTION, POWDER, LYOPHILIZED, FOR SOLUTION INTRAVENOUS at 12:12

## 2025-04-30 ASSESSMENT — PAIN - FUNCTIONAL ASSESSMENT: PAIN_FUNCTIONAL_ASSESSMENT: NONE - DENIES PAIN

## 2025-04-30 NOTE — ED TRIAGE NOTES
Pt here with the complaint of navel was bleeding this morning.  Not bleeding at this time.  Pt denies having any sob or copd.  Stopped smoking  years ago however arrives with oxygen at 86% and is requiring 2 liters at this time.

## 2025-04-30 NOTE — DISCHARGE INSTRUCTIONS
Umbilical hernia care instructions  -Clean inside of belly button with soap and water  -Dry well  -Place polysporin on dry gauze and apply to small wound inside the belly button  -Change daily and as needed if gauze becomes saturated or soiled                  Heart Failure Resources:  Heart Failure Interactive Workbook:  Go to https://[employer]/publication/?i=571864 for a Free Heart Failure Interactive Workbook provided by The American Heart Association. This interactive workbook will provide information on Healthier Living with Heart Failure. Please copy and paste link into search bar. Use your mouse to scroll through the pages.    HF Cove yi:   Heart Failure Free smart phone yi available for iPhone and Android download. Use your phone to track sodium intake, fluid intake, symptoms, and weight.     Low Sodium Diet / Recipes:  Go to www.[employer] website for “renal” diet which is Low Sodium! [employer] is a dialysis company, but this website offers free seasonal cookbooks. Each quarter, they will release 25-30 new recipes with a breakdown of calories, sodium, and glucose. You can also go to [employer]/recipes website for free recipes.     Discharge Instruction Video:  Scan the QR code below with your camera and click the canva.com link to open the video and watch educational information on Heart Failure and Medications from one of our nurses.   https://www.[employer]/design/DAFZnsH_JRk/7HrattbNPKBwjDSawL5qbx/edit    Home Exercise Program:   Identification of Green/Yellow/Red zones:  You should be able to identify when you feel good (green zone), if you have 1-2 symptoms of HF (yellow zone), or if you are in need of medical attention (red zone).  In your CHF education folder you were provided a “stop light tool” to outline this information.     We want to you to rate your exertion levels:    Our therapy team has discussed means of identification with you such as the \"Magdy scale.\"  The Magdy

## 2025-04-30 NOTE — H&P
Hospital Medicine History & Physical      PCP: Ashia Davis PA-C    Date of Admission: 4/30/2025    Date of Service: Pt seen/examined on 04/30/25    Chief Complaint:    Chief Complaint   Patient presents with    OTHER         History Of Present Illness:      The patient is a 55 y.o. male with a PMHx of hypertension, hyperlipidemia, COPD, type II DM, obesity who presents to [facility] with  umbilicus area bleed that comes and goes. He woke up with blood noticed on umbilical hernia region and comes for eval While in the ED, patient was incidentally found to be hypoxic at 86% on RA and cxr was done showing atelectasis. VBG subsequently showed mild acidosis at Ph of 7.31 and co2 of 55 . Pt reports he cannot lay flat while sleeping with sob and smothering sensation. He also has chronic back pain as well. He usually goes to his car to sleep   He denies any sob this am, no fevers or cough .  But reports he has exertional dyspnea and cannot make few steps without stopping .   No previous cardiac hx but never had workup  . No previous JOLANTA dx . Denies any chest pain when walking. Has chronic LE edema due to sitting in car and sleeping   Does not take any water pills.   Due to mild co2 retention he was placed on bipap and transferred to icu for admission       Past Medical History:        Diagnosis Date    Anxiety     Bilateral primary osteoarthritis of hip 6/30/2020    Bipolar 1 disorder, manic, mild (HCC) 11/8/2016    COPD exacerbation (HCC) 6/7/2020    Gastroesophageal reflux disease without esophagitis 6/3/2016    HTN (hypertension) 8/7/2015    Hyperlipidemia     IBS (irritable bowel syndrome) 3/21/2013    Low back pain 6/30/2020    MDD (major depressive disorder) 10/28/2013    Migraine syndrome 3/21/2013    Morbid obesity (HCC)     Pneumonia     Tobacco abuse        Past Surgical History:        Procedure Laterality Date    CHOLECYSTECTOMY         Medications Prior to Admission:    Prior to Admission medications

## 2025-04-30 NOTE — PROGRESS NOTES
4 Eyes Skin Assessment     NAME:  Francois Louis  YOB: 1970  MEDICAL RECORD NUMBER:  [id number]    The patient is being assessed for  Transfer to New Unit    I agree that at least one RN has performed a thorough Head to Toe Skin Assessment on the patient. ALL assessment sites listed below have been assessed.      Areas assessed by both nurses:    Head, Face, Ears, Shoulders, Back, Chest, Arms, Elbows, Hands, Sacrum. Buttock, Coccyx, Ischium, Legs. Feet and Heels, and Under Medical Devices         Does the Patient have a Wound? No noted wound(s)       Tom Prevention initiated by RN: No  Wound Care Orders initiated by RN: No    Pressure Injury (Stage 3,4, Unstageable, DTI, NWPT, and Complex wounds) if present, place Wound referral order by RN under : No    New Ostomies, if present place, Ostomy referral order under : No     Nurse 1 eSignature: Electronically signed by Trina Alcocer RN on 4/30/25 at 6:13 PM EDT    **SHARE this note so that the co-signing nurse can place an eSignature**    Nurse 2 eSignature: {Esignature:[id number]}

## 2025-04-30 NOTE — FLOWSHEET NOTE
04/30/25 1302   Vitals   Temp 99.1 °F (37.3 °C)   Temp Source Axillary   Pulse 83   Respirations 20   BP (!) 153/87   MAP (Calculated) 109   MAP (mmHg) 105   Oxygen Therapy   SpO2 92 %     Patient moved from ER to ICU. Admission questions completed with the patient. He asked to have his family updated. Francois Louis JR called and updated. He was upset that the patients bleeding from his abdomen was not addressed in the ED. Dr. Cho was at bedside he was updated with the patients son's concerns. New orders for ECHO obtained. Orders to downgrade the patients to 2 west obtained. Patient updated. Electronically signed by Denzel To RN on 4/30/2025 at 2:49 PM

## 2025-04-30 NOTE — PROGRESS NOTES
Consult has been called to Dr. mccullough on 4/30/25. Spoke with sophie. 4:47 PM    Lory Oden  4/30/2025

## 2025-04-30 NOTE — ED PROVIDER NOTES
[facility] MEDICAL-SURGICAL  EMERGENCY DEPARTMENT ENCOUNTER        Pt Name: Francois Louis  MRN: [id number]  Birthdate 1970  Date of evaluation: 4/30/2025  Provider: Randee Marie PA-C  PCP: Ashia Davis PA-C  Note Started: 8:59 AM EDT 4/30/25       I have seen and evaluated this patient with my supervising physician Gabe Alvarez MD.      CHIEF COMPLAINT       Chief Complaint   Patient presents with    OTHER       HISTORY OF PRESENT ILLNESS: 1 or more Elements     History From: Patient    Limitations to history : None    Social Determinants Significantly Affecting Health : None    Chief Complaint: Navel bleeding    Francois Louis is a 55 y.o. male with a PMHx of HLD, COPD, GERD, HTN, who presents to the ED after noticing blood coming from his navel this morning.  He does note that he had a scab on his navel a day ago, and was scratching and picking at it, but did not note any blood at this time.  This morning he woke up and noticed blood, but he is unable to see his navel due to body habitus, and was concerned with the amount of blood.  Hemostasis was achieved prior to arriving to the ED. He does report mild increase in shortness of breath with a cough. He has not been fatigued, weak, no chest pain, no fevers, hematuria, dysuria, urinary hesitancy, back pain, abdominal pain, nausea, vomiting.  He does not usually require oxygen at baseline, but is requiring oxygen in the ED due to hypoxia    Nursing Notes were all reviewed and agreed with or any disagreements were addressed in the HPI.    REVIEW OF SYSTEMS :      Review of Systems    Positives and Pertinent negatives as per HPI.     SURGICAL HISTORY     Past Surgical History:   Procedure Laterality Date    CHOLECYSTECTOMY         CURRENTMEDICATIONS       Current Discharge Medication List        CONTINUE these medications which have NOT CHANGED    Details   !! atorvastatin (LIPITOR) 40 MG tablet Take 1 tablet by mouth daily      diclofenac sodium

## 2025-04-30 NOTE — ACP (ADVANCE CARE PLANNING)
Advance Care Planning     General Advance Care Planning (ACP) Conversation    Date of Conversation: 4/30/2025  Conducted with: Patient with Decision Making Capacity  Other persons present: None    Healthcare Decision Maker: No healthcare decision makers have been documented.     Today we documented Decision Maker(s) consistent with Legal Next of Kin hierarchy.  Content/Action Overview:  DECLINED ACP Conversation - will revisit periodically  Reviewed DNR/DNI and patient elects Full Code (Attempt Resuscitation)    Patient has six children and is okay with all six children making POA decisions for him. Declined offer to choose POA.      Length of Voluntary ACP Conversation in minutes:  <16 minutes (Non-Billable)    Teresa Boeck, RN

## 2025-04-30 NOTE — ED NOTES
Respiratory called to come place the patient on BIPAP. The patient is on the bedside cardiac monitor with alarms on and audible.

## 2025-04-30 NOTE — CARE COORDINATION
Case Management Assessment  Initial Evaluation    Date/Time of Evaluation: 4/30/2025 5:16 PM  Assessment Completed by: Teresa Boeck, RN    If patient is discharged prior to next notation, then this note serves as note for discharge by case management.    Patient Name: Francois Louis                   YOB: 1970  Diagnosis: Respiratory failure with hypoxia (HCC) [J96.91]                   Date / Time: 4/30/2025  8:46 AM    Patient Admission Status: Inpatient   Readmission Risk (Low < 19, Mod (19-27), High > 27): Readmission Risk Score: 13.9    Current PCP: Ashia Davis PA-C  PCP verified by CM? Yes    Chart Reviewed: Yes      History Provided by: Patient  Patient Orientation: Alert and Oriented, Person, Place, Situation    Patient Cognition: Alert    Hospitalization in the last 30 days (Readmission):  No    If yes, Readmission Assessment in CM Navigator will be completed.    Advance Directives:      Code Status: Full Code   Patient's Primary Decision Maker is: Legal Next of Kin (six children)      Discharge Planning:    Patient lives with: Alone Type of Home: Apartment  Primary Care Giver: Self (Friend temporarily staying with pt but normally lives alone.)  Patient Support Systems include:     Current Financial resources: Medicaid  Current community resources: None  Current services prior to admission: Durable Medical Equipment, Other (Comment) (Has a rollator)            Current DME: Walker (Rollator but pt is too large for Rollator)            Type of Home Care services:  None    ADLS  Prior functional level: Independent in ADLs/IADLs  Current functional level: Independent in ADLs/IADLs    PT AM-PAC:   /24  OT AM-PAC:   /24    Family can provide assistance at DC: Yes  Would you like Case Management to discuss the discharge plan with any other family members/significant others, and if so, who? No  Plans to Return to Present Housing: Yes  Other Identified Issues/Barriers to RETURNING to current housing:

## 2025-04-30 NOTE — PROGRESS NOTES
Report given @ bedside to Nidhi DAVIS, for transferral of care. Pt resting in bed. Call light in reach.

## 2025-04-30 NOTE — PROGRESS NOTES
04/30/25 1159   NIV Type   Mode Bilevel   Mask Type Full face mask   Mask Size Large   Assessment   Respirations 20   SpO2 95 %   Settings/Measurements   IPAP 12 cmH20   CPAP/EPAP 6 cmH2O   Vt (Measured) 920 mL   Rate Ordered 14   FiO2  40 %

## 2025-05-01 VITALS
TEMPERATURE: 98.1 F | BODY MASS INDEX: 46.65 KG/M2 | SYSTOLIC BLOOD PRESSURE: 141 MMHG | OXYGEN SATURATION: 92 % | DIASTOLIC BLOOD PRESSURE: 75 MMHG | WEIGHT: 315 LBS | RESPIRATION RATE: 20 BRPM | HEIGHT: 69 IN | HEART RATE: 103 BPM

## 2025-05-01 PROBLEM — J44.9 CHRONIC OBSTRUCTIVE PULMONARY DISEASE (HCC): Status: ACTIVE | Noted: 2020-06-07

## 2025-05-01 LAB
ANION GAP SERPL CALCULATED.3IONS-SCNC: 10 MMOL/L (ref 3–16)
BASOPHILS # BLD: 0.1 K/UL (ref 0–0.2)
BASOPHILS NFR BLD: 0.6 %
BUN SERPL-MCNC: 26 MG/DL (ref 7–20)
CALCIUM SERPL-MCNC: 8.8 MG/DL (ref 8.3–10.6)
CHLORIDE SERPL-SCNC: 102 MMOL/L (ref 99–110)
CO2 SERPL-SCNC: 28 MMOL/L (ref 21–32)
CREAT SERPL-MCNC: 0.7 MG/DL (ref 0.9–1.3)
DEPRECATED RDW RBC AUTO: 15.4 % (ref 12.4–15.4)
EOSINOPHIL # BLD: 0 K/UL (ref 0–0.6)
EOSINOPHIL NFR BLD: 0.1 %
GFR SERPLBLD CREATININE-BSD FMLA CKD-EPI: >90 ML/MIN/{1.73_M2}
GLUCOSE BLD-MCNC: 108 MG/DL (ref 70–99)
GLUCOSE BLD-MCNC: 114 MG/DL (ref 70–99)
GLUCOSE SERPL-MCNC: 116 MG/DL (ref 70–99)
HCT VFR BLD AUTO: 46.6 % (ref 40.5–52.5)
HGB BLD-MCNC: 14.9 G/DL (ref 13.5–17.5)
LYMPHOCYTES # BLD: 1.7 K/UL (ref 1–5.1)
LYMPHOCYTES NFR BLD: 13.7 %
MCH RBC QN AUTO: 29.3 PG (ref 26–34)
MCHC RBC AUTO-ENTMCNC: 32.1 G/DL (ref 31–36)
MCV RBC AUTO: 91.3 FL (ref 80–100)
MONOCYTES # BLD: 1.1 K/UL (ref 0–1.3)
MONOCYTES NFR BLD: 8.4 %
NEUTROPHILS # BLD: 9.8 K/UL (ref 1.7–7.7)
NEUTROPHILS NFR BLD: 77.2 %
PERFORMED ON: ABNORMAL
PERFORMED ON: ABNORMAL
PLATELET # BLD AUTO: 239 K/UL (ref 135–450)
PMV BLD AUTO: 8 FL (ref 5–10.5)
POTASSIUM SERPL-SCNC: 4.5 MMOL/L (ref 3.5–5.1)
RBC # BLD AUTO: 5.1 M/UL (ref 4.2–5.9)
SODIUM SERPL-SCNC: 140 MMOL/L (ref 136–145)
WBC # BLD AUTO: 12.7 K/UL (ref 4–11)

## 2025-05-01 PROCEDURE — 2500000003 HC RX 250 WO HCPCS: Performed by: INTERNAL MEDICINE

## 2025-05-01 PROCEDURE — 80048 BASIC METABOLIC PNL TOTAL CA: CPT

## 2025-05-01 PROCEDURE — 6360000002 HC RX W HCPCS: Performed by: INTERNAL MEDICINE

## 2025-05-01 PROCEDURE — 2700000000 HC OXYGEN THERAPY PER DAY

## 2025-05-01 PROCEDURE — 6370000000 HC RX 637 (ALT 250 FOR IP)

## 2025-05-01 PROCEDURE — 6370000000 HC RX 637 (ALT 250 FOR IP): Performed by: INTERNAL MEDICINE

## 2025-05-01 PROCEDURE — 94660 CPAP INITIATION&MGMT: CPT

## 2025-05-01 PROCEDURE — 94761 N-INVAS EAR/PLS OXIMETRY MLT: CPT

## 2025-05-01 PROCEDURE — 85025 COMPLETE CBC W/AUTO DIFF WBC: CPT

## 2025-05-01 PROCEDURE — APPSS30 APP SPLIT SHARED TIME 16-30 MINUTES

## 2025-05-01 PROCEDURE — 36415 COLL VENOUS BLD VENIPUNCTURE: CPT

## 2025-05-01 RX ORDER — BACITRACIN ZINC AND POLYMYXIN B SULFATE 500; 1000 [USP'U]/G; [USP'U]/G
OINTMENT TOPICAL DAILY
Status: DISCONTINUED | OUTPATIENT
Start: 2025-05-01 | End: 2025-05-01 | Stop reason: HOSPADM

## 2025-05-01 RX ORDER — BACITRACIN ZINC AND POLYMYXIN B SULFATE 500; 1000 [USP'U]/G; [USP'U]/G
OINTMENT TOPICAL
Qty: 30 G | Refills: 0 | Status: ON HOLD | OUTPATIENT
Start: 2025-05-01 | End: 2025-05-08

## 2025-05-01 RX ADMIN — LISINOPRIL 10 MG: 10 TABLET ORAL at 09:19

## 2025-05-01 RX ADMIN — BACITRACIN ZINC AND POLYMYXIN B SULFATE: at 10:19

## 2025-05-01 RX ADMIN — GUAIFENESIN 600 MG: 600 TABLET, EXTENDED RELEASE ORAL at 09:19

## 2025-05-01 RX ADMIN — ATORVASTATIN CALCIUM 40 MG: 40 TABLET, FILM COATED ORAL at 09:19

## 2025-05-01 RX ADMIN — MUPIROCIN: 20 OINTMENT TOPICAL at 09:24

## 2025-05-01 RX ADMIN — ENOXAPARIN SODIUM 60 MG: 100 INJECTION SUBCUTANEOUS at 09:19

## 2025-05-01 RX ADMIN — FUROSEMIDE 20 MG: 10 INJECTION, SOLUTION INTRAMUSCULAR; INTRAVENOUS at 09:19

## 2025-05-01 RX ADMIN — SODIUM CHLORIDE, PRESERVATIVE FREE 10 ML: 5 INJECTION INTRAVENOUS at 09:22

## 2025-05-01 NOTE — PROGRESS NOTES
Pulmonary Progress Note  CC: suspected JOLANTA    Subjective:    Oxygen      EXAM: BP (!) 134/92   Pulse (!) 102   Temp 98 °F (36.7 °C) (Oral)   Resp 22   Ht 1.753 m (5' 9\")   Wt (!) 176.9 kg (390 lb)   SpO2 90%   BMI 57.59 kg/m²  on 2L  Constitutional:  No acute distress.   Eyes: PERRL. Conjunctivae anicteric.   ENT: Normal nose. Normal tongue.    Neck:  Trachea is midline.   Respiratory: No accessory muscle usage. Decreased breath sounds. No wheezes. No rales. No Rhonchi.  Cardiovascular: Normal S1S2. No digit clubbing. No digit cyanosis. + LE edema.   Psychiatric: No anxiety or Agitation. Alert and Oriented to person, place and time.    Scheduled Meds:   bacitracin-polymyxin b   Topical Daily    atorvastatin  40 mg Oral Daily    lisinopril  10 mg Oral Daily    sodium chloride flush  5-40 mL IntraVENous 2 times per day    guaiFENesin  600 mg Oral BID    insulin lispro  0-4 Units SubCUTAneous 4x Daily AC & HS    furosemide  20 mg IntraVENous BID    enoxaparin  60 mg SubCUTAneous BID    mupirocin   Each Nostril BID     Continuous Infusions:   dextrose      sodium chloride       PRN Meds:  glucose, dextrose bolus **OR** dextrose bolus, glucagon (rDNA), dextrose, sodium chloride flush, sodium chloride, ondansetron **OR** ondansetron, polyethylene glycol, acetaminophen **OR** acetaminophen, benzonatate, sulfur hexafluoride microspheres    Labs:  CBC:   Recent Labs     04/30/25  1025 05/01/25  0623   WBC 9.3 12.7*   HGB 15.2 14.9   HCT 46.7 46.6   MCV 90.3 91.3    239     BMP:   Recent Labs     04/30/25  1025 05/01/25  0623    140   K 4.8 4.5    102   CO2 28 28   BUN 18 26*   CREATININE 0.6* 0.7*       Chest imaging was reviewed by me and showed CXR 4/30/25:  Bibasilar airspace infiltrates which could represent a combination of pneumonia and atelectasis     ASSESSMENT:  Hypoxia while asleep: suspected JOLANTA  LE edema  Atelectasis more likely than pneumonia on CXR     PLAN:  Supplemental O2  The pt

## 2025-05-01 NOTE — PROGRESS NOTES
Pt in bed during assessment. Pt denies pain at this time. Pt denies shortness of breath. Pt accepting of morning medications. No new concerns at this time. Bed alarm in place. Call light in reach. Vitals stable.     Bedside Mobility Assessment Tool (BMAT):     Assessment Level 1- Sit and Shake    1. From a semi-reclined position, ask patient to sit up and rotate to a seated position at the side of the bed. Can use the bedrail.    2. Ask patient to reach out and grab your hand and shake making sure patient reaches across his/her midline.   Pass- Patient is able to come to a seated position, maintain core strength. Maintains seated balance while reaching across midline. Move on to Assessment Level 2.     Assessment Level 2- Stretch and Point   1. With patient in seated position at the side of the bed, have patient place both feet on the floor (or stool) with knees no higher than hips.    2. Ask patient to stretch one leg and straighten the knee, then bend the ankle/flex and point the toes. If appropriate, repeat with the other leg.   Pass- Patient is able to demonstrate appropriate quad strength on intended weight bearing limb(s). Move onto Assessment Level 3.     Assessment Level 3- Stand   1. Ask patient to elevate off the bed or chair (seated to standing) using an assistive device (cane, bedrail).    2. Patient should be able to raise buttocks off be and hold for a count of five. May repeat once.   Pass- Patient maintains standing stability for at least 5 seconds, proceed to assessment level 4.    Assessment Level 4- Walk   1. Ask patient to march in place at bedside.    2. Then ask patient to advance step and return each foot. Some medical conditions may render a patient from stepping backwards, use your best clinical judgement.   Fail- Patient not able to complete tasks OR requires use of assistive device. Patient is MOBILITY LEVEL 3.       Mobility Level- 3

## 2025-05-01 NOTE — PROGRESS NOTES
04/30/25 2305   NIV Type   NIV Started/Stopped (S)  On   Equipment Type V60   Mode Bilevel   Mask Type Full face mask   Mask Size Large   Assessment   Pulse 94   Respirations 20   SpO2 96 %   Level of Consciousness 0   Comfort Level Good   Using Accessory Muscles No   Mask Compliance Good   Skin Assessment Clean, dry, & intact   Skin Protection for O2 Device Yes   Settings/Measurements   IPAP 12 cmH20   CPAP/EPAP 6 cmH2O   Vt (Measured) 819 mL   Rate Ordered 14   FiO2  40 %   Minute Volume (L/min) 16.6 Liters   Mask Leak (lpm) 9 lpm   Patient's Home Machine No   Alarm Settings   Alarms On Y

## 2025-05-01 NOTE — PROGRESS NOTES
05/01/25 0304   NIV Type   Equipment Type V60   Mode Bilevel   Mask Type Full face mask   Mask Size Large   Assessment   Pulse 83   Respirations 17   SpO2 95 %   Settings/Measurements   IPAP 12 cmH20   CPAP/EPAP 6 cmH2O   Vt (Measured) 701 mL   Rate Ordered 14   FiO2  40 %   Minute Volume (L/min) 11.6 Liters   Mask Leak (lpm) 1 lpm   Patient's Home Machine No   Alarm Settings   Alarms On Y

## 2025-05-01 NOTE — PROGRESS NOTES
MT called sat down to 87% on 2 LNC while talking on the cell phone. After several minutes increased O2 to 3 LNC. Sat 92-94% at rest in bed. Called RT to place pt on bipap this evening.

## 2025-05-01 NOTE — PROGRESS NOTES
[facility]   COPD PROGRAM      NAME:  Francois Louis  AGE: 55 y.o.   GENDER: male  : 1970  TODAY'S DATE:  2025    Subjective:     VISIT TYPE: Education    ADMIT DATE: 2025    PAST MEDICAL HISTORY:      Diagnosis Date    Anxiety     Bilateral primary osteoarthritis of hip 2020    Bipolar 1 disorder, manic, mild (HCC) 2016    Chronic obstructive pulmonary disease (HCC) 2020    COPD exacerbation ([facility]) 2020    Gastroesophageal reflux disease without esophagitis 6/3/2016    HTN (hypertension) 2015    Hyperlipidemia     IBS (irritable bowel syndrome) 3/21/2013    Low back pain 2020    MDD (major depressive disorder) 10/28/2013    Migraine syndrome 3/21/2013    Morbid obesity ([facility])     Pneumonia     Tobacco abuse      HOME MEDICATIONS:  Prior to Admission medications    Medication Sig Start Date End Date Taking? Authorizing Provider   bacitracin-polymyxin b (POLYSPORIN) [id number] UNIT/GM ointment Apply topically 2 times daily. 25 Yes Gertrudis Castillo MD   atorvastatin (LIPITOR) 40 MG tablet Take 1 tablet by mouth daily 25  Yes Provider, Historical, MD   diclofenac sodium (VOLTAREN) 1 % GEL Apply 4 g topically 4 times daily 3/30/25  Yes Angel Moyer MD   lisinopril (PRINIVIL;ZESTRIL) 10 MG tablet Take 1 tablet by mouth daily 24  Yes Provider, MD Fadia   metFORMIN (GLUCOPHAGE-XR) 500 MG extended release tablet Take by mouth 24  Yes Provider, Historical, MD   acetaminophen (TYLENOL) 500 MG tablet Take 1 tablet by mouth every 4 hours as needed for Pain or Fever 24  Yes Royal Jaramillo MD      Objective:     ADMISSION DIAGNOSIS:   Respiratory failure with hypoxia ([facility]) [J96.91]    CXR Findings:  XR CHEST PORTABLE  Result Date: 2025  Bibasilar airspace infiltrates which could represent a combination of pneumonia and atelectasis       Assessment:     Patient sitting in bed at this time on  2 L O2.  Pt denies

## 2025-05-01 NOTE — DISCHARGE INSTR - DIET

## 2025-05-01 NOTE — PROGRESS NOTES
Provided pt with discharge instructions. Pt understanding of discharge instructions and provided verbal understanding. Vitals stable. No new concerns at this time. Pt transported to main lobby for discharge.

## 2025-05-01 NOTE — CONSULTS
Reason for referral and CC: bleeding from umbilicus    HISTORY OF PRESENT ILLNESS: 54 yo male presented with a c/o some blood from umbilicus where he has a hernia. No bleeding by the time of arrival to ED.    He was hypoxic (while asleep?) in the ED. Not hypoxic currently. Had elevated CO2 - appears chronic. He also has LE edema which he says waxes and wanes.  Chronic LEON but no acute SOB. Chronic cough that is better now than a few weeks ago.  The pt is not able to sleep flat and sleeps in his car so he can be sitting up.    Past Medical History:   Diagnosis Date    Anxiety     Bilateral primary osteoarthritis of hip 6/30/2020    Bipolar 1 disorder, manic, mild (HCC) 11/8/2016    COPD exacerbation (HCC) 6/7/2020    Gastroesophageal reflux disease without esophagitis 6/3/2016    HTN (hypertension) 8/7/2015    Hyperlipidemia     IBS (irritable bowel syndrome) 3/21/2013    Low back pain 6/30/2020    MDD (major depressive disorder) 10/28/2013    Migraine syndrome 3/21/2013    Morbid obesity (HCC)     Pneumonia     Tobacco abuse      Past Surgical History:   Procedure Laterality Date    CHOLECYSTECTOMY       Family History  family history includes Dementia in his mother; Heart Disease in his father.    Social History:  reports that he quit smoking about 11 years ago. His smoking use included cigarettes. He started smoking about 31 years ago. He has a 20 pack-year smoking history. He has never used smokeless tobacco.   reports no history of alcohol use.    ALLERGIES:  Patient is allergic to epinephrine.  Continuous Infusions:   dextrose      sodium chloride       Scheduled Meds:   atorvastatin  40 mg Oral Daily    lisinopril  10 mg Oral Daily    sodium chloride flush  5-40 mL IntraVENous 2 times per day    guaiFENesin  600 mg Oral BID    insulin lispro  0-4 Units SubCUTAneous 4x Daily AC & HS    furosemide  20 mg IntraVENous BID    enoxaparin  60 mg SubCUTAneous BID     PRN Meds:  glucose, dextrose bolus **OR** dextrose 
medical decision making. My findings are as follows:    Patient presents for evaluation of umbilical bleeding.  He saw some blood coming out so came to the emergency room to have it evaluated.  He was found to be hypoxic and admitted for that.  He denies significant pain in his umbilicus.  He has just had a bulge there that started bleeding.  He denies nausea or vomiting.  His bowels been working normally    BP (!) 134/92   Pulse (!) 102   Temp 98 °F (36.7 °C) (Oral)   Resp 22   Ht 1.753 m (5' 9\")   Wt (!) 176.9 kg (390 lb)   SpO2 90%   BMI 57.59 kg/m²   Awake and alert in NAD  RESP: unlabored, no distress  CV: Tachycardic  ABD: Bowel sounds positive, soft, morbidly obese.  He has a prominent incarcerated hernia likely containing fat  SKIN: There is a superficial erosion of the skin overlying the hernia without active bleeding at this time    Chest x-ray shows bibasilar infiltrates    Assessment/plan: Fat-containing incarcerated umbilical hernia.  He has superficial erosion with some bleeding of the overlying skin.  I recommend just good local care to this.  Once the wound is healed, we can discuss surgery but it would generally be recommended the patient lose a fairly significant amount of weight prior to proceeding with repair.  He can follow-up with me in the office      DM [facility] MD

## 2025-05-01 NOTE — PROGRESS NOTES
O2 Sat at rest on room air is 85 %.  O2 Sat at rest with oxygen @  2 lpm is 93%.  O2 Sat with activity with oxygen @ 2 lpm is 91%.        Venlafaxine

## 2025-05-01 NOTE — PROGRESS NOTES
Per orders wound care completed on Umbilical Hernia. Educated pt on how to care for wound at home. Pt expresses concern about being able to take care of it home, this nurse explained to pt he needs to clean it daily and apply polysporin to it. Pt states he understands he needs to care for it at home.

## 2025-05-01 NOTE — CARE COORDINATION
Met with pt at bedside. Pt to dc home today. Family to transport pt home. Pt qualifies for home oxygen. Requested [employer]. Referral called to Kendra who is covering for Shellie at [facility]. Ok for pt to dc with portable concentrator. Kendra to reach out to pt for home delivery of concentrator. Equipment delivered to pt room and updated on expecting a call from [employer]. No further DC or DME needs identified.    IMM- na    O2- 90% 2L

## 2025-05-01 NOTE — DISCHARGE SUMMARY
Physical Exam at Discharge:   BP (!) 134/92   Pulse (!) 102   Temp 98 °F (36.7 °C) (Oral)   Resp 22   Ht 1.753 m (5' 9\")   Wt (!) 176.9 kg (390 lb)   SpO2 90%   BMI 57.59 kg/m²   General:  middle aged obese male up in ICU bed   Awake, alert and oriented. Appears to be not in any distress  Mucous Membranes:  Pink , anicteric  Neck: No JVD, no carotid bruit, no thyromegaly  Short neck   Chest:  Clear to auscultation bilaterally, diminished in bases with crackles  Cardiovascular:  RRR S1S2 heard, no murmurs or gallops  Abdomen:  Soft, morbidly obese,  undistended, non tender, no organomegaly, BS present  Small umbilical hernia with dried blood stains  Extremities: 2+ pretty Le edema  Distal pulses well felt  Neurological : grossly normal- non focal       Hospital Course  Acute hypoxia  Obesity hypoventilation syndrome with suspected JOLANTA  - pt coming for umbilical hernia bleed but noted to be hypoxic and hypercarbic   - suspect underlying untreated JOLANTA with chronic co2 retention   - quickly improved with bipap , now on RA  -requiring BiPAP in ED due to respiratory acidosis with hypercapnia.   -CXR with bibasilar airspace infiltrates, combination of pneumonia or atelectasis.   - pt with no symptoms of pna, no previous copd dx and does not smoke  - stop abx, steroids, HHN  - pulmonary consult for JOLANTA eval   - weight loss counselling  - ECHo to assess for right heart function -normal.  Home O@ arranged. Had a face to face conversation with patient regarding home O2     LE edema  - likely dependant or nsaid use - cannot rule out right heart failure  - bnp wnl   - start lasix 20 mg bid, check Echo as above     Umbilical hernia.  -seen by general surgery.  -no inpatient surgical intervention planned.   -local wound care.  -follow up with Dr. Pearce in 2 weeks.     Hypertension.  -continue home lisinopril.      Hyperlipidemia.   -continue statin.      Type II diabetes mellitus.  -A1C from 2/2025 of 6.9.  -holding home

## 2025-05-01 NOTE — PROGRESS NOTES
[facility]   HEART FAILURE PROGRAM      NAME:  Francois Louis  AGE: 55 y.o.   GENDER: male  : 1970  TODAY'S DATE:  2025    Subjective:     VISIT TYPE: Education    ADMIT DATE: 2025    PAST MEDICAL HISTORY:      Diagnosis Date    Anxiety     Bilateral primary osteoarthritis of hip 2020    Bipolar 1 disorder, manic, mild (HCC) 2016    Chronic obstructive pulmonary disease (HCC) 2020    COPD exacerbation ([facility]) 2020    Gastroesophageal reflux disease without esophagitis 6/3/2016    HTN (hypertension) 2015    Hyperlipidemia     IBS (irritable bowel syndrome) 3/21/2013    Low back pain 2020    MDD (major depressive disorder) 10/28/2013    Migraine syndrome 3/21/2013    Morbid obesity ([facility])     Pneumonia     Tobacco abuse      HOME MEDICATIONS:  Prior to Admission medications    Medication Sig Start Date End Date Taking? Authorizing Provider   bacitracin-polymyxin b (POLYSPORIN) [id number] UNIT/GM ointment Apply topically 2 times daily. 25 Yes Gertrudis Castillo MD   atorvastatin (LIPITOR) 40 MG tablet Take 1 tablet by mouth daily 25  Yes Provider, MD Fadia   diclofenac sodium (VOLTAREN) 1 % GEL Apply 4 g topically 4 times daily 3/30/25  Yes Angel Moyer MD   lisinopril (PRINIVIL;ZESTRIL) 10 MG tablet Take 1 tablet by mouth daily 24  Yes Provider, MD Fadia   metFORMIN (GLUCOPHAGE-XR) 500 MG extended release tablet Take by mouth 24  Yes Provider, Historical, MD   acetaminophen (TYLENOL) 500 MG tablet Take 1 tablet by mouth every 4 hours as needed for Pain or Fever 24  Yes Royal Jaramillo MD      Objective:     ADMISSION DIAGNOSIS:   Respiratory failure with hypoxia ([facility]) [J96.91]    WEIGHTS:    Admission weight: (!) 176.9 kg (390 lb)   Wt Readings from Last 3 Encounters:   25 (!) 176.9 kg (390 lb)   25 (!) 180.5 kg (398 lb)   25 (!) 180.5 kg (398 lb)     INTAKE & OUTPUT:   Intake/Output

## 2025-05-01 NOTE — PLAN OF CARE
Problem: Discharge Planning  Goal: Discharge to home or other facility with appropriate resources  5/1/2025 1011 by Cuca Eduardo RN  Outcome: Progressing  5/1/2025 0150 by Nidhi Powell RN  Outcome: Progressing     Problem: Safety - Adult  Goal: Free from fall injury  5/1/2025 1011 by Cuca Eduardo RN  Outcome: Progressing  5/1/2025 0150 by Nidhi Powell RN  Outcome: Progressing     Problem: ABCDS Injury Assessment  Goal: Absence of physical injury  5/1/2025 1011 by Cuca Eduardo RN  Outcome: Progressing  5/1/2025 0150 by Nidhi Powell RN  Outcome: Progressing     Problem: Chronic Conditions and Co-morbidities  Goal: Patient's chronic conditions and co-morbidity symptoms are monitored and maintained or improved  5/1/2025 1011 by Cuca Eduardo RN  Outcome: Progressing  5/1/2025 0150 by Nidhi Powell RN  Outcome: Progressing     Problem: Pain  Goal: Verbalizes/displays adequate comfort level or baseline comfort level  5/1/2025 1011 by Cuca Eduardo RN  Outcome: Progressing  5/1/2025 0150 by Nidhi Powell RN  Outcome: Progressing

## 2025-05-03 ENCOUNTER — APPOINTMENT (OUTPATIENT)
Dept: CT IMAGING | Age: 55
DRG: 133 | End: 2025-05-03
Payer: COMMERCIAL

## 2025-05-03 ENCOUNTER — HOSPITAL ENCOUNTER (INPATIENT)
Age: 55
LOS: 2 days | Discharge: HOME | DRG: 133 | End: 2025-05-05
Attending: EMERGENCY MEDICINE | Admitting: INTERNAL MEDICINE
Payer: COMMERCIAL

## 2025-05-03 ENCOUNTER — APPOINTMENT (OUTPATIENT)
Dept: GENERAL RADIOLOGY | Age: 55
DRG: 133 | End: 2025-05-03
Payer: COMMERCIAL

## 2025-05-03 DIAGNOSIS — J96.21 ACUTE ON CHRONIC RESPIRATORY FAILURE WITH HYPOXIA AND HYPERCAPNIA (HCC): Primary | ICD-10-CM

## 2025-05-03 DIAGNOSIS — J96.22 ACUTE ON CHRONIC RESPIRATORY FAILURE WITH HYPOXIA AND HYPERCAPNIA (HCC): Primary | ICD-10-CM

## 2025-05-03 PROBLEM — J96.00 ACUTE RESPIRATORY FAILURE (HCC): Status: ACTIVE | Noted: 2025-05-03

## 2025-05-03 LAB
ALBUMIN SERPL-MCNC: 3.6 G/DL (ref 3.4–5)
ALBUMIN/GLOB SERPL: 1.4 {RATIO} (ref 1.1–2.2)
ALP SERPL-CCNC: 70 U/L (ref 40–129)
ALT SERPL-CCNC: 33 U/L (ref 10–40)
ANION GAP SERPL CALCULATED.3IONS-SCNC: 11 MMOL/L (ref 3–16)
AST SERPL-CCNC: 39 U/L (ref 15–37)
BASE EXCESS BLDV CALC-SCNC: 3.2 MMOL/L (ref -3–3)
BASE EXCESS BLDV CALC-SCNC: 3.9 MMOL/L (ref -3–3)
BASE EXCESS BLDV CALC-SCNC: 4.3 MMOL/L (ref -3–3)
BASOPHILS # BLD: 0.1 K/UL (ref 0–0.2)
BASOPHILS NFR BLD: 0.8 %
BILIRUB SERPL-MCNC: 0.3 MG/DL (ref 0–1)
BUN SERPL-MCNC: 18 MG/DL (ref 7–20)
CALCIUM SERPL-MCNC: 8.7 MG/DL (ref 8.3–10.6)
CHLORIDE SERPL-SCNC: 101 MMOL/L (ref 99–110)
CO2 BLDV-SCNC: 31 MMOL/L
CO2 BLDV-SCNC: 34 MMOL/L
CO2 BLDV-SCNC: 36 MMOL/L
CO2 SERPL-SCNC: 29 MMOL/L (ref 21–32)
COHGB MFR BLDV: 1.2 % (ref 0–1.5)
COHGB MFR BLDV: 1.7 % (ref 0–1.5)
COHGB MFR BLDV: 3.3 % (ref 0–1.5)
CREAT SERPL-MCNC: 0.7 MG/DL (ref 0.9–1.3)
D-DIMER QUANTITATIVE: <0.27 UG/ML FEU (ref 0–0.6)
DEPRECATED RDW RBC AUTO: 15 % (ref 12.4–15.4)
EOSINOPHIL # BLD: 0.1 K/UL (ref 0–0.6)
EOSINOPHIL NFR BLD: 1.4 %
GFR SERPLBLD CREATININE-BSD FMLA CKD-EPI: >90 ML/MIN/{1.73_M2}
GLUCOSE BLD-MCNC: 142 MG/DL (ref 70–99)
GLUCOSE SERPL-MCNC: 117 MG/DL (ref 70–99)
HCO3 BLDV-SCNC: 29.6 MMOL/L (ref 23–29)
HCO3 BLDV-SCNC: 31.7 MMOL/L (ref 23–29)
HCO3 BLDV-SCNC: 33.5 MMOL/L (ref 23–29)
HCT VFR BLD AUTO: 45.7 % (ref 40.5–52.5)
HGB BLD-MCNC: 15.1 G/DL (ref 13.5–17.5)
LACTATE BLDV-SCNC: 1.9 MMOL/L (ref 0.4–1.9)
LIPASE SERPL-CCNC: 19 U/L (ref 13–60)
LYMPHOCYTES # BLD: 1.6 K/UL (ref 1–5.1)
LYMPHOCYTES NFR BLD: 17 %
MCH RBC QN AUTO: 29.6 PG (ref 26–34)
MCHC RBC AUTO-ENTMCNC: 33 G/DL (ref 31–36)
MCV RBC AUTO: 89.7 FL (ref 80–100)
METHGB MFR BLDV: 0 %
METHGB MFR BLDV: 0 %
METHGB MFR BLDV: 0.2 %
MONOCYTES # BLD: 0.9 K/UL (ref 0–1.3)
MONOCYTES NFR BLD: 9.9 %
NEUTROPHILS # BLD: 6.6 K/UL (ref 1.7–7.7)
NEUTROPHILS NFR BLD: 70.9 %
NT-PROBNP SERPL-MCNC: 41 PG/ML (ref 0–124)
O2 CT VFR BLDV CALC: 16 VOL %
O2 CT VFR BLDV CALC: 17 VOL %
O2 CT VFR BLDV CALC: 21 VOL %
O2 THERAPY: ABNORMAL
PCO2 BLDV: 51.3 MMHG (ref 40–50)
PCO2 BLDV: 61 MMHG (ref 40–50)
PCO2 BLDV: 70.5 MMHG (ref 40–50)
PERFORMED ON: ABNORMAL
PH BLDV: 7.29 [PH] (ref 7.35–7.45)
PH BLDV: 7.33 [PH] (ref 7.35–7.45)
PH BLDV: 7.38 [PH] (ref 7.35–7.45)
PLATELET # BLD AUTO: 233 K/UL (ref 135–450)
PMV BLD AUTO: 8.2 FL (ref 5–10.5)
PO2 BLDV: 43.4 MMHG (ref 25–40)
PO2 BLDV: 43.5 MMHG (ref 25–40)
PO2 BLDV: 72.7 MMHG (ref 25–40)
POTASSIUM SERPL-SCNC: 4.1 MMOL/L (ref 3.5–5.1)
PROT SERPL-MCNC: 6.1 G/DL (ref 6.4–8.2)
RBC # BLD AUTO: 5.09 M/UL (ref 4.2–5.9)
SAO2 % BLDV: 72 %
SAO2 % BLDV: 75 %
SAO2 % BLDV: 94 %
SODIUM SERPL-SCNC: 141 MMOL/L (ref 136–145)
TROPONIN, HIGH SENSITIVITY: 19 NG/L (ref 0–22)
WBC # BLD AUTO: 9.3 K/UL (ref 4–11)

## 2025-05-03 PROCEDURE — 99285 EMERGENCY DEPT VISIT HI MDM: CPT

## 2025-05-03 PROCEDURE — 83690 ASSAY OF LIPASE: CPT

## 2025-05-03 PROCEDURE — 5A09457 ASSISTANCE WITH RESPIRATORY VENTILATION, 24-96 CONSECUTIVE HOURS, CONTINUOUS POSITIVE AIRWAY PRESSURE: ICD-10-PCS | Performed by: INTERNAL MEDICINE

## 2025-05-03 PROCEDURE — 6360000002 HC RX W HCPCS: Performed by: EMERGENCY MEDICINE

## 2025-05-03 PROCEDURE — 2500000003 HC RX 250 WO HCPCS: Performed by: NURSE PRACTITIONER

## 2025-05-03 PROCEDURE — 85379 FIBRIN DEGRADATION QUANT: CPT

## 2025-05-03 PROCEDURE — 2000000000 HC ICU R&B

## 2025-05-03 PROCEDURE — 36415 COLL VENOUS BLD VENIPUNCTURE: CPT

## 2025-05-03 PROCEDURE — 94660 CPAP INITIATION&MGMT: CPT

## 2025-05-03 PROCEDURE — 71045 X-RAY EXAM CHEST 1 VIEW: CPT

## 2025-05-03 PROCEDURE — 85025 COMPLETE CBC W/AUTO DIFF WBC: CPT

## 2025-05-03 PROCEDURE — 6370000000 HC RX 637 (ALT 250 FOR IP)

## 2025-05-03 PROCEDURE — 2500000003 HC RX 250 WO HCPCS: Performed by: EMERGENCY MEDICINE

## 2025-05-03 PROCEDURE — 83880 ASSAY OF NATRIURETIC PEPTIDE: CPT

## 2025-05-03 PROCEDURE — 6370000000 HC RX 637 (ALT 250 FOR IP): Performed by: HOSPITALIST

## 2025-05-03 PROCEDURE — 74176 CT ABD & PELVIS W/O CONTRAST: CPT

## 2025-05-03 PROCEDURE — 82803 BLOOD GASES ANY COMBINATION: CPT

## 2025-05-03 PROCEDURE — 2700000000 HC OXYGEN THERAPY PER DAY

## 2025-05-03 PROCEDURE — 83605 ASSAY OF LACTIC ACID: CPT

## 2025-05-03 PROCEDURE — 96375 TX/PRO/DX INJ NEW DRUG ADDON: CPT

## 2025-05-03 PROCEDURE — 93005 ELECTROCARDIOGRAM TRACING: CPT | Performed by: EMERGENCY MEDICINE

## 2025-05-03 PROCEDURE — 96374 THER/PROPH/DIAG INJ IV PUSH: CPT

## 2025-05-03 PROCEDURE — 80053 COMPREHEN METABOLIC PANEL: CPT

## 2025-05-03 PROCEDURE — 6370000000 HC RX 637 (ALT 250 FOR IP): Performed by: EMERGENCY MEDICINE

## 2025-05-03 PROCEDURE — 94761 N-INVAS EAR/PLS OXIMETRY MLT: CPT

## 2025-05-03 PROCEDURE — 84484 ASSAY OF TROPONIN QUANT: CPT

## 2025-05-03 PROCEDURE — 6360000002 HC RX W HCPCS: Performed by: NURSE PRACTITIONER

## 2025-05-03 RX ORDER — GLUCAGON 1 MG/ML
1 KIT INJECTION PRN
Status: DISCONTINUED | OUTPATIENT
Start: 2025-05-03 | End: 2025-05-05 | Stop reason: HOSPADM

## 2025-05-03 RX ORDER — POTASSIUM CHLORIDE 1500 MG/1
40 TABLET, EXTENDED RELEASE ORAL PRN
Status: DISCONTINUED | OUTPATIENT
Start: 2025-05-03 | End: 2025-05-05 | Stop reason: HOSPADM

## 2025-05-03 RX ORDER — SODIUM CHLORIDE 0.9 % (FLUSH) 0.9 %
5-40 SYRINGE (ML) INJECTION PRN
Status: DISCONTINUED | OUTPATIENT
Start: 2025-05-03 | End: 2025-05-05 | Stop reason: HOSPADM

## 2025-05-03 RX ORDER — MORPHINE SULFATE 4 MG/ML
4 INJECTION, SOLUTION INTRAMUSCULAR; INTRAVENOUS
Status: DISCONTINUED | OUTPATIENT
Start: 2025-05-03 | End: 2025-05-03

## 2025-05-03 RX ORDER — INSULIN LISPRO 100 [IU]/ML
0.05 INJECTION, SOLUTION INTRAVENOUS; SUBCUTANEOUS
Status: DISCONTINUED | OUTPATIENT
Start: 2025-05-04 | End: 2025-05-05 | Stop reason: HOSPADM

## 2025-05-03 RX ORDER — ONDANSETRON 4 MG/1
4 TABLET, ORALLY DISINTEGRATING ORAL EVERY 8 HOURS PRN
Status: DISCONTINUED | OUTPATIENT
Start: 2025-05-03 | End: 2025-05-05 | Stop reason: HOSPADM

## 2025-05-03 RX ORDER — ACETAMINOPHEN 325 MG/1
650 TABLET ORAL EVERY 6 HOURS PRN
Status: DISCONTINUED | OUTPATIENT
Start: 2025-05-03 | End: 2025-05-05 | Stop reason: HOSPADM

## 2025-05-03 RX ORDER — ATORVASTATIN CALCIUM 40 MG/1
40 TABLET, FILM COATED ORAL DAILY
Status: DISCONTINUED | OUTPATIENT
Start: 2025-05-04 | End: 2025-05-05 | Stop reason: HOSPADM

## 2025-05-03 RX ORDER — DEXTROSE MONOHYDRATE 100 MG/ML
INJECTION, SOLUTION INTRAVENOUS CONTINUOUS PRN
Status: DISCONTINUED | OUTPATIENT
Start: 2025-05-03 | End: 2025-05-05 | Stop reason: HOSPADM

## 2025-05-03 RX ORDER — LISINOPRIL 10 MG/1
10 TABLET ORAL DAILY
Status: DISCONTINUED | OUTPATIENT
Start: 2025-05-04 | End: 2025-05-05 | Stop reason: HOSPADM

## 2025-05-03 RX ORDER — SODIUM CHLORIDE 9 MG/ML
INJECTION, SOLUTION INTRAVENOUS PRN
Status: DISCONTINUED | OUTPATIENT
Start: 2025-05-03 | End: 2025-05-05 | Stop reason: HOSPADM

## 2025-05-03 RX ORDER — ENOXAPARIN SODIUM 100 MG/ML
60 INJECTION SUBCUTANEOUS 2 TIMES DAILY
Status: DISCONTINUED | OUTPATIENT
Start: 2025-05-03 | End: 2025-05-05 | Stop reason: HOSPADM

## 2025-05-03 RX ORDER — POTASSIUM CHLORIDE 7.45 MG/ML
10 INJECTION INTRAVENOUS PRN
Status: DISCONTINUED | OUTPATIENT
Start: 2025-05-03 | End: 2025-05-05 | Stop reason: HOSPADM

## 2025-05-03 RX ORDER — ONDANSETRON 2 MG/ML
4 INJECTION INTRAMUSCULAR; INTRAVENOUS EVERY 6 HOURS PRN
Status: DISCONTINUED | OUTPATIENT
Start: 2025-05-03 | End: 2025-05-05 | Stop reason: HOSPADM

## 2025-05-03 RX ORDER — SODIUM CHLORIDE 0.9 % (FLUSH) 0.9 %
5-40 SYRINGE (ML) INJECTION EVERY 12 HOURS SCHEDULED
Status: DISCONTINUED | OUTPATIENT
Start: 2025-05-03 | End: 2025-05-05 | Stop reason: HOSPADM

## 2025-05-03 RX ORDER — POLYETHYLENE GLYCOL 3350 17 G/17G
17 POWDER, FOR SOLUTION ORAL DAILY PRN
Status: DISCONTINUED | OUTPATIENT
Start: 2025-05-03 | End: 2025-05-05 | Stop reason: HOSPADM

## 2025-05-03 RX ORDER — INSULIN GLARGINE 100 [IU]/ML
0.15 INJECTION, SOLUTION SUBCUTANEOUS DAILY
Status: DISCONTINUED | OUTPATIENT
Start: 2025-05-03 | End: 2025-05-05 | Stop reason: HOSPADM

## 2025-05-03 RX ORDER — MAGNESIUM SULFATE IN WATER 40 MG/ML
2000 INJECTION, SOLUTION INTRAVENOUS PRN
Status: DISCONTINUED | OUTPATIENT
Start: 2025-05-03 | End: 2025-05-05 | Stop reason: HOSPADM

## 2025-05-03 RX ORDER — ACETAMINOPHEN 650 MG/1
650 SUPPOSITORY RECTAL EVERY 6 HOURS PRN
Status: DISCONTINUED | OUTPATIENT
Start: 2025-05-03 | End: 2025-05-05 | Stop reason: HOSPADM

## 2025-05-03 RX ORDER — INSULIN LISPRO 100 [IU]/ML
0-4 INJECTION, SOLUTION INTRAVENOUS; SUBCUTANEOUS
Status: DISCONTINUED | OUTPATIENT
Start: 2025-05-03 | End: 2025-05-05 | Stop reason: HOSPADM

## 2025-05-03 RX ORDER — IPRATROPIUM BROMIDE AND ALBUTEROL SULFATE 2.5; .5 MG/3ML; MG/3ML
2 SOLUTION RESPIRATORY (INHALATION) ONCE
Status: COMPLETED | OUTPATIENT
Start: 2025-05-03 | End: 2025-05-03

## 2025-05-03 RX ADMIN — METHYLPREDNISOLONE SODIUM SUCCINATE 125 MG: 125 INJECTION INTRAMUSCULAR; INTRAVENOUS at 14:14

## 2025-05-03 RX ADMIN — MORPHINE SULFATE 4 MG: 4 INJECTION, SOLUTION INTRAMUSCULAR; INTRAVENOUS at 20:12

## 2025-05-03 RX ADMIN — Medication: at 17:57

## 2025-05-03 RX ADMIN — MORPHINE SULFATE 4 MG: 4 INJECTION, SOLUTION INTRAMUSCULAR; INTRAVENOUS at 15:15

## 2025-05-03 RX ADMIN — ENOXAPARIN SODIUM 60 MG: 100 INJECTION SUBCUTANEOUS at 20:11

## 2025-05-03 RX ADMIN — INSULIN GLARGINE 11 UNITS: 100 INJECTION, SOLUTION SUBCUTANEOUS at 21:36

## 2025-05-03 RX ADMIN — Medication 10 ML: at 20:11

## 2025-05-03 RX ADMIN — IPRATROPIUM BROMIDE AND ALBUTEROL SULFATE 2 DOSE: 2.5; .5 SOLUTION RESPIRATORY (INHALATION) at 14:14

## 2025-05-03 ASSESSMENT — PAIN DESCRIPTION - LOCATION
LOCATION: HIP;LEG
LOCATION: BACK
LOCATION: BACK

## 2025-05-03 ASSESSMENT — PAIN DESCRIPTION - FREQUENCY: FREQUENCY: CONTINUOUS

## 2025-05-03 ASSESSMENT — PAIN DESCRIPTION - ORIENTATION
ORIENTATION: LOWER
ORIENTATION: LOWER
ORIENTATION: LEFT

## 2025-05-03 ASSESSMENT — PAIN DESCRIPTION - ONSET: ONSET: ON-GOING

## 2025-05-03 ASSESSMENT — PAIN SCALES - GENERAL
PAINLEVEL_OUTOF10: 5
PAINLEVEL_OUTOF10: 6
PAINLEVEL_OUTOF10: 10
PAINLEVEL_OUTOF10: 2

## 2025-05-03 ASSESSMENT — PAIN DESCRIPTION - DESCRIPTORS
DESCRIPTORS: ACHING
DESCRIPTORS: ACHING

## 2025-05-03 ASSESSMENT — PAIN - FUNCTIONAL ASSESSMENT
PAIN_FUNCTIONAL_ASSESSMENT: 0-10
PAIN_FUNCTIONAL_ASSESSMENT: ACTIVITIES ARE NOT PREVENTED
PAIN_FUNCTIONAL_ASSESSMENT: ACTIVITIES ARE NOT PREVENTED

## 2025-05-03 ASSESSMENT — PAIN DESCRIPTION - PAIN TYPE: TYPE: CHRONIC PAIN

## 2025-05-03 NOTE — ED PROVIDER NOTES
[facility] EMERGENCY DEPARTMENT    EMERGENCY DEPARTMENT ENCOUNTER        Patient Name: Francois Louis  MRN: [id number]  Birthdate 1970  Date of evaluation: 5/3/2025  PCP: Ashia Davis PA-C  Note Started: 1:50 PM EDT 5/3/25    CHIEF COMPLAINT       Abdominal Pain and Back Pain (Pt and EMS report pt began having lumbar pain and abd pain generalized this am, denies trauma or injury. Was seen recently for seepage from umbilicus, will have hernia sx in next few weeks. Has new home O2 requirement of 2L NC. )      HISTORY OF PRESENT ILLNESS: 1 or more Elements       Francois Louis is a 55 y.o. male with history of COPD who was recently admitted to the hospital for umbilical hernia who presents to the emergency department for evaluation of abdominal pain.  Patient reports noticing blood coming out of his navel this morning.    Reports he has also been feeling short of breath.  Has a cough.  No fevers or chills at home.  Reports \"someone\" deliver oxygen to his house yesterday.  Has been trying the 2 L nasal cannula O2, but still feeling short of breath.  Reports yesterday was the first time he was on oxygen at home.    Denies having worsening lower extremity edema.  Denies having chest pain.  Reports he is also having some lower back pain.  Denies any injuries.  Reports he started having some back pain yesterday night while he was in the car driving.  He has history of back pain, but it is worse than normal.    No other complaints, modifying factors or associated symptoms.     History obtained by the patient unless stated otherwise as above on HPI.   No limitations unless specified as above on HPI.     Past medical history:   Past Medical History:   Diagnosis Date    Anxiety     Bilateral primary osteoarthritis of hip 6/30/2020    Bipolar 1 disorder, manic, mild (HCC) 11/8/2016    Chronic obstructive pulmonary disease (HCC) 6/7/2020    COPD exacerbation (HCC) 6/7/2020    Gastroesophageal reflux disease without

## 2025-05-03 NOTE — ED NOTES
Report given to Ti RN, who assumes care when pt arrives on ICU. Pt stable for transport to ICU, Ti RN to transport. Pt exited unit via stretcher under no duress.

## 2025-05-04 LAB
ANION GAP SERPL CALCULATED.3IONS-SCNC: 9 MMOL/L (ref 3–16)
BACTERIA BLD CULT ORG #2: NORMAL
BACTERIA BLD CULT: NORMAL
BASE EXCESS BLDV CALC-SCNC: 4.1 MMOL/L (ref -3–3)
BASOPHILS # BLD: 0.1 K/UL (ref 0–0.2)
BASOPHILS NFR BLD: 0.5 %
BUN SERPL-MCNC: 20 MG/DL (ref 7–20)
CALCIUM SERPL-MCNC: 9.1 MG/DL (ref 8.3–10.6)
CHLORIDE SERPL-SCNC: 103 MMOL/L (ref 99–110)
CO2 BLDV-SCNC: 32 MMOL/L
CO2 SERPL-SCNC: 27 MMOL/L (ref 21–32)
COHGB MFR BLDV: 1.4 % (ref 0–1.5)
CREAT SERPL-MCNC: 0.7 MG/DL (ref 0.9–1.3)
DEPRECATED RDW RBC AUTO: 15.3 % (ref 12.4–15.4)
EKG ATRIAL RATE: 93 BPM
EKG DIAGNOSIS: NORMAL
EKG P AXIS: 69 DEGREES
EKG P-R INTERVAL: 186 MS
EKG Q-T INTERVAL: 354 MS
EKG QRS DURATION: 72 MS
EKG QTC CALCULATION (BAZETT): 440 MS
EKG R AXIS: 62 DEGREES
EKG T AXIS: 60 DEGREES
EKG VENTRICULAR RATE: 93 BPM
EOSINOPHIL # BLD: 0 K/UL (ref 0–0.6)
EOSINOPHIL NFR BLD: 0.2 %
GFR SERPLBLD CREATININE-BSD FMLA CKD-EPI: >90 ML/MIN/{1.73_M2}
GLUCOSE BLD-MCNC: 104 MG/DL (ref 70–99)
GLUCOSE BLD-MCNC: 110 MG/DL (ref 70–99)
GLUCOSE BLD-MCNC: 124 MG/DL (ref 70–99)
GLUCOSE BLD-MCNC: 94 MG/DL (ref 70–99)
GLUCOSE SERPL-MCNC: 113 MG/DL (ref 70–99)
HCO3 BLDV-SCNC: 30 MMOL/L (ref 23–29)
HCT VFR BLD AUTO: 44.6 % (ref 40.5–52.5)
HGB BLD-MCNC: 14.4 G/DL (ref 13.5–17.5)
LYMPHOCYTES # BLD: 1.7 K/UL (ref 1–5.1)
LYMPHOCYTES NFR BLD: 14.2 %
MCH RBC QN AUTO: 29.3 PG (ref 26–34)
MCHC RBC AUTO-ENTMCNC: 32.4 G/DL (ref 31–36)
MCV RBC AUTO: 90.4 FL (ref 80–100)
METHGB MFR BLDV: 0.3 %
MONOCYTES # BLD: 1.2 K/UL (ref 0–1.3)
MONOCYTES NFR BLD: 9.9 %
NEUTROPHILS # BLD: 9 K/UL (ref 1.7–7.7)
NEUTROPHILS NFR BLD: 75.2 %
O2 THERAPY: ABNORMAL
PCO2 BLDV: 49.5 MMHG (ref 40–50)
PERFORMED ON: ABNORMAL
PERFORMED ON: NORMAL
PH BLDV: 7.4 [PH] (ref 7.35–7.45)
PLATELET # BLD AUTO: 220 K/UL (ref 135–450)
PMV BLD AUTO: 8.4 FL (ref 5–10.5)
PO2 BLDV: 79.2 MMHG (ref 25–40)
POTASSIUM SERPL-SCNC: 4.2 MMOL/L (ref 3.5–5.1)
RBC # BLD AUTO: 4.94 M/UL (ref 4.2–5.9)
SAO2 % BLDV: 96 %
SODIUM SERPL-SCNC: 139 MMOL/L (ref 136–145)
WBC # BLD AUTO: 12 K/UL (ref 4–11)

## 2025-05-04 PROCEDURE — 85025 COMPLETE CBC W/AUTO DIFF WBC: CPT

## 2025-05-04 PROCEDURE — 6360000002 HC RX W HCPCS: Performed by: INTERNAL MEDICINE

## 2025-05-04 PROCEDURE — 94660 CPAP INITIATION&MGMT: CPT

## 2025-05-04 PROCEDURE — 99255 IP/OBS CONSLTJ NEW/EST HI 80: CPT | Performed by: INTERNAL MEDICINE

## 2025-05-04 PROCEDURE — 83036 HEMOGLOBIN GLYCOSYLATED A1C: CPT

## 2025-05-04 PROCEDURE — 6370000000 HC RX 637 (ALT 250 FOR IP): Performed by: HOSPITALIST

## 2025-05-04 PROCEDURE — 99223 1ST HOSP IP/OBS HIGH 75: CPT | Performed by: INTERNAL MEDICINE

## 2025-05-04 PROCEDURE — 94761 N-INVAS EAR/PLS OXIMETRY MLT: CPT

## 2025-05-04 PROCEDURE — 2000000000 HC ICU R&B

## 2025-05-04 PROCEDURE — 36415 COLL VENOUS BLD VENIPUNCTURE: CPT

## 2025-05-04 PROCEDURE — 80048 BASIC METABOLIC PNL TOTAL CA: CPT

## 2025-05-04 PROCEDURE — 6370000000 HC RX 637 (ALT 250 FOR IP): Performed by: NURSE PRACTITIONER

## 2025-05-04 PROCEDURE — 2700000000 HC OXYGEN THERAPY PER DAY

## 2025-05-04 PROCEDURE — 6360000002 HC RX W HCPCS: Performed by: NURSE PRACTITIONER

## 2025-05-04 PROCEDURE — 82803 BLOOD GASES ANY COMBINATION: CPT

## 2025-05-04 PROCEDURE — 2500000003 HC RX 250 WO HCPCS: Performed by: NURSE PRACTITIONER

## 2025-05-04 RX ORDER — FUROSEMIDE 10 MG/ML
20 INJECTION INTRAMUSCULAR; INTRAVENOUS ONCE
Status: COMPLETED | OUTPATIENT
Start: 2025-05-04 | End: 2025-05-04

## 2025-05-04 RX ADMIN — INSULIN LISPRO 4 UNITS: 100 INJECTION, SOLUTION INTRAVENOUS; SUBCUTANEOUS at 17:00

## 2025-05-04 RX ADMIN — FUROSEMIDE 20 MG: 10 INJECTION, SOLUTION INTRAMUSCULAR; INTRAVENOUS at 10:43

## 2025-05-04 RX ADMIN — Medication 10 ML: at 20:48

## 2025-05-04 RX ADMIN — ENOXAPARIN SODIUM 60 MG: 100 INJECTION SUBCUTANEOUS at 08:32

## 2025-05-04 RX ADMIN — Medication 10 ML: at 08:43

## 2025-05-04 RX ADMIN — INSULIN LISPRO 4 UNITS: 100 INJECTION, SOLUTION INTRAVENOUS; SUBCUTANEOUS at 12:04

## 2025-05-04 RX ADMIN — ENOXAPARIN SODIUM 60 MG: 100 INJECTION SUBCUTANEOUS at 20:47

## 2025-05-04 RX ADMIN — INSULIN GLARGINE 11 UNITS: 100 INJECTION, SOLUTION SUBCUTANEOUS at 08:33

## 2025-05-04 RX ADMIN — INSULIN LISPRO 4 UNITS: 100 INJECTION, SOLUTION INTRAVENOUS; SUBCUTANEOUS at 07:33

## 2025-05-04 RX ADMIN — ACETAMINOPHEN 650 MG: 325 TABLET ORAL at 13:31

## 2025-05-04 RX ADMIN — ATORVASTATIN CALCIUM 40 MG: 40 TABLET, FILM COATED ORAL at 08:32

## 2025-05-04 RX ADMIN — LISINOPRIL 10 MG: 10 TABLET ORAL at 08:32

## 2025-05-04 ASSESSMENT — PAIN DESCRIPTION - LOCATION: LOCATION: HIP

## 2025-05-04 ASSESSMENT — PAIN DESCRIPTION - ORIENTATION: ORIENTATION: RIGHT;LEFT

## 2025-05-04 ASSESSMENT — PAIN SCALES - GENERAL
PAINLEVEL_OUTOF10: 0
PAINLEVEL_OUTOF10: 5

## 2025-05-04 ASSESSMENT — PAIN DESCRIPTION - DESCRIPTORS: DESCRIPTORS: ACHING

## 2025-05-04 ASSESSMENT — PAIN - FUNCTIONAL ASSESSMENT: PAIN_FUNCTIONAL_ASSESSMENT: PREVENTS OR INTERFERES SOME ACTIVE ACTIVITIES AND ADLS

## 2025-05-04 NOTE — PLAN OF CARE
Problem: Chronic Conditions and Co-morbidities  Goal: Patient's chronic conditions and co-morbidity symptoms are monitored and maintained or improved  Outcome: Progressing  Flowsheets (Taken 5/3/2025 2000)  Care Plan - Patient's Chronic Conditions and Co-Morbidity Symptoms are Monitored and Maintained or Improved: Monitor and assess patient's chronic conditions and comorbid symptoms for stability, deterioration, or improvement     Problem: Discharge Planning  Goal: Discharge to home or other facility with appropriate resources  Outcome: Progressing  Flowsheets  Taken 5/3/2025 2000 by Des Fonseca, RN  Discharge to home or other facility with appropriate resources:   Identify barriers to discharge with patient and caregiver   Arrange for needed discharge resources and transportation as appropriate  Taken 5/3/2025 1742 by Ti Gaitan, RN  Discharge to home or other facility with appropriate resources:   Identify discharge learning needs (meds, wound care, etc)   Identify barriers to discharge with patient and caregiver     Problem: Pain  Goal: Verbalizes/displays adequate comfort level or baseline comfort level  Outcome: Progressing     Problem: Safety - Adult  Goal: Free from fall injury  Outcome: Progressing     Problem: ABCDS Injury Assessment  Goal: Absence of physical injury  Outcome: Progressing  Flowsheets (Taken 5/3/2025 1740 by Ti Gaitan, RN)  Absence of Physical Injury: Implement safety measures based on patient assessment

## 2025-05-04 NOTE — CONSULTS
Reason for referral and CC: Bleeding from umbilicus plan admit for hypercarbia    HISTORY OF PRESENT ILLNESS: 56 yo male presented with a c/o some blood from umbilicus where he has a hernia. No bleeding by the time of arrival to ED.     Has acute on chronic elevated CO2 - appears chronic. He also has LE edema which he says waxes and wanes.  Chronic LEON but no acute SOB. Chronic cough that is better now than a few weeks ago.  The pt is not able to sleep flat and sleeps in his car so he can be sitting up.    Same presentation 3 days ago. Given home O2 and referred to Sleep clinic.   Past Medical History:   Diagnosis Date    Anxiety     Bilateral primary osteoarthritis of hip 06/30/2020    Bipolar 1 disorder, manic, mild (HCC) 11/08/2016    Chronic obstructive pulmonary disease (HCC) 06/07/2020    COPD exacerbation (HCC) 06/07/2020    Gastroesophageal reflux disease without esophagitis 06/03/2016    HTN (hypertension) 08/07/2015    Hyperlipidemia     IBS (irritable bowel syndrome) 03/21/2013    Low back pain 06/30/2020    MDD (major depressive disorder) 10/28/2013    Migraine syndrome 03/21/2013    Morbid obesity (HCC)     Pneumonia     Tobacco abuse     Type 2 diabetes mellitus (HCC)      Past Surgical History:   Procedure Laterality Date    CHOLECYSTECTOMY       Family History  family history includes Dementia in his mother; Heart Disease in his father.    Social History:  reports that he quit smoking about 11 years ago. His smoking use included cigarettes. He started smoking about 31 years ago. He has a 20 pack-year smoking history. He has never used smokeless tobacco.   reports no history of alcohol use.    ALLERGIES:  Patient is allergic to epinephrine.  Continuous Infusions:   sodium chloride      dextrose       Scheduled Meds:   atorvastatin  40 mg Oral Daily    lisinopril  10 mg Oral Daily    sodium chloride flush  5-40 mL IntraVENous 2 times per day    enoxaparin  60 mg SubCUTAneous BID    insulin glargine

## 2025-05-04 NOTE — CARE COORDINATION
Case Management Assessment  Initial Evaluation    Date/Time of Evaluation: 5/4/2025 8:37 AM  Assessment Completed by: Rowan Castanon RN    If patient is discharged prior to next notation, then this note serves as note for discharge by case management.    Patient Name: Francois Louis                   YOB: 1970  Diagnosis: Acute respiratory failure (HCC) [J96.00]  Acute on chronic respiratory failure with hypoxia and hypercapnia (HCC) [J96.21, J96.22]                   Date / Time: 5/3/2025 12:54 PM    Patient Admission Status: Inpatient   Readmission Risk (Low < 19, Mod (19-27), High > 27): Readmission Risk Score: 14.9    Current PCP: Ashia Davis PA-C  PCP verified by CM? Yes    Chart Reviewed: Yes      History Provided by: Patient  Patient Orientation: Alert and Oriented    Patient Cognition: Alert    Hospitalization in the last 30 days (Readmission):  Yes    If yes, Readmission Assessment in CM Navigator will be completed.    Advance Directives:      Code Status: Full Code   Patient's Primary Decision Maker is: Legal Next of Kin      Discharge Planning:    Patient lives with: Friends Type of Home: Apartment  Primary Care Giver: Self  Patient Support Systems include: Family Members   Current Financial resources: Medicaid  Current community resources: None  Current services prior to admission: Oxygen Therapy ([facility])            Current DME: Other (Comment) (rollator)            Type of Home Care services:  None    ADLS  Prior functional level: Independent in ADLs/IADLs  Current functional level: Independent in ADLs/IADLs    PT AM-PAC:   /24  OT AM-PAC:   /24    Family can provide assistance at DC: Yes  Would you like Case Management to discuss the discharge plan with any other family members/significant others, and if so, who? No  Plans to Return to Present Housing: Yes  Other Identified Issues/Barriers to RETURNING to current housing: na  Potential Assistance needed at discharge: N/A

## 2025-05-04 NOTE — H&P
11:35  ANTIBIOTICS AT LEAH.:                      RECEIVED :  04/30/25 11:46  If child <=2 yrs old please draw pediatric bottle.~Blood Culture #2           Latest Reference Range & Units 05/03/25 20:01 05/04/25 04:24   Carboxyhemoglobin 0.0 - 1.5 % 1.2 1.4   O2 Therapy  Unknown Unknown   CARBON DIOXIDE 21 - 32 mmol/L  27   pH, [place] 7.350 - 7.450  7.379 7.400   pCO2, [place] 40.0 - 50.0 mmHg 51.3 (H) 49.5   pO2, [place] 25.0 - 40.0 mmHg 72.7 (H) 79.2 (H)   Bicarbonate, Venous 23.0 - 29.0 mmol/L 29.6 (H) 30.0 (H)   TC02 (Calc), [place] Not Established mmol/L 31 32   Base Excess, [place] -3.0 - 3.0 mmol/L 3.2 (H) 4.1 (H)   O2 Content, [place] Not Established VOL % 21    MetHgb, [place] <1.5 % 0.2 0.3   O2 Saturation Venous Not Established % 94 96   (H): Data is abnormally high    CT ABDOMEN PELVIS WO CONTRAST Additional Contrast? None   Final Result   No acute abnormality         XR CHEST PORTABLE   Final Result   No radiographic evidence of an acute cardiopulmonary process.             ASSESSMENT:    Principal Problem:    Acute respiratory failure (HCC)  Resolved Problems:    * No resolved hospital problems. *      PLAN:    # Hypoxia  # Suspected obesity hypoventilation syndrome and sleep apnea  - Came to the ER not feeling well.  - Venous blood gas shows CO2 retention.  - Patient has obesity hypoventilation syndrome.  He needs an urgent sleep study and BiPAP as an outpatient.  Discussed with pulmonology.    # Lower extremity edema.    - He was started on Lasix.  - Recent echo showed normal LV function.  He may have diastolic dysfunction.    # Morbid obesity  - Body mass index is 60.92 kg/m².  - Complicating assessment and treatment. Placing patient at risk for multiple co-morbidities as well as early death and contributing to the patient's presentation.   - Weight loss would be beneficial      # Recent bleeding from umbilical hernia has resolved    # Primary hypertension  - Blood pressure well-controlled.  Continue lisinopril 10 mg a

## 2025-05-04 NOTE — ACP (ADVANCE CARE PLANNING)
Advance Care Planning     General Advance Care Planning (ACP) Conversation    Date of Conversation: 5/4/2025  Conducted with: Patient with Decision Making Capacity  Other persons present: None    Healthcare Decision Maker: No healthcare decision makers have been documented.       Content/Action Overview:  DECLINED ACP Conversation - will revisit periodically  Reviewed DNR/DNI and patient elects Full Code (Attempt Resuscitation)        Length of Voluntary ACP Conversation in minutes:  <16 minutes (Non-Billable)    Rowan Castanon RN

## 2025-05-05 VITALS
WEIGHT: 315 LBS | TEMPERATURE: 97.9 F | OXYGEN SATURATION: 92 % | HEART RATE: 93 BPM | RESPIRATION RATE: 19 BRPM | BODY MASS INDEX: 46.65 KG/M2 | SYSTOLIC BLOOD PRESSURE: 155 MMHG | HEIGHT: 69 IN | DIASTOLIC BLOOD PRESSURE: 136 MMHG

## 2025-05-05 PROBLEM — R06.81 APNEA: Status: ACTIVE | Noted: 2025-05-05

## 2025-05-05 PROBLEM — J96.02 ACUTE HYPERCAPNIC RESPIRATORY FAILURE (HCC): Status: ACTIVE | Noted: 2025-05-05

## 2025-05-05 PROBLEM — G47.33 OSA (OBSTRUCTIVE SLEEP APNEA): Status: ACTIVE | Noted: 2025-05-05

## 2025-05-05 LAB
ANION GAP SERPL CALCULATED.3IONS-SCNC: 10 MMOL/L (ref 3–16)
BASOPHILS # BLD: 0 K/UL (ref 0–0.2)
BASOPHILS NFR BLD: 0.6 %
BUN SERPL-MCNC: 22 MG/DL (ref 7–20)
CALCIUM SERPL-MCNC: 8.6 MG/DL (ref 8.3–10.6)
CHLORIDE SERPL-SCNC: 103 MMOL/L (ref 99–110)
CO2 SERPL-SCNC: 27 MMOL/L (ref 21–32)
CREAT SERPL-MCNC: 0.7 MG/DL (ref 0.9–1.3)
DEPRECATED RDW RBC AUTO: 15.3 % (ref 12.4–15.4)
EOSINOPHIL # BLD: 0.1 K/UL (ref 0–0.6)
EOSINOPHIL NFR BLD: 1.3 %
EST. AVERAGE GLUCOSE BLD GHB EST-MCNC: 145.6 MG/DL
GFR SERPLBLD CREATININE-BSD FMLA CKD-EPI: >90 ML/MIN/{1.73_M2}
GLUCOSE BLD-MCNC: 133 MG/DL (ref 70–99)
GLUCOSE BLD-MCNC: 84 MG/DL (ref 70–99)
GLUCOSE BLD-MCNC: 88 MG/DL (ref 70–99)
GLUCOSE SERPL-MCNC: 93 MG/DL (ref 70–99)
HBA1C MFR BLD: 6.7 %
HCT VFR BLD AUTO: 44.4 % (ref 40.5–52.5)
HGB BLD-MCNC: 14.6 G/DL (ref 13.5–17.5)
LYMPHOCYTES # BLD: 2.1 K/UL (ref 1–5.1)
LYMPHOCYTES NFR BLD: 25.3 %
MCH RBC QN AUTO: 30.2 PG (ref 26–34)
MCHC RBC AUTO-ENTMCNC: 32.8 G/DL (ref 31–36)
MCV RBC AUTO: 92.2 FL (ref 80–100)
MONOCYTES # BLD: 0.9 K/UL (ref 0–1.3)
MONOCYTES NFR BLD: 10.4 %
NEUTROPHILS # BLD: 5.2 K/UL (ref 1.7–7.7)
NEUTROPHILS NFR BLD: 62.4 %
PERFORMED ON: ABNORMAL
PERFORMED ON: NORMAL
PERFORMED ON: NORMAL
PLATELET # BLD AUTO: 204 K/UL (ref 135–450)
PMV BLD AUTO: 8.3 FL (ref 5–10.5)
POTASSIUM SERPL-SCNC: 3.9 MMOL/L (ref 3.5–5.1)
RBC # BLD AUTO: 4.82 M/UL (ref 4.2–5.9)
SODIUM SERPL-SCNC: 140 MMOL/L (ref 136–145)
WBC # BLD AUTO: 8.3 K/UL (ref 4–11)

## 2025-05-05 PROCEDURE — 6370000000 HC RX 637 (ALT 250 FOR IP): Performed by: HOSPITALIST

## 2025-05-05 PROCEDURE — 94660 CPAP INITIATION&MGMT: CPT

## 2025-05-05 PROCEDURE — 94761 N-INVAS EAR/PLS OXIMETRY MLT: CPT

## 2025-05-05 PROCEDURE — 99233 SBSQ HOSP IP/OBS HIGH 50: CPT | Performed by: INTERNAL MEDICINE

## 2025-05-05 PROCEDURE — 2700000000 HC OXYGEN THERAPY PER DAY

## 2025-05-05 PROCEDURE — 99239 HOSP IP/OBS DSCHRG MGMT >30: CPT | Performed by: INTERNAL MEDICINE

## 2025-05-05 PROCEDURE — 6360000002 HC RX W HCPCS: Performed by: INTERNAL MEDICINE

## 2025-05-05 PROCEDURE — 6370000000 HC RX 637 (ALT 250 FOR IP): Performed by: NURSE PRACTITIONER

## 2025-05-05 PROCEDURE — 6360000002 HC RX W HCPCS: Performed by: NURSE PRACTITIONER

## 2025-05-05 PROCEDURE — 85025 COMPLETE CBC W/AUTO DIFF WBC: CPT

## 2025-05-05 PROCEDURE — 2500000003 HC RX 250 WO HCPCS: Performed by: NURSE PRACTITIONER

## 2025-05-05 PROCEDURE — 80048 BASIC METABOLIC PNL TOTAL CA: CPT

## 2025-05-05 PROCEDURE — 36415 COLL VENOUS BLD VENIPUNCTURE: CPT

## 2025-05-05 RX ORDER — MUPIROCIN 20 MG/G
OINTMENT TOPICAL 2 TIMES DAILY
Status: DISCONTINUED | OUTPATIENT
Start: 2025-05-05 | End: 2025-05-05 | Stop reason: HOSPADM

## 2025-05-05 RX ORDER — FUROSEMIDE 10 MG/ML
20 INJECTION INTRAMUSCULAR; INTRAVENOUS EVERY 12 HOURS
Status: DISCONTINUED | OUTPATIENT
Start: 2025-05-05 | End: 2025-05-05 | Stop reason: HOSPADM

## 2025-05-05 RX ADMIN — INSULIN GLARGINE 11 UNITS: 100 INJECTION, SOLUTION SUBCUTANEOUS at 09:33

## 2025-05-05 RX ADMIN — FUROSEMIDE 20 MG: 10 INJECTION, SOLUTION INTRAMUSCULAR; INTRAVENOUS at 12:19

## 2025-05-05 RX ADMIN — INSULIN LISPRO 4 UNITS: 100 INJECTION, SOLUTION INTRAVENOUS; SUBCUTANEOUS at 12:18

## 2025-05-05 RX ADMIN — ATORVASTATIN CALCIUM 40 MG: 40 TABLET, FILM COATED ORAL at 09:33

## 2025-05-05 RX ADMIN — Medication 10 ML: at 09:34

## 2025-05-05 RX ADMIN — LISINOPRIL 10 MG: 10 TABLET ORAL at 09:33

## 2025-05-05 RX ADMIN — ENOXAPARIN SODIUM 60 MG: 100 INJECTION SUBCUTANEOUS at 09:33

## 2025-05-05 ASSESSMENT — PAIN SCALES - GENERAL
PAINLEVEL_OUTOF10: 0
PAINLEVEL_OUTOF10: 0

## 2025-05-05 NOTE — PLAN OF CARE
Problem: Chronic Conditions and Co-morbidities  Goal: Patient's chronic conditions and co-morbidity symptoms are monitored and maintained or improved  5/4/2025 2131 by Francheska Arciniega RN  Outcome: Progressing     Problem: Pain  Goal: Verbalizes/displays adequate comfort level or baseline comfort level  5/4/2025 2131 by Francheska Arciniega RN  Outcome: Progressing     Problem: Pain  Goal: Verbalizes/displays adequate comfort level or baseline comfort level  5/4/2025 2131 by Francheska Arciniega RN  Outcome: Progressing     Problem: Safety - Adult  Goal: Free from fall injury  5/4/2025 2131 by Francheska Arciniega RN  Outcome: Progressing     Problem: ABCDS Injury Assessment  Goal: Absence of physical injury  5/4/2025 2131 by Francheska Arciniega RN  Outcome: Progressing

## 2025-05-05 NOTE — FLOWSHEET NOTE
05/05/25 0700   Vitals   Temp 97.4 °F (36.3 °C)   Temp Source Temporal   Pulse 74   Heart Rate Source Monitor   Respirations 13   /85   MAP (Calculated) 101   MAP (mmHg) 95   BP Method Automatic   Oxygen Therapy   SpO2 98 %     Vital signs stable. Pt is alert and oriented X4 and denies any worsening SOB or pain at the moment. Nothing new noted on head to toe assessment. Pt continues to saturate well on 3L NC. Dressing to umbilical hernia remains secure in place with no drainage noted.  Pt is NSR on the monitor. Morning medication administration completed. Pt denies any further assistance at the moment. Will continue to monitor.

## 2025-05-05 NOTE — PROGRESS NOTES
05/03/25 2038   NIV Type   Equipment Type v60   Mode Bilevel   Mask Type Full face mask   Mask Size Medium   Assessment   Pulse 85   Respirations 17   SpO2 97 %   Settings/Measurements   IPAP 14 cmH20   CPAP/EPAP 7 cmH2O   Vt (Measured) 868 mL   Rate Ordered 16   FiO2  40 %   Minute Volume (L/min) 14.6 Liters   Mask Leak (lpm) 1 lpm   Patient's Home Machine No   Patient Observation   Patient Observations spo2 97% on 40% bipap       
   05/03/25 2334   NIV Type   Equipment Type v60   Mode Bilevel   Mask Type Full face mask   Mask Size Medium   Assessment   Pulse 94   Respirations 15   Settings/Measurements   IPAP 14 cmH20   CPAP/EPAP 7 cmH2O   Vt (Measured) 776 mL   Rate Ordered 16   FiO2  40 %   Minute Volume (L/min) 16.6 Liters   Mask Leak (lpm) 4 lpm   Patient's Home Machine No   Patient Observation   Patient Observations spo2 95% on 40% bipap       
   05/04/25 0304   NIV Type   Equipment Type v60   Mode Bilevel   Mask Type Full face mask   Mask Size Medium   Settings/Measurements   IPAP 14 cmH20   CPAP/EPAP 7 cmH2O   Vt (Measured) 839 mL   Rate Ordered 16   FiO2  40 %   Minute Volume (L/min) 12.6 Liters   Mask Leak (lpm) 9 lpm   Patient's Home Machine No   Patient Observation   Patient Observations spo2 96% on 40% bipap       
   05/04/25 2329   NIV Type   NIV Started/Stopped On   Equipment Type v60   Mode Bilevel   Mask Type Full face mask   Mask Size Medium   Assessment   Pulse 90   Respirations 20   SpO2 95 %   Comfort Level Good   Using Accessory Muscles No   Mask Compliance Good   Skin Assessment Clean, dry, & intact   Skin Protection for O2 Device Yes   Orientation Middle   Location Nose   Intervention(s) Skin Barrier   Breath Sounds   Respiratory Pattern Regular   Right Upper Lobe Diminished   Right Middle Lobe Diminished   Right Lower Lobe Diminished   Left Upper Lobe Diminished   Left Lower Lobe Diminished   Settings/Measurements   PIP Observed 15 cm H20   IPAP 14 cmH20   CPAP/EPAP 7 cmH2O   Vt (Measured) 922 mL   Rate Ordered 16   Insp Rise Time (%) 2 %   FiO2  40 %   I Time/ I Time % 0.95 s   Minute Volume (L/min) 17.4 Liters   Mask Leak (lpm) 7 lpm   Patient's Home Machine No   Alarm Settings   Alarms On Y   Low Pressure (cmH2O) 5 cmH2O   High Pressure (cmH2O) 35 cmH2O   Apnea (secs) 20 secs   RR Low (bpm) 16   RR High (bpm) 45 br/min       
   05/05/25 0335   NIV Type   NIV Started/Stopped On   Equipment Type v60   Mode Bilevel   Mask Type Full face mask   Mask Size Medium   Assessment   Pulse 94   Respirations 15   SpO2 100 %   Comfort Level Good   Using Accessory Muscles No   Mask Compliance Good   Skin Assessment Clean, dry, & intact   Skin Protection for O2 Device Yes   Orientation Middle   Location Nose   Intervention(s) Skin Barrier   Breath Sounds   Respiratory Pattern Regular   Right Upper Lobe Diminished   Right Middle Lobe Diminished   Right Lower Lobe Diminished   Left Upper Lobe Diminished   Left Lower Lobe Diminished   Settings/Measurements   PIP Observed 15 cm H20   IPAP 14 cmH20   CPAP/EPAP 7 cmH2O   Vt (Measured) 737 mL   Rate Ordered 16   Insp Rise Time (%) 2 %   FiO2  40 %   I Time/ I Time % 0.95 s   Minute Volume (L/min) 16.8 Liters   Mask Leak (lpm) 32 lpm   Patient's Home Machine No   Alarm Settings   Alarms On Y   Low Pressure (cmH2O) 5 cmH2O   High Pressure (cmH2O) 35 cmH2O   Apnea (secs) 20 secs   RR Low (bpm) 16   RR High (bpm) 45 br/min   Oxygen Therapy/Pulse Ox   O2 Therapy Oxygen   O2 Device PAP (positive airway pressure)       
4 Eyes Skin Assessment     NAME:  Francois Louis  YOB: 1970  MEDICAL RECORD NUMBER:  [id number]    The patient is being assessed for  Shift Handoff    I agree that at least one RN has performed a thorough Head to Toe Skin Assessment on the patient. ALL assessment sites listed below have been assessed.      Areas assessed by both nurses:    Head, Face, Ears, Shoulders, Back, Chest, Arms, Elbows, Hands, Sacrum. Buttock, Coccyx, Ischium, Legs. Feet and Heels, and Under Medical Devices         Does the Patient have a Wound? Yes wound(s) were present on assessment. LDA wound assessment was Initiated and completed by RN    Denuded area right abdomen, dressing over umbilical hernia.        Tom Prevention initiated by RN: No  Wound Care Orders initiated by RN: No    Pressure Injury (Stage 3,4, Unstageable, DTI, NWPT, and Complex wounds) if present, place Wound referral order by RN under : No    New Ostomies, if present place, Ostomy referral order under : No     Nurse 1 eSignature: Electronically signed by Des Fonseca RN on 5/4/25 at 4:43 AM EDT    **SHARE this note so that the co-signing nurse can place an eSignature**    Nurse 2 eSignature: Electronically signed by Ti Gaitan RN on 5/4/25 at 11:52 AM EDT    
4 Eyes Skin Assessment     NAME:  Francois Louis  YOB: 1970  MEDICAL RECORD NUMBER:  [id number]    The patient is being assessed for  Shift Handoff    I agree that at least one RN has performed a thorough Head to Toe Skin Assessment on the patient. ALL assessment sites listed below have been assessed.      Areas assessed by both nurses:    Head, Face, Ears, Shoulders, Back, Chest, Arms, Elbows, Hands, Sacrum. Buttock, Coccyx, Ischium, Legs. Feet and Heels, and Under Medical Devices         Does the Patient have a Wound? Yes wound(s) were present on assessment. LDA wound assessment was Initiated and completed by RN    See media for known wounds       Tom Prevention initiated by RN: No  Wound Care Orders initiated by RN: Yes    Pressure Injury (Stage 3,4, Unstageable, DTI, NWPT, and Complex wounds) if present, place Wound referral order by RN under : No    New Ostomies, if present place, Ostomy referral order under : No     Nurse 1 eSignature: Electronically signed by Ti Gaitan RN on 5/4/25 at 11:15 AM EDT    **SHARE this note so that the co-signing nurse can place an eSignature**    Nurse 2 eSignature: {Esignature:[id number]}    
4 Eyes Skin Assessment     NAME:  Francois Louis  YOB: 1970  MEDICAL RECORD NUMBER:  [id number]    The patient is being assessed for  Admission    I agree that at least one RN has performed a thorough Head to Toe Skin Assessment on the patient. ALL assessment sites listed below have been assessed.      Areas assessed by both nurses:    Head, Face, Ears, Shoulders, Back, Chest, Arms, Elbows, Hands, Sacrum. Buttock, Coccyx, Ischium, Legs. Feet and Heels, and Under Medical Devices         Does the Patient have a Wound? Yes wound(s) were present on assessment. LDA wound assessment was Initiated and completed by RN    Known wound to umbilical hernia, Small denuded area to the RLQ, just adjacent to the umbilicus.      Added PSO, dry gauze, and tape to both          Tom Prevention initiated by RN: No  Wound Care Orders initiated by RN: Yes    Pressure Injury (Stage 3,4, Unstageable, DTI, NWPT, and Complex wounds) if present, place Wound referral order by RN under : Yes    New Ostomies, if present place, Ostomy referral order under : No     Nurse 1 eSignature: Electronically signed by Ti Gaitan RN on 5/3/25 at 6:15 PM EDT    **SHARE this note so that the co-signing nurse can place an eSignature**    Nurse 2 eSignature: Electronically signed by Yolande Borja RN on 5/3/25 at 6:44 PM EDT    
ICU rounds complete with dr verma    Plan to discharge tomorrow either with BiPap equipment or tomorrow evening to Sleep study.  Ok to be downgraded to 2west.  Give one dose of IV lasix d/t some edema and poor Urine output.   Notified Dr Walker regarding discharge dispo and ok to be downgraded  
[facility]   HEART FAILURE PROGRAM      NAME:  Francois Louis  AGE: 55 y.o.   GENDER: male  : 1970  TODAY'S DATE:  2025    Subjective:     VISIT TYPE: Education    ADMIT DATE: 5/3/2025    PAST MEDICAL HISTORY:      Diagnosis Date    Anxiety     Bilateral primary osteoarthritis of hip 2020    Bipolar 1 disorder, manic, mild (HCC) 2016    Chronic obstructive pulmonary disease (HCC) 2020    COPD exacerbation ([facility]) 2020    Gastroesophageal reflux disease without esophagitis 2016    HTN (hypertension) 2015    Hyperlipidemia     IBS (irritable bowel syndrome) 2013    Low back pain 2020    MDD (major depressive disorder) 10/28/2013    Migraine syndrome 2013    Morbid obesity ([facility])     JOLANTA (obstructive sleep apnea) 2025    Pneumonia     Tobacco abuse     Type 2 diabetes mellitus ([facility])      HOME MEDICATIONS:  Prior to Admission medications    Medication Sig Start Date End Date Taking? Authorizing Provider   bacitracin-polymyxin b (POLYSPORIN) [id number] UNIT/GM ointment Apply topically 2 times daily. 25 Yes Gertrudis Castillo MD   atorvastatin (LIPITOR) 40 MG tablet Take 1 tablet by mouth daily 25  Yes ProviderFadia MD   diclofenac sodium (VOLTAREN) 1 % GEL Apply 4 g topically 4 times daily 3/30/25  Yes Angel Moyer MD   lisinopril (PRINIVIL;ZESTRIL) 10 MG tablet Take 1 tablet by mouth daily 24  Yes ProviderFadia MD   metFORMIN (GLUCOPHAGE-XR) 500 MG extended release tablet Take by mouth 24  Yes Provider, MD Fadia   acetaminophen (TYLENOL) 500 MG tablet Take 1 tablet by mouth every 4 hours as needed for Pain or Fever 24  Yes Royal Jaramillo MD      Objective:     ADMISSION DIAGNOSIS:   Acute respiratory failure (HCC) [J96.00]  Acute on chronic respiratory failure with hypoxia and hypercapnia (HCC) [J96.21, J96.22]    WEIGHTS:    Admission weight: (!) 176.9 kg (390 lb)   Wt 
Messaged on call provider Dr Alaniz stating that patient is ok to transfer to [facility] per dr verma.       
Patient admitted to ICU 11 for COPD exacerbation (BiPap) from ED 4.   Report received from Vane DAVIS, ED.  Monitor leads applied. Rhythm: NSR , Rate: 76bpm  Oxygen requirements: BiPap, 14/7. SpO2: 98      Dr. Bush aware of admission.  
Patient denies any discomfort or pain at this time. Patient still wearing the BIPAP. Afebrile. Voided X1.  
Patient on BiPAP, VBG improved, asking about diet, patient diabetic.    Tolerating BiPAP at this time, lung sounds diminished, FiO2 40%, has home oxygen wears 2 liters but just started using.  Recently discharged 5/1.      Bowel sounds active, BM today, normal per patient, denies any problems voiding, urinal at bedside.      NSR on monitor, slight hypertensive better than start of shift, took lisinopril today.  Edema +2 pitting to BLE.      Has umbilical hernia and abrasion to right abdomen. Pain 10/10 to left hip/leg, PRN morphine given, states he does not take any pain medication at home.      Denies further needs, call light in reach, will continue to monitor.       Latest Reference Range & Units 05/03/25 20:01   pH, Preston 7.350 - 7.450  7.379   pCO2, Preston 40.0 - 50.0 mmHg 51.3 (H)   pO2, Preston 25.0 - 40.0 mmHg 72.7 (H)   Bicarbonate, Venous 23.0 - 29.0 mmol/L 29.6 (H)   TC02 (Calc), Preston Not Established mmol/L 31   Base Excess, Preston -3.0 - 3.0 mmol/L 3.2 (H)   O2 Content, Preston Not Established VOL % 21   MetHgb, Preston <1.5 % 0.2   O2 Saturation Venous Not Established % 94   (H): Data is abnormally high    Patient is able to demonstrate the ability to move from a reclining position to an upright position within the recliner.   
Patient placed on bipap for the hs on settings of 14/7 40%  
Patient resting, eyes closed, BiPAP in place, tolerating well.    
Placed on bipap at this time  
Pulmonary & Critical Care Medicine ICU Progress Note    CC: Hypercapnia    Events of Last 24 hours:   BiPAP 14/7 overnight 40%   3 LPM - uses 2L at home   Little cough with sputum  No hemoptysis   + snoring, apnea/hypoxia and hypersomnia     Vascular lines: IV: PIVs         / / /FiO2 : 40 %  No results for input(s): \"PHART\", \"QIN4TFE\", \"PO2ART\" in the last 72 hours.    IV:   sodium chloride      dextrose         Vitals:  Blood pressure 119/80, pulse 74, temperature 98.1 °F (36.7 °C), temperature source Oral, resp. rate 13, height 1.753 m (5' 9\"), weight (!) 182.1 kg (401 lb 8 oz), SpO2 96%.    Temp  Av.9 °F (36.6 °C)  Min: 97.1 °F (36.2 °C)  Max: 98.4 °F (36.9 °C)    Intake/Output Summary (Last 24 hours) at 2025 0730  Last data filed at 2025 0400  Gross per 24 hour   Intake 792 ml   Output 1800 ml   Net -1008 ml     PE:  Gen: No distress.   Eyes: No sclera icterus. No conjunctival injection.   Neck: Trachea midline. No obvious mass.   Resp: No accessory muscle use. No crackles. No wheezes. No rhonchi. No dullness on percussion. Good air entry.   CV: Regular rate. Regular rhythm. No murmur or rub. + LE  edema.   GI: Non-tender. Non-distended. No hernia.   Skin: Warm and dry. No nodule on exposed extremities.   Lymph: No cervical LAD. No supraclavicular LAD.   M/S: No cyanosis. No joint deformity. No clubbing.   Neuro: Awake. Alert. Moves all four extremities.   Psych: Oriented. No anxiety.       Scheduled Meds:   atorvastatin  40 mg Oral Daily    lisinopril  10 mg Oral Daily    sodium chloride flush  5-40 mL IntraVENous 2 times per day    enoxaparin  60 mg SubCUTAneous BID    insulin glargine  0.15 Units/kg (Ideal) SubCUTAneous Daily    insulin lispro  0.05 Units/kg (Ideal) SubCUTAneous TID WC    insulin lispro  0-4 Units SubCUTAneous 4x Daily AC & HS       Data:  CBC:   Recent Labs     25  1305 25  0424 25  0531   WBC 9.3 12.0* 8.3   HGB 15.1 14.4 14.6   HCT 45.7 44.6 44.4   MCV 89.7 
Pulmonology consult called to answering service, Electronically signed by Pawel Piña on 5/3/2025 at 5:42 PM  
Reassessment complete.    94% spo2 on 3L NC  Lungs diminished with some slight wheeze still  Voided 1000ml of urine after IV lasix.   Able to stand, slow to stand.   After moving back into bed and getting repositioned, states some baseline SOB but nothing worse than how he ever feels at home      
Reassessment complete.    95% spo2 on 3L NC  Lungs diminished with some slight wheeze still to right lung  On phone with friend.  Desaturates some when he sleeps but quickly recovers, does snore. Does ok when he is awake.       
Resting well on bipap, lung sounds diminished, VBG improved, no s/s distress at this time, no c/o pain, call light in reach, will continue to monitor.  No void this shift.   
Shift assessment completed (see flowsheet). Patient is alert and oriented X4. On 3 LPM NC/BIPAP HS. Standby assist x1 using the urinal. No infusions.     IV access:  PIV left AC  PIV left hand.    Bedlocked in lowest position . Call light within reach. Patient denies any discomfort or pain at this time.  
Shift assessment completed, see flow sheet.   RASS 0. Following commands.        Off Bipap, plan to use Qhs, 14/7. On 3l NC  SpO2 95%. Respirations are easy, even, and unlabored.   Bilateral lung sounds diminished with some slight wheezes.      VSS  PIV WNL       All lines and monitoring devices in place.   Bed in lowest position with wheels locked.       
Shift handoff report given to Francheska DAVIS at bedside.   Qhs Bipap, alert and awake  IV handoff completed. 4 eyes completed.   Call light within reach, bed in lowest position. End of shift checks completed.    Pt has been free of falls for duration of shift.     
Started bipap per Dr Griffith's verbal order.       05/03/25 1430   NIV Type   $NIV $Daily Charge   Suction Setup and Functional Yes   NIV Started/Stopped On   Equipment Type v60   Mode Bilevel   Mask Type Full face mask   Mask Size Medium   Bonnet size Medium   Assessment   Pulse 98   Respirations 20   SpO2 97 %   Level of Consciousness 0   Comfort Level Good   Using Accessory Muscles No   Mask Compliance Good   Skin Assessment Clean, dry, & intact   Skin Protection for O2 Device Yes   Breath Sounds   Breath Sounds Bilateral Diminished   Settings/Measurements   PIP Observed 14 cm H20   IPAP 14 cmH20   CPAP/EPAP 7 cmH2O   Vt (Measured) 635 mL   FiO2  40 %   Minute Volume (L/min) 13.4 Liters   Mask Leak (lpm) 17 lpm   Patient's Home Machine No   Alarm Settings   Alarms On Y   Low Pressure (cmH2O) 5 cmH2O   High Pressure (cmH2O) 35 cmH2O   Apnea (secs) 20 secs   RR Low (bpm) 16   RR High (bpm) 45 br/min       
Wound care performed at this time per order.    Cleansed both areas with NS, pat dried. Applied polysporin ointment, gauze, and tape.   
in bed at this time on  2 L O2.  Pt denies shortness of breath; no complaints of chest pain. Stated at home was having back pain, and \"feeling wonky.\"    Scheduled follow-up appointment with Ashia BEDOYA on 5/12/2025 at 1215 pm.     Provided COPD Nurse Resource number at [phone] for any further questions or assistance with resources.    Education:     EDUCATION STATUS: Patient receptive to education  [x]  Provided both written and verbal education on COPD signs & symptoms  [x]  Received verbal acknowledgment/understanding of COPD related causes  [x]  Provided instructions on inhaler's  [x]  Provided instructions on Nebulizer device   [x]  Provided recommendations on breathing techniques / positions  [x]  Provided recommendations on managing stress and anxiety   [x]  Provided information on Pulmonary Rehabilitation   [x]  Provided information on Lung Cancer Screening  []  Provided recommendations for smoking cessation programs      Former smoker-quit 2013    EDUCATIONAL MATERIALS PROVIDED:    [x]  [employer]: Managing your COPD Booklet  [x]  Follow-up Appointment Reminder  [x]  ALA: Getting the Most Out of Medication Delivery Devices  [x]  ALA: My COPD Action Plan  [x]  [employer] [facility]: [facility]   [x]  Smoking Cessation Classes  [x]  [employer]: Signs of COPD    PATIENT/CAREGIVER TEACHING:   Level of patient/caregiver understanding able to:   [x] Verbalize understanding   [] Demonstrate understanding       [] Teach back        [] Needs reinforcement     []  Other:      TEACHING TIME:  20 minutes       Recommendations:     [x]  Encourage to call COPD Nurse Resource Line [phone] with any questions or concerns.  [x]  Encourage follow-up appointment compliance. Next Appointment: 5/12/25 1215pm  [x]  Continue to educate on signs and symptoms of COPD exacerbation.   [x]  Review COPD Action Plan Zones: Green, Yellow, Red.   [x]  Encourage Pulmonary Rehab consult/referral

## 2025-05-05 NOTE — CARE COORDINATION
DISCHARGE ORDER  Date/Time 2025 3:06 PM  Completed by: Cinthya Narvaez RN, Case Management    Patient Name: Francois Louis      : 1970  Admitting Diagnosis: Acute respiratory failure (HCC) [J96.00]  Acute on chronic respiratory failure with hypoxia and hypercapnia (HCC) [J96.21, J96.22]      Admit order Date and Status:5/3/25  (verify MD's last order for status of admission)      Noted discharge order.     Discharge Plan: Pt to dc home. Friend to transport. Pt currently requiring 2L O2 and sats are 93%, pt does not have friend bringing O2 and is declining for CM to provide him with an [employer] etank for his ride home.  He states \"I only live 5 minutes away and I do not always need my oxygen.  have plenty of tanks at home.\"  CM informed RN of pts refusal for O2 to transport home.     [facility]  [address] [place] [address]  Phone:  [phone]  Fax: [phone]     Date of Last IMM Given: na    Reviewed chart.  Role of discharge planner explained and patient verbalized understanding. Discharge order is noted.    Has Home O2 in place on admit:  Yes  Informed of need to bring portable home O2 tank on day of discharge for nursing to connect prior to leaving:   Yes - pt refusing  Verbalized agreement/Understanding:   No  Pt is being d/c'd to home today. Pt's O2 sats are 93% on 2L.    Discharge timeout done with CM/Pt/RN. All discharge needs and concerns addressed.

## 2025-05-05 NOTE — DISCHARGE INSTRUCTIONS
Get sleep study as ordered by Dr Gomes on Wednesday 5/7/25    LIO ROQUE MD 5/5/2025 1:40 PM           Heart Failure Resources:  Heart Failure Interactive Workbook:  Go to https://teextee.Moxtra/publication/?s=450108 for a Free Heart Failure Interactive Workbook provided by The American [employer] Association. This interactive workbook will provide information on Healthier Living with Heart Failure. Please copy and paste link into search bar. Use your mouse to scroll through the pages.    HF [place] yi:   Heart Failure Free smart phone yi available for iPhone and Android download. Use your phone to track sodium intake, fluid intake, symptoms, and weight.     Low Sodium Diet / Recipes:  Go to www.[employer]ardLoop website for “renal” diet which is Low Sodium! [employer] is a dialysis company, but this website offers free seasonal cookbooks. Each quarter, they will release 25-30 new recipes with a breakdown of calories, sodium, and glucose. You can also go to wwwThe Kernel/recipes website for free recipes.     Discharge Instruction Video:  Scan the QR code below with your camera and click the canva.com link to open the video and watch educational information on Heart Failure and Medications from one of our nurses.   https://www.[employer]/design/DAFZnsH_JRk/3NffmotZOIZdxQZbcK1sgh/edit    Home Exercise Program:   Identification of Green/Yellow/Red zones:  You should be able to identify when you feel good (green zone), if you have 1-2 symptoms of HF (yellow zone), or if you are in need of medical attention (red zone).  In your CHF education folder you were provided a “stop light tool” to outline this information.     We want to you to rate your exertion levels:    Our therapy team has discussed means of identification with you such as the \"Magdy scale.\"  The Magdy rating scale ranges from 6 to 20, where 6 means \"no exertion at all\" and 20 means \"maximal exertion.\" The goal is to use this to gauge how much

## 2025-05-05 NOTE — DISCHARGE SUMMARY
Name:  Francois Louis  Room:  3011/3011-01  MRN:    [id number]    Discharge Summary      This discharge summary is in conjunction with a complete physical exam done on the day of discharge.      Discharging Physician: LIO ROQUE MD      Admit: 5/3/2025  Discharge:  5/5/2025     Diagnoses this Admission    Active Problems:    HTN (hypertension)    Chronic obstructive pulmonary disease (HCC)    Acute on chronic respiratory failure with hypoxia and hypercapnia (HCC)    Morbid obesity (HCC)    Chronic right-sided congestive heart failure (HCC)    Umbilical hernia without obstruction and without gangrene    Obesity hypoventilation syndrome (HCC)    Hypoxia  Resolved Problems:    * No resolved hospital problems. *      Procedures (Please Review Full Report for Details)  none    Consults    IP CONSULT TO HOSPITALIST  IP CONSULT TO PULMONOLOGY      HPI:    The patient is a 55-year-old white male with morbid obesity, hypertension, hyperlipidemia, diabetes mellitus type 2, COPD, suspected obesity hypoventilation syndrome who came to the ER a few days ago with bleeding around the umbilical area. He was found to be hypoxic on room air with chest x-ray showing atelectasis and gas showing mild respiratory acidosis with mild CO2 retention. He was treated in the hospital seen by surgery and discharged home. He came back to the hospital saying he was not feeling well. He was found to have CO2 retention again and he was readmitted to the hospital and put on BiPAP. He says he feels much better when he uses BiPAP at night. He is awake and alert. During his last hospital admission was discharged home with oxygen. He is a former smoker who quit 7 years ago. Denies any chest pain or shortness of breath     Physical Exam at Discharge:  BP (!) 135/122   Pulse 93   Temp 97.9 °F (36.6 °C) (Oral)   Resp 19   Ht 1.753 m (5' 9\")   Wt (!) 182.1 kg (401 lb 8 oz)   SpO2 93%   BMI 59.29 kg/m²   Gen: No distress. Alert.  Morbidly

## 2025-05-07 ENCOUNTER — OFFICE VISIT (OUTPATIENT)
Dept: SLEEP MEDICINE | Age: 55
End: 2025-05-07
Payer: COMMERCIAL

## 2025-05-07 ENCOUNTER — TELEPHONE (OUTPATIENT)
Dept: PULMONOLOGY | Age: 55
End: 2025-05-07

## 2025-05-07 VITALS
OXYGEN SATURATION: 97 % | SYSTOLIC BLOOD PRESSURE: 122 MMHG | HEART RATE: 107 BPM | HEIGHT: 69 IN | BODY MASS INDEX: 59.29 KG/M2 | DIASTOLIC BLOOD PRESSURE: 76 MMHG

## 2025-05-07 DIAGNOSIS — E66.01 MORBID OBESITY WITH BMI OF 50.0-59.9, ADULT (HCC): ICD-10-CM

## 2025-05-07 DIAGNOSIS — G47.33 SEVERE OBSTRUCTIVE SLEEP APNEA: Primary | ICD-10-CM

## 2025-05-07 DIAGNOSIS — R06.83 SNORING: Primary | ICD-10-CM

## 2025-05-07 DIAGNOSIS — R51.9 MORNING HEADACHE: ICD-10-CM

## 2025-05-07 DIAGNOSIS — G47.30 OBSERVED SLEEP APNEA: ICD-10-CM

## 2025-05-07 DIAGNOSIS — I10 PRIMARY HYPERTENSION: ICD-10-CM

## 2025-05-07 DIAGNOSIS — G47.34 NOCTURNAL HYPOXEMIA: ICD-10-CM

## 2025-05-07 DIAGNOSIS — R53.83 OTHER FATIGUE: ICD-10-CM

## 2025-05-07 PROCEDURE — 1111F DSCHRG MED/CURRENT MED MERGE: CPT | Performed by: NURSE PRACTITIONER

## 2025-05-07 PROCEDURE — G8427 DOCREV CUR MEDS BY ELIG CLIN: HCPCS | Performed by: NURSE PRACTITIONER

## 2025-05-07 PROCEDURE — 1036F TOBACCO NON-USER: CPT | Performed by: NURSE PRACTITIONER

## 2025-05-07 PROCEDURE — 99204 OFFICE O/P NEW MOD 45 MIN: CPT | Performed by: NURSE PRACTITIONER

## 2025-05-07 PROCEDURE — 3078F DIAST BP <80 MM HG: CPT | Performed by: NURSE PRACTITIONER

## 2025-05-07 PROCEDURE — 3017F COLORECTAL CA SCREEN DOC REV: CPT | Performed by: NURSE PRACTITIONER

## 2025-05-07 PROCEDURE — 3074F SYST BP LT 130 MM HG: CPT | Performed by: NURSE PRACTITIONER

## 2025-05-07 PROCEDURE — G8417 CALC BMI ABV UP PARAM F/U: HCPCS | Performed by: NURSE PRACTITIONER

## 2025-05-07 ASSESSMENT — SLEEP AND FATIGUE QUESTIONNAIRES
HOW LIKELY ARE YOU TO NOD OFF OR FALL ASLEEP WHILE LYING DOWN TO REST IN THE AFTERNOON WHEN CIRCUMSTANCES PERMIT: HIGH CHANCE OF DOZING
HOW LIKELY ARE YOU TO NOD OFF OR FALL ASLEEP WHILE SITTING AND READING: MODERATE CHANCE OF DOZING
HOW LIKELY ARE YOU TO NOD OFF OR FALL ASLEEP WHILE WATCHING TV: WOULD NEVER DOZE
HOW LIKELY ARE YOU TO NOD OFF OR FALL ASLEEP IN A CAR, WHILE STOPPED FOR A FEW MINUTES IN TRAFFIC: WOULD NEVER DOZE
HOW LIKELY ARE YOU TO NOD OFF OR FALL ASLEEP WHILE SITTING INACTIVE IN A PUBLIC PLACE: HIGH CHANCE OF DOZING
HOW LIKELY ARE YOU TO NOD OFF OR FALL ASLEEP WHILE SITTING QUIETLY AFTER LUNCH WITHOUT ALCOHOL: WOULD NEVER DOZE
ESS TOTAL SCORE: 8
HOW LIKELY ARE YOU TO NOD OFF OR FALL ASLEEP WHEN YOU ARE A PASSENGER IN A CAR FOR AN HOUR WITHOUT A BREAK: WOULD NEVER DOZE
HOW LIKELY ARE YOU TO NOD OFF OR FALL ASLEEP WHILE SITTING AND TALKING TO SOMEONE: WOULD NEVER DOZE

## 2025-05-07 NOTE — TELEPHONE ENCOUNTER
Routed split night order to sleep lab with note to schedule ASAP.   Provided pt with sleep lab number.     Follow for scheduling

## 2025-05-07 NOTE — PROGRESS NOTES
Patient ID: Francois Louis is a 55 y.o. male who is being seen today for   Chief Complaint   Patient presents with    New Patient     Referring: Dr. Bush/Hospital    HPI:   Francois Louis is a 55 y.o. male in office for sleep apnea evaluation.   States he was in the hospital.  States he was on BIPAP in the hospital, had Co2 retention at that time. States he was previously sent home from hospital with O2, using 2 L O2 at night and with activity.  Patient reports snoring at night for the past 10 years. Worse in supine position.  Has witnessed apnea.  No restorative sleep. +dry mouth upon awakening. Fatigue and tiredness during the day. No history of sleep apnea. Bedtime MN and rise time is 9-10 am. It takes few minutes to fall asleep. 3-4 nocturia. Wakes up 3-4 times at night. It takes few minutes to fall back a sleep. Takes No nap during the day. +headache in am. No car wrecks or near wrecks because of the sleepiness. No nodding off while driving. No weight gain. Unsure of amount of weight gain.  No forgetfulness or decreased concentration.  Drinks 2 caffinated beverages per day. No significant alcohol. No restless feelings in legs at night. No loss of muscle strength when angry or laugh. No hallucination when dozing off or waking up from sleep. No paralysis upon awakening from sleep or going to sleep. No teeth grinding. No nightmares. No sleep walking. No night time panic attacks. No narcotics. No drug abuse. +history of depression. No history of anxiety. No history of atrial fibrillation. +history of DM. +history of HTN. No history of ischemic heart disease. No history of stroke. ESS is 8 . No smoking. No known FH for RLS or narcolepsy. +FH for JOLANTA- daughter         5/7/2025     2:46 PM   Sleep Medicine   Sitting and reading 2   Watching TV 0   Sitting, inactive in a public place (e.g. a theatre or a meeting) 3   As a passenger in a car for an hour without a break 0   Lying down to rest in the afternoon when

## 2025-05-07 NOTE — PATIENT INSTRUCTIONS
[facility]                   [facility]                     [address], [address], [place] [address]                  [facility]                   [address], [address], [place] [address]                      Phone: [phone] Fax: [phone]                Dear Sleep Center Patient,     For in-lab testing please, bring with you:  Appropriate nightclothes (pajamas, sweats, etc.), slippers and robe  All medications you will need during you stay, including any breathing medications, nebulizers and metered dose inhalers  Your own toiletries and hairdryer if you wish to shower before you leave  Current photo I.D. and Insurance card  We do not allow any pillows or bed linens from home due to health regulations  -We recommend that you not bring valuables with you to the Sleep Center, as we cannot be responsible for any lost or damaged personal items.  Please be aware that [facility] is a non-smoking facility. There is no smoking allowed anywhere on [facility] property at any time.  Each patient room is a private room with a private bathroom.  The in-lab test itself consist of electrodes and sensors attached to specific areas of your scalp, face, chest and legs. We will also monitor respiratory effort, nasal and oral airflow and oxygen levels. The test will not hurt- it is painless and not invasive in any way.      At home testing when you come to  the rental unit, please bring:   -I.D. and Insurance card  **Please note the Home Sleep Test Units are limited. It is a 1 night rental and must be dropped off the following day to ensure you are not billed a late or replacement fee**    Please refrain from or reduce the use of caffeine and/or alcohol prior to your sleep study and avoid napping the day of your study, as these will affect the accuracy of your test results.  If you are ill the day of your test

## 2025-05-08 ENCOUNTER — HOSPITAL ENCOUNTER (OUTPATIENT)
Dept: SLEEP CENTER | Age: 55
Discharge: HOME | End: 2025-05-10
Payer: COMMERCIAL

## 2025-05-08 DIAGNOSIS — E66.01 MORBID OBESITY WITH BMI OF 50.0-59.9, ADULT (HCC): ICD-10-CM

## 2025-05-08 DIAGNOSIS — G47.30 OBSERVED SLEEP APNEA: ICD-10-CM

## 2025-05-08 DIAGNOSIS — I10 PRIMARY HYPERTENSION: ICD-10-CM

## 2025-05-08 DIAGNOSIS — R06.83 SNORING: ICD-10-CM

## 2025-05-08 DIAGNOSIS — R53.83 OTHER FATIGUE: ICD-10-CM

## 2025-05-08 PROCEDURE — 95811 POLYSOM 6/>YRS CPAP 4/> PARM: CPT

## 2025-05-09 ENCOUNTER — RESULTS FOLLOW-UP (OUTPATIENT)
Dept: SLEEP MEDICINE | Age: 55
End: 2025-05-09

## 2025-05-09 PROBLEM — R06.83 SNORING: Status: ACTIVE | Noted: 2025-05-09

## 2025-05-09 NOTE — TELEPHONE ENCOUNTER
Pt is aware. Faxed Bipap order, split night result, and OV note to [facility] via [employer]. Pt aware to call [facility] on Monday.     Follow for set up.

## 2025-05-09 NOTE — TELEPHONE ENCOUNTER
Split-night sleep study showed severe JOLANTA.  Sleep-related breathing was improved with BiPAP however hypoxia was not fully corrected at final pressure.  Recommendation for BiPAP 20/14 cm H2O with 2 L O2 bleed in    Orders placed in epic.  Please send to DME of patient's choice.  He should be set up as soon as possible    Please watch for set up and follow-up appointment should be 4-6 weeks after starting BiPAP

## 2025-05-14 NOTE — TELEPHONE ENCOUNTER
Patient called the sleep center today to schedule the BIPAP titration but there is no order in EPIC for it that I can see. Can you please place the order and send it to me so I can set up the appointment please?

## 2025-05-14 NOTE — TELEPHONE ENCOUNTER
See note below patient does not need BiPAP titration at this time, he needs set up with BiPAP per split-night recommendations.  He should follow-up with DME company to get set up with BiPAP and O2.  Per note below orders have been faxed    Set up should be as soon as possible.  Please watch for set up and patient needs appointment 4-6 weeks after set up

## 2025-05-22 ENCOUNTER — HOSPITAL ENCOUNTER (OUTPATIENT)
Dept: CARDIAC REHAB | Age: 55
Setting detail: THERAPIES SERIES
Discharge: HOME | End: 2025-05-22
Attending: INTERNAL MEDICINE

## 2025-05-22 ASSESSMENT — PULMONARY FUNCTION TESTS
FEV1/FVC: 73
FEV1 (%PREDICTED): 75

## 2025-05-22 ASSESSMENT — EJECTION FRACTION: EF_VALUE: 60

## 2025-05-23 NOTE — TELEPHONE ENCOUNTER
Spoke with pt. He has not heard from [facility]. Spoke with [email or web address]. Fax number I had on filw was 1 number off. Faxed bipap order, O2 order, OV note, and split night to [phone] via [employer].

## 2025-06-03 ENCOUNTER — INITIAL CONSULT (OUTPATIENT)
Dept: SURGERY | Age: 55
End: 2025-06-03
Payer: COMMERCIAL

## 2025-06-03 VITALS — DIASTOLIC BLOOD PRESSURE: 80 MMHG | BODY MASS INDEX: 59.26 KG/M2 | HEIGHT: 69 IN | SYSTOLIC BLOOD PRESSURE: 110 MMHG

## 2025-06-03 DIAGNOSIS — K42.0 INCARCERATED UMBILICAL HERNIA: Primary | ICD-10-CM

## 2025-06-03 DIAGNOSIS — E66.01 MORBID OBESITY WITH BMI OF 50.0-59.9, ADULT (HCC): ICD-10-CM

## 2025-06-03 PROCEDURE — 1111F DSCHRG MED/CURRENT MED MERGE: CPT | Performed by: SURGERY

## 2025-06-03 PROCEDURE — 3017F COLORECTAL CA SCREEN DOC REV: CPT | Performed by: SURGERY

## 2025-06-03 PROCEDURE — 99213 OFFICE O/P EST LOW 20 MIN: CPT | Performed by: SURGERY

## 2025-06-03 PROCEDURE — 3079F DIAST BP 80-89 MM HG: CPT | Performed by: SURGERY

## 2025-06-03 PROCEDURE — G8417 CALC BMI ABV UP PARAM F/U: HCPCS | Performed by: SURGERY

## 2025-06-03 PROCEDURE — 3074F SYST BP LT 130 MM HG: CPT | Performed by: SURGERY

## 2025-06-03 PROCEDURE — G8427 DOCREV CUR MEDS BY ELIG CLIN: HCPCS | Performed by: SURGERY

## 2025-06-03 PROCEDURE — 1036F TOBACCO NON-USER: CPT | Performed by: SURGERY

## 2025-06-03 NOTE — PROGRESS NOTES
[facility] SURGERY    CHIEF COMPLAINT: Hernia    SUBJECTIVE:   Patient presents for follow up of his umbilical hernia.  He reports the bleeding has stopped, and the wound seems to have healed.  He denies significant pain in the hernia site.  The bulge does not go back in.    Allergies   Allergen Reactions    Epinephrine Other (See Comments)     \"Speeds my heart up\".....     Outpatient Medications Marked as Taking for the 6/3/25 encounter (Initial consult) with Ken Pearce MD   Medication Sig Dispense Refill    Oxygen Concentrator Please provide patient with oxygen concentrator for 2 L O2 bleed into BiPAP 20/14 cm H2O    NPI Nava Alonzo CNP [id number] 1 each 0    atorvastatin (LIPITOR) 40 MG tablet Take 1 tablet by mouth daily      lisinopril (PRINIVIL;ZESTRIL) 10 MG tablet Take 1 tablet by mouth daily      metFORMIN (GLUCOPHAGE-XR) 500 MG extended release tablet Take by mouth         Past Medical History:   Diagnosis Date    Anxiety     Bilateral primary osteoarthritis of hip 06/30/2020    Bipolar 1 disorder, manic, mild (HCC) 11/08/2016    Chronic obstructive pulmonary disease (HCC) 06/07/2020    COPD exacerbation (HCC) 06/07/2020    Gastroesophageal reflux disease without esophagitis 06/03/2016    HTN (hypertension) 08/07/2015    Hyperlipidemia     IBS (irritable bowel syndrome) 03/21/2013    Low back pain 06/30/2020    MDD (major depressive disorder) 10/28/2013    Migraine syndrome 03/21/2013    Morbid obesity (HCC)     JOLANTA (obstructive sleep apnea) 5/5/2025    Pneumonia     Tobacco abuse     Type 2 diabetes mellitus (HCC)      Past Surgical History:   Procedure Laterality Date    CHOLECYSTECTOMY       Family History   Problem Relation Age of Onset    Heart Disease Father     Dementia Mother     Asthma Neg Hx     Cancer Neg Hx     Diabetes Neg Hx     Heart Failure Neg Hx     Hypertension Neg Hx     Emphysema Neg Hx      Social History     Socioeconomic History    Marital status:

## 2025-07-15 ENCOUNTER — TELEPHONE (OUTPATIENT)
Dept: PULMONOLOGY | Age: 55
End: 2025-07-15

## 2025-07-15 ENCOUNTER — OFFICE VISIT (OUTPATIENT)
Dept: PULMONOLOGY | Age: 55
End: 2025-07-15
Payer: COMMERCIAL

## 2025-07-15 VITALS
SYSTOLIC BLOOD PRESSURE: 130 MMHG | BODY MASS INDEX: 59.29 KG/M2 | HEART RATE: 91 BPM | HEIGHT: 69 IN | DIASTOLIC BLOOD PRESSURE: 78 MMHG | OXYGEN SATURATION: 92 %

## 2025-07-15 DIAGNOSIS — I10 PRIMARY HYPERTENSION: ICD-10-CM

## 2025-07-15 DIAGNOSIS — Z78.9 DIFFICULTY WITH BIPAP USE: ICD-10-CM

## 2025-07-15 DIAGNOSIS — G47.34 NOCTURNAL HYPOXEMIA: ICD-10-CM

## 2025-07-15 DIAGNOSIS — E66.01 MORBID OBESITY WITH BMI OF 50.0-59.9, ADULT (HCC): ICD-10-CM

## 2025-07-15 DIAGNOSIS — Z71.89 ENCOUNTER FOR BIPAP USE COUNSELING: ICD-10-CM

## 2025-07-15 DIAGNOSIS — G47.33 SEVERE OBSTRUCTIVE SLEEP APNEA: Primary | ICD-10-CM

## 2025-07-15 PROCEDURE — 1036F TOBACCO NON-USER: CPT | Performed by: NURSE PRACTITIONER

## 2025-07-15 PROCEDURE — 3078F DIAST BP <80 MM HG: CPT | Performed by: NURSE PRACTITIONER

## 2025-07-15 PROCEDURE — 3075F SYST BP GE 130 - 139MM HG: CPT | Performed by: NURSE PRACTITIONER

## 2025-07-15 PROCEDURE — 99214 OFFICE O/P EST MOD 30 MIN: CPT | Performed by: NURSE PRACTITIONER

## 2025-07-15 PROCEDURE — G8427 DOCREV CUR MEDS BY ELIG CLIN: HCPCS | Performed by: NURSE PRACTITIONER

## 2025-07-15 PROCEDURE — G8417 CALC BMI ABV UP PARAM F/U: HCPCS | Performed by: NURSE PRACTITIONER

## 2025-07-15 PROCEDURE — 3017F COLORECTAL CA SCREEN DOC REV: CPT | Performed by: NURSE PRACTITIONER

## 2025-07-15 ASSESSMENT — SLEEP AND FATIGUE QUESTIONNAIRES
ESS TOTAL SCORE: 3
HOW LIKELY ARE YOU TO NOD OFF OR FALL ASLEEP IN A CAR, WHILE STOPPED FOR A FEW MINUTES IN TRAFFIC: WOULD NEVER DOZE
HOW LIKELY ARE YOU TO NOD OFF OR FALL ASLEEP WHILE SITTING AND TALKING TO SOMEONE: WOULD NEVER DOZE
HOW LIKELY ARE YOU TO NOD OFF OR FALL ASLEEP WHILE WATCHING TV: SLIGHT CHANCE OF DOZING
HOW LIKELY ARE YOU TO NOD OFF OR FALL ASLEEP WHILE SITTING QUIETLY AFTER LUNCH WITHOUT ALCOHOL: WOULD NEVER DOZE
HOW LIKELY ARE YOU TO NOD OFF OR FALL ASLEEP WHILE LYING DOWN TO REST IN THE AFTERNOON WHEN CIRCUMSTANCES PERMIT: WOULD NEVER DOZE
HOW LIKELY ARE YOU TO NOD OFF OR FALL ASLEEP WHEN YOU ARE A PASSENGER IN A CAR FOR AN HOUR WITHOUT A BREAK: MODERATE CHANCE OF DOZING
HOW LIKELY ARE YOU TO NOD OFF OR FALL ASLEEP WHILE SITTING AND READING: WOULD NEVER DOZE
HOW LIKELY ARE YOU TO NOD OFF OR FALL ASLEEP WHILE SITTING INACTIVE IN A PUBLIC PLACE: WOULD NEVER DOZE

## 2025-07-15 NOTE — PROGRESS NOTES
Patient ID: Francois Louis is a 55 y.o. male who is being seen today for   Chief Complaint   Patient presents with    Follow-up    Sleep Apnea     Referring: Dr. Bush/Hospital    HPI:     Francois Louis is a 55 y.o. male in office with brother for JOLANTA follow up.  Split-night sleep study was reviewed by me and noted below it showed severe JOLANTA with significant nocturnal hypoxemia.  Patient started BiPAP and O2 after testing.  Results were dicussed with patient and multiple good questions were answered.  States he stopped using BIPAP due to he was getting a dry mouth and pressure felt too high, felt it was hurting his lungs.  States he has been using just oxygen at night if not using BiPAP.  States less tired if using BIPAP.    PLMS on PSG.  Patient denies RLS symptoms    Patient is using BiPAP 4-6 hrs/night. Using humidifier. No snoring on BiPAP. The pressure feels too high. The mask is comfortable- full face. No mask leak. No significant daytime sleepiness. No nodding off when driving. No dry nose or throat. No fatigue. Bedtime is 11 pm- MN and rise time is 9-10 am. Sleep onset is few minutes. Wakes up 2-3 times at night total. 1-2 nocturia. It takes usually few minutes to fall back a sleep. Occasional naps during the day. Sometimes headache in am. No weight gain. 2 caffienated beverages during the day. No alcohol. ESS is 3        Initial HPI 5/7/25  Francois Louis is a 55 y.o. male in office for sleep apnea evaluation.   States he was in the hospital.  States he was on BIPAP in the hospital, had Co2 retention at that time. States he was previously sent home from hospital with O2, using 2 L O2 at night and with activity.  Patient reports snoring at night for the past 10 years. Worse in supine position.  Has witnessed apnea.  No restorative sleep. +dry mouth upon awakening. Fatigue and tiredness during the day. No history of sleep apnea. Bedtime MN and rise time is 9-10 am. It takes few minutes to fall asleep. 3-4

## 2025-07-25 ENCOUNTER — HOSPITAL ENCOUNTER (EMERGENCY)
Age: 55
Discharge: HOME | End: 2025-07-26
Payer: COMMERCIAL

## 2025-07-25 DIAGNOSIS — G89.29 ACUTE ON CHRONIC LOW BACK PAIN: ICD-10-CM

## 2025-07-25 DIAGNOSIS — M54.50 ACUTE ON CHRONIC LOW BACK PAIN: ICD-10-CM

## 2025-07-25 DIAGNOSIS — S39.012A LUMBAR STRAIN, INITIAL ENCOUNTER: Primary | ICD-10-CM

## 2025-07-25 PROCEDURE — 99284 EMERGENCY DEPT VISIT MOD MDM: CPT

## 2025-07-25 ASSESSMENT — PAIN SCALES - GENERAL: PAINLEVEL_OUTOF10: 9

## 2025-07-25 ASSESSMENT — PAIN DESCRIPTION - DESCRIPTORS: DESCRIPTORS: DISCOMFORT

## 2025-07-25 ASSESSMENT — PAIN - FUNCTIONAL ASSESSMENT: PAIN_FUNCTIONAL_ASSESSMENT: 0-10

## 2025-07-25 ASSESSMENT — PAIN DESCRIPTION - LOCATION: LOCATION: BACK

## 2025-07-26 VITALS
WEIGHT: 315 LBS | OXYGEN SATURATION: 94 % | SYSTOLIC BLOOD PRESSURE: 134 MMHG | DIASTOLIC BLOOD PRESSURE: 80 MMHG | HEIGHT: 69 IN | BODY MASS INDEX: 46.65 KG/M2 | TEMPERATURE: 98.6 F | HEART RATE: 89 BPM | RESPIRATION RATE: 15 BRPM

## 2025-07-26 PROCEDURE — 6370000000 HC RX 637 (ALT 250 FOR IP): Performed by: PHYSICIAN ASSISTANT

## 2025-07-26 PROCEDURE — 6360000002 HC RX W HCPCS: Performed by: PHYSICIAN ASSISTANT

## 2025-07-26 PROCEDURE — 96372 THER/PROPH/DIAG INJ SC/IM: CPT

## 2025-07-26 RX ORDER — METHOCARBAMOL 750 MG/1
750 TABLET, FILM COATED ORAL 3 TIMES DAILY
Qty: 15 TABLET | Refills: 0 | Status: SHIPPED | OUTPATIENT
Start: 2025-07-26 | End: 2025-07-31

## 2025-07-26 RX ORDER — NAPROXEN 500 MG/1
500 TABLET ORAL 2 TIMES DAILY WITH MEALS
Qty: 20 TABLET | Refills: 0 | Status: SHIPPED | OUTPATIENT
Start: 2025-07-26

## 2025-07-26 RX ORDER — LIDOCAINE 4 G/G
1 PATCH TOPICAL ONCE
Status: DISCONTINUED | OUTPATIENT
Start: 2025-07-26 | End: 2025-07-26 | Stop reason: HOSPADM

## 2025-07-26 RX ORDER — KETOROLAC TROMETHAMINE 15 MG/ML
30 INJECTION, SOLUTION INTRAMUSCULAR; INTRAVENOUS ONCE
Status: COMPLETED | OUTPATIENT
Start: 2025-07-26 | End: 2025-07-26

## 2025-07-26 RX ORDER — LIDOCAINE 4 G/G
1 PATCH TOPICAL DAILY
Qty: 30 PATCH | Refills: 0 | Status: SHIPPED | OUTPATIENT
Start: 2025-07-26 | End: 2025-08-25

## 2025-07-26 RX ADMIN — KETOROLAC TROMETHAMINE 30 MG: 15 INJECTION, SOLUTION INTRAMUSCULAR; INTRAVENOUS at 00:24

## 2025-07-26 NOTE — ED PROVIDER NOTES
[facility] EMERGENCY DEPARTMENT  EMERGENCY DEPARTMENT ENCOUNTER        Pt Name: Francois Louis  MRN: [id number]  Birthdate 1970  Date of evaluation: 7/25/2025  Provider: Kassandra Paredes PA-C  PCP: Ashia Davis PA-C  Note Started: 12:12 AM EDT 7/26/25      DORIS. I have evaluated this patient.        CHIEF COMPLAINT       Chief Complaint   Patient presents with    Back Pain     Pt arrives from home with back pain. Pt states he tweaked his back when he had tripped but didn't fall. Denies any other injuries        HISTORY OF PRESENT ILLNESS: 1 or more Elements     History From: Patient  Limitations to history : None    Francois Louis is a 55 y.o. male who presents to the emergency department for evaluation of left low back pain.  States he tweaked his back today when he was walking and his shoe got caught caused him to jerk his back.  He did not fall no direct trauma to his back.  States he  has had back problems since 1991 and old injury and it flares up off and on.  No other symptoms.  No abdominal pain.  No fever.  No chest pain or shortness of breath.  No vomiting.  No urinary symptoms.  No bowel or bladder dysfunction.  No saddle anesthesia.  No numbness or weakness of extremities.  Has been ambulatory.  Pain is worse with movement, twisting and bending.  Drove self here.    Nursing Notes were all reviewed and agreed with or any disagreements were addressed in the HPI.    REVIEW OF SYSTEMS :      Review of Systems    Positives and Pertinent negatives as per HPI.     SURGICAL HISTORY     Past Surgical History:   Procedure Laterality Date    CHOLECYSTECTOMY         CURRENTMEDICATIONS       Discharge Medication List as of 7/26/2025 12:15 AM        CONTINUE these medications which have NOT CHANGED    Details   Oxygen Concentrator Starting Fri 5/9/2025, Disp-1 each, R-0, PrintPlease provide patient with oxygen concentrator for 2 L O2 bleed into BiPAP 20/14 cm H2O  NPI Nava [facility] CNP [phone]

## 2025-07-26 NOTE — DISCHARGE INSTRUCTIONS
Recommend rest.  You may alternate ice and heat.  You have been prescribed muscle relaxer, anti-inflammatory medication and lidocaine patches.  Please follow-up with your doctor Monday.  Return to the ER if your symptoms worsen.